# Patient Record
Sex: FEMALE | Race: WHITE | Employment: OTHER | URBAN - METROPOLITAN AREA
[De-identification: names, ages, dates, MRNs, and addresses within clinical notes are randomized per-mention and may not be internally consistent; named-entity substitution may affect disease eponyms.]

---

## 2017-01-01 ENCOUNTER — APPOINTMENT (OUTPATIENT)
Dept: GENERAL RADIOLOGY | Age: 72
End: 2017-01-01
Attending: EMERGENCY MEDICINE
Payer: MEDICARE

## 2017-01-01 ENCOUNTER — HOSPITAL ENCOUNTER (EMERGENCY)
Age: 72
Discharge: HOME | End: 2017-08-02
Attending: EMERGENCY MEDICINE
Payer: MEDICARE

## 2017-01-01 ENCOUNTER — HOSPITAL ENCOUNTER (EMERGENCY)
Age: 72
Discharge: HOME | End: 2017-05-10
Attending: EMERGENCY MEDICINE | Admitting: EMERGENCY MEDICINE
Payer: MEDICARE

## 2017-01-01 VITALS
HEIGHT: 60 IN | WEIGHT: 104.5 LBS | HEART RATE: 82 BPM | TEMPERATURE: 99 F | BODY MASS INDEX: 20.52 KG/M2 | OXYGEN SATURATION: 99 % | SYSTOLIC BLOOD PRESSURE: 158 MMHG | RESPIRATION RATE: 18 BRPM | DIASTOLIC BLOOD PRESSURE: 89 MMHG

## 2017-01-01 VITALS
RESPIRATION RATE: 22 BRPM | HEART RATE: 88 BPM | HEIGHT: 60 IN | TEMPERATURE: 97.7 F | BODY MASS INDEX: 20.62 KG/M2 | SYSTOLIC BLOOD PRESSURE: 143 MMHG | WEIGHT: 105 LBS | OXYGEN SATURATION: 100 % | DIASTOLIC BLOOD PRESSURE: 86 MMHG

## 2017-01-01 DIAGNOSIS — S20.212A RIB CONTUSION, LEFT, INITIAL ENCOUNTER: ICD-10-CM

## 2017-01-01 DIAGNOSIS — R53.1 GENERALIZED WEAKNESS: Primary | ICD-10-CM

## 2017-01-01 DIAGNOSIS — B37.0 THRUSH: ICD-10-CM

## 2017-01-01 DIAGNOSIS — J34.0 NASAL ABSCESS: Primary | ICD-10-CM

## 2017-01-01 LAB
ALBUMIN SERPL BCP-MCNC: 3.5 G/DL (ref 3.5–5)
ALBUMIN/GLOB SERPL: 1 {RATIO} (ref 1.1–2.2)
ALP SERPL-CCNC: 186 U/L (ref 45–117)
ALT SERPL-CCNC: 23 U/L (ref 12–78)
AMPHET UR QL SCN: NEGATIVE
ANION GAP BLD CALC-SCNC: 6 MMOL/L (ref 5–15)
APAP SERPL-MCNC: 3 UG/ML (ref 10–30)
APPEARANCE UR: CLEAR
APPEARANCE UR: CLEAR
AST SERPL W P-5'-P-CCNC: 20 U/L (ref 15–37)
ATRIAL RATE: 87 BPM
BACTERIA URNS QL MICRO: NEGATIVE /HPF
BACTERIA URNS QL MICRO: NEGATIVE /HPF
BARBITURATES UR QL SCN: NEGATIVE
BASOPHILS # BLD AUTO: 0 K/UL (ref 0–0.1)
BASOPHILS # BLD: 1 % (ref 0–1)
BENZODIAZ UR QL: NEGATIVE
BILIRUB SERPL-MCNC: 0.2 MG/DL (ref 0.2–1)
BILIRUB UR QL: NEGATIVE
BILIRUB UR QL: NEGATIVE
BUN SERPL-MCNC: 25 MG/DL (ref 6–20)
BUN/CREAT SERPL: 21 (ref 12–20)
CALCIUM SERPL-MCNC: 8.1 MG/DL (ref 8.5–10.1)
CALCULATED P AXIS, ECG09: 78 DEGREES
CALCULATED R AXIS, ECG10: 16 DEGREES
CALCULATED T AXIS, ECG11: 56 DEGREES
CANNABINOIDS UR QL SCN: NEGATIVE
CHLORIDE SERPL-SCNC: 111 MMOL/L (ref 97–108)
CK MB CFR SERPL CALC: 1.1 % (ref 0–2.5)
CK MB SERPL-MCNC: 1.1 NG/ML (ref 5–25)
CK SERPL-CCNC: 99 U/L (ref 26–192)
CO2 SERPL-SCNC: 23 MMOL/L (ref 21–32)
COCAINE UR QL SCN: NEGATIVE
COLOR UR: ABNORMAL
COLOR UR: NORMAL
CREAT SERPL-MCNC: 1.18 MG/DL (ref 0.55–1.02)
DIAGNOSIS, 93000: NORMAL
DRUG SCRN COMMENT,DRGCM: NORMAL
EOSINOPHIL # BLD: 0.3 K/UL (ref 0–0.4)
EOSINOPHIL NFR BLD: 4 % (ref 0–7)
EPITH CASTS URNS QL MICRO: ABNORMAL /LPF
EPITH CASTS URNS QL MICRO: NORMAL /LPF
ERYTHROCYTE [DISTWIDTH] IN BLOOD BY AUTOMATED COUNT: 16.1 % (ref 11.5–14.5)
ETHANOL SERPL-MCNC: <10 MG/DL
GLOBULIN SER CALC-MCNC: 3.4 G/DL (ref 2–4)
GLUCOSE SERPL-MCNC: 83 MG/DL (ref 65–100)
GLUCOSE UR STRIP.AUTO-MCNC: NEGATIVE MG/DL
GLUCOSE UR STRIP.AUTO-MCNC: NEGATIVE MG/DL
HCT VFR BLD AUTO: 30.2 % (ref 35–47)
HGB BLD-MCNC: 9 G/DL (ref 11.5–16)
HGB UR QL STRIP: ABNORMAL
HGB UR QL STRIP: NEGATIVE
HYALINE CASTS URNS QL MICRO: ABNORMAL /LPF (ref 0–5)
HYALINE CASTS URNS QL MICRO: NORMAL /LPF (ref 0–5)
KETONES UR QL STRIP.AUTO: NEGATIVE MG/DL
KETONES UR QL STRIP.AUTO: NEGATIVE MG/DL
LEUKOCYTE ESTERASE UR QL STRIP.AUTO: NEGATIVE
LEUKOCYTE ESTERASE UR QL STRIP.AUTO: NEGATIVE
LYMPHOCYTES # BLD AUTO: 24 % (ref 12–49)
LYMPHOCYTES # BLD: 2.1 K/UL (ref 0.8–3.5)
MCH RBC QN AUTO: 23.7 PG (ref 26–34)
MCHC RBC AUTO-ENTMCNC: 29.8 G/DL (ref 30–36.5)
MCV RBC AUTO: 79.7 FL (ref 80–99)
METHADONE UR QL: NEGATIVE
MONOCYTES # BLD: 0.4 K/UL (ref 0–1)
MONOCYTES NFR BLD AUTO: 4 % (ref 5–13)
NEUTS SEG # BLD: 5.9 K/UL (ref 1.8–8)
NEUTS SEG NFR BLD AUTO: 67 % (ref 32–75)
NITRITE UR QL STRIP.AUTO: NEGATIVE
NITRITE UR QL STRIP.AUTO: NEGATIVE
OPIATES UR QL: NEGATIVE
P-R INTERVAL, ECG05: 144 MS
PCP UR QL: NEGATIVE
PH UR STRIP: 5.5 [PH] (ref 5–8)
PH UR STRIP: 5.5 [PH] (ref 5–8)
PLATELET # BLD AUTO: 349 K/UL (ref 150–400)
POTASSIUM SERPL-SCNC: 4.2 MMOL/L (ref 3.5–5.1)
PROT SERPL-MCNC: 6.9 G/DL (ref 6.4–8.2)
PROT UR STRIP-MCNC: ABNORMAL MG/DL
PROT UR STRIP-MCNC: NEGATIVE MG/DL
Q-T INTERVAL, ECG07: 400 MS
QRS DURATION, ECG06: 70 MS
QTC CALCULATION (BEZET), ECG08: 481 MS
RBC # BLD AUTO: 3.79 M/UL (ref 3.8–5.2)
RBC #/AREA URNS HPF: ABNORMAL /HPF (ref 0–5)
RBC #/AREA URNS HPF: NORMAL /HPF (ref 0–5)
SALICYLATES SERPL-MCNC: 4.6 MG/DL (ref 2.8–20)
SODIUM SERPL-SCNC: 140 MMOL/L (ref 136–145)
SP GR UR REFRACTOMETRY: 1.01 (ref 1–1.03)
SP GR UR REFRACTOMETRY: 1.01 (ref 1–1.03)
TROPONIN I SERPL-MCNC: <0.04 NG/ML
UA: UC IF INDICATED,UAUC: ABNORMAL
UA: UC IF INDICATED,UAUC: NORMAL
UROBILINOGEN UR QL STRIP.AUTO: 0.2 EU/DL (ref 0.2–1)
UROBILINOGEN UR QL STRIP.AUTO: 0.2 EU/DL (ref 0.2–1)
VENTRICULAR RATE, ECG03: 87 BPM
WBC # BLD AUTO: 8.8 K/UL (ref 3.6–11)
WBC URNS QL MICRO: ABNORMAL /HPF (ref 0–4)
WBC URNS QL MICRO: NORMAL /HPF (ref 0–4)

## 2017-01-01 PROCEDURE — 99285 EMERGENCY DEPT VISIT HI MDM: CPT

## 2017-01-01 PROCEDURE — 74011250637 HC RX REV CODE- 250/637: Performed by: EMERGENCY MEDICINE

## 2017-01-01 PROCEDURE — 80053 COMPREHEN METABOLIC PANEL: CPT | Performed by: EMERGENCY MEDICINE

## 2017-01-01 PROCEDURE — 36415 COLL VENOUS BLD VENIPUNCTURE: CPT | Performed by: EMERGENCY MEDICINE

## 2017-01-01 PROCEDURE — 81001 URINALYSIS AUTO W/SCOPE: CPT | Performed by: EMERGENCY MEDICINE

## 2017-01-01 PROCEDURE — 74011000250 HC RX REV CODE- 250: Performed by: EMERGENCY MEDICINE

## 2017-01-01 PROCEDURE — 80307 DRUG TEST PRSMV CHEM ANLYZR: CPT | Performed by: EMERGENCY MEDICINE

## 2017-01-01 PROCEDURE — 84484 ASSAY OF TROPONIN QUANT: CPT | Performed by: EMERGENCY MEDICINE

## 2017-01-01 PROCEDURE — 99284 EMERGENCY DEPT VISIT MOD MDM: CPT

## 2017-01-01 PROCEDURE — 75810000289 HC I&D ABSCESS SIMP/COMP/MULT

## 2017-01-01 PROCEDURE — 85025 COMPLETE CBC W/AUTO DIFF WBC: CPT | Performed by: EMERGENCY MEDICINE

## 2017-01-01 PROCEDURE — 82550 ASSAY OF CK (CPK): CPT | Performed by: EMERGENCY MEDICINE

## 2017-01-01 PROCEDURE — 71010 XR CHEST PORT: CPT

## 2017-01-01 PROCEDURE — 71020 XR CHEST PA LAT: CPT

## 2017-01-01 PROCEDURE — 93005 ELECTROCARDIOGRAM TRACING: CPT

## 2017-01-01 RX ORDER — CEPHALEXIN 250 MG/1
500 CAPSULE ORAL
Status: COMPLETED | OUTPATIENT
Start: 2017-01-01 | End: 2017-01-01

## 2017-01-01 RX ORDER — MUPIROCIN 20 MG/G
OINTMENT TOPICAL 3 TIMES DAILY
Qty: 22 G | Refills: 0 | Status: SHIPPED | OUTPATIENT
Start: 2017-01-01 | End: 2017-01-01

## 2017-01-01 RX ORDER — CEPHALEXIN 500 MG/1
500 CAPSULE ORAL 3 TIMES DAILY
Qty: 21 CAP | Refills: 0 | Status: SHIPPED | OUTPATIENT
Start: 2017-01-01 | End: 2017-01-01

## 2017-01-01 RX ORDER — HYDROCODONE BITARTRATE AND ACETAMINOPHEN 5; 325 MG/1; MG/1
1 TABLET ORAL
Qty: 20 TAB | Refills: 0 | Status: SHIPPED | OUTPATIENT
Start: 2017-01-01 | End: 2018-01-01

## 2017-01-01 RX ORDER — SULFAMETHOXAZOLE AND TRIMETHOPRIM 800; 160 MG/1; MG/1
1 TABLET ORAL 2 TIMES DAILY
Qty: 20 TAB | Refills: 0 | Status: SHIPPED | OUTPATIENT
Start: 2017-01-01 | End: 2017-01-01

## 2017-01-01 RX ORDER — NYSTATIN 100000 [USP'U]/ML
1 SUSPENSION ORAL 4 TIMES DAILY
Qty: 473 ML | Refills: 0 | Status: SHIPPED | OUTPATIENT
Start: 2017-01-01 | End: 2018-01-01

## 2017-01-01 RX ORDER — LIDOCAINE HYDROCHLORIDE 20 MG/ML
15 SOLUTION OROPHARYNGEAL
Status: COMPLETED | OUTPATIENT
Start: 2017-01-01 | End: 2017-01-01

## 2017-01-01 RX ORDER — SULFAMETHOXAZOLE AND TRIMETHOPRIM 800; 160 MG/1; MG/1
1 TABLET ORAL
Status: COMPLETED | OUTPATIENT
Start: 2017-01-01 | End: 2017-01-01

## 2017-01-01 RX ORDER — HYDROCODONE BITARTRATE AND ACETAMINOPHEN 5; 325 MG/1; MG/1
1 TABLET ORAL
Status: COMPLETED | OUTPATIENT
Start: 2017-01-01 | End: 2017-01-01

## 2017-01-01 RX ADMIN — HYDROCODONE BITARTRATE AND ACETAMINOPHEN 1 TABLET: 5; 325 TABLET ORAL at 16:35

## 2017-01-01 RX ADMIN — SULFAMETHOXAZOLE AND TRIMETHOPRIM 1 TABLET: 800; 160 TABLET ORAL at 15:11

## 2017-01-01 RX ADMIN — CEPHALEXIN 500 MG: 250 CAPSULE ORAL at 15:11

## 2017-01-01 RX ADMIN — LIDOCAINE HYDROCHLORIDE 15 ML: 20 SOLUTION ORAL; TOPICAL at 15:11

## 2017-02-16 NOTE — TELEPHONE ENCOUNTER
Call PCP for refills, not seen in 1.5 years    Received refill request from [address] for NTG which we do show in med list.    Spoke with pharmacist.  Christy Downs to call PCP for all refills as we have not seen in over a year, was due back Jan 2016. Pharmacist verbalized understanding.

## 2017-05-10 NOTE — ED NOTES
The physician has reviewed discharge instructions with the patient. The patient verbalized understanding. Patient ambulated out of the Emergency Department.

## 2017-05-10 NOTE — DISCHARGE INSTRUCTIONS
Candidiasis: Care Instructions  Your Care Instructions  Candidiasis (say \"xlm-sdo-HT-uh-tatyana\") is a yeast infection. Yeast normally lives in your body. But it can cause problems if your body's defenses don't work as they should. Some medicines can increase your chance of getting a yeast infection. These include antibiotics, steroids, and cancer drugs. And some diseases like AIDS and diabetes can make you more likely to get yeast infections. There are different types of yeast infections. [place] is a yeast infection in the mouth. It usually occurs in people with weak immune systems. It causes white patches inside the mouth and throat. Yeast infections of the skin usually occur in skin folds where the skin stays moist. They cause red, oozing patches on your skin. Babies can get these infections under the diaper. People who often wear gloves can get them on their hands. Many women get vaginal yeast infections. They are most common when women take antibiotics. These infections can cause the vagina to itch and burn. They also cause white discharge that looks like cottage cheese. In rare cases, yeast infects the blood. This can cause serious disease. This kind of infection is treated with medicine given through a needle into a vein (IV). After you start treatment, a yeast infection usually goes away quickly. But if your immune system is weak, the infection may come back. Tell your doctor if you get yeast infections often. Follow-up care is a key part of your treatment and safety. Be sure to make and go to all appointments, and call your doctor if you are having problems. It's also a good idea to know your test results and keep a list of the medicines you take. How can you care for yourself at home? · Take your medicines exactly as prescribed. Call your doctor if you think you are having a problem with your medicine. · Use antibiotics only as directed by your doctor. · Eat yogurt with live cultures.  It has bacteria called lactobacillus. It may help prevent some types of yeast infections. · Keep your skin clean and dry. Put powder on moist places. · If you are using a cream or suppository to treat a vaginal yeast infection, don't use condoms or a diaphragm. Use a different type of birth control. · Eat a healthy diet and get regular exercise. This will help keep your immune system strong. When should you call for help? Call your doctor now or seek immediate medical care if:  · You have a fever. · You are pregnant and have signs of a vaginal or urinary tract infection such as:  ¨ Severe itching in your vagina. ¨ Pain during sex or when you urinate. ¨ Unusual discharge from your vagina. ¨ A frequent urge to urinate. ¨ Urine that is cloudy or smells bad. Watch closely for changes in your health, and be sure to contact your doctor if:  · You do not get better as expected. Where can you learn more? Go to http://valerie-gene.info/. Enter B298 in the search box to learn more about \"Candidiasis: Care Instructions. \"  Current as of: October 13, 2016  Content Version: 11.2  © 9524-5214 [employer]. Care instructions adapted under license by [employer] (which disclaims liability or warranty for this information). If you have questions about a medical condition or this instruction, always ask your healthcare professional. [address] any warranty or liability for your use of this information.

## 2017-05-10 NOTE — ED PROVIDER NOTES
HPI Comments: Amrit Meyer is a 66 y.o. female with PMHx significant for COPD, CAD, HTN, Hyperlipemia. Hyperkalemia, DM, and an MI, who presents ambulatory to [facility] ED with cc of lesions in her right upper mouth and a nose abscess x 4 days. Pt states she also has a productive cough with clear sputum, and claims she hasn't been able to sleep for 2 days. She notes that she had pain to \"the left side of her ribs\" but does not experience it now. Pt notes she had taken a fall off of her sofa while trying to put on curtains 8 days ago. She also notes that she had seen an ENT specialist, because she cannot hear out of her right ear. She also notes that she has an albuterol inhalar for her COPD, and took Prednisone which she claims helped with her coughing. She also states to have inserted a needle into her mouth to \"drain her abscess\" but was unsuccessful. Pt denies denies any fever, nausea, or vomiting. Josselyn Peña MD  Cardiologist: Holly Whatley MD    Social Hx: + smoking; - EtOH; - illicit drug use    There are no other complains, changes, or physical findings at this time. The history is provided by the patient. No  was used. Past Medical History:   Diagnosis Date    [facility] [address]     CAD (coronary artery disease)     COPD     Diabetes ([facility] [address]     Emphysema     Essential hypertension     Gastrointestinal disorder     ulcer    Hyperkalemia     Hyperlipemia     Hypertension        Past Surgical History:   Procedure Laterality Date    CORONARY STENT SINGLE W/PTCA      HX CORONARY STENT PLACEMENT      HX HEART CATHETERIZATION           No family history on file. Social History     Social History    Marital status: SINGLE     Spouse name: N/A    Number of children: N/A    Years of education: N/A     Occupational History    Not on file.      Social History Main Topics    Smoking status: Current Every Day Smoker     Packs/day: 0.50     Years: 0.00 Types: Cigarettes    Smokeless tobacco: Never Used    Alcohol use No    Drug use: No    Sexual activity: Not on file     Other Topics Concern    Not on file     Social History Narrative         ALLERGIES: Iodinated contrast media - oral and iv dye; Penicillin g; Ciprofloxacin; and Lyrica [pregabalin]    Review of Systems   Constitutional: Negative for fever. Respiratory: Positive for cough (productive). Gastrointestinal: Negative for nausea and vomiting. Genitourinary: Positive for flank pain (left). Patient Vitals for the past 12 hrs:   Temp Pulse Resp BP SpO2   05/10/17 1639 99 °F (37.2 °C) 82 18 - 99 %   05/10/17 1600 - - - 158/89 -   05/10/17 1544 - - - - 100 %   05/10/17 1513 - - - 149/81 -   05/10/17 1235 98 °F (36.7 °C) (!) 106 16 113/66 100 %       Physical Exam   Constitutional: She is oriented to person, place, and time. She appears well-developed and well-nourished. No distress. HENT:   Head: Normocephalic and atraumatic. Mouth/Throat: Oropharynx is clear and moist. No oropharyngeal exudate. Swelling to the right upper gum line, there appears to be material along tongue and mucosa c/w thrush, there is a lesion in the right nare at opening on medial border with small pustule, no significant erythema extending to the cheek or upper lip, edentulous   Eyes: Conjunctivae and EOM are normal. Pupils are equal, round, and reactive to light. Neck: Normal range of motion. Neck supple. No JVD present. No tracheal deviation present. Cardiovascular: Normal rate, regular rhythm, normal heart sounds and intact distal pulses. No murmur heard. Pulmonary/Chest: Effort normal and breath sounds normal. No stridor. No respiratory distress. She has no wheezes. She has no rales. She exhibits no tenderness. Coarse rhonchi   Abdominal: Soft. She exhibits no distension. There is no tenderness. There is no rebound and no guarding. Musculoskeletal: Normal range of motion.  She exhibits no edema or tenderness. Neurological: She is alert and oriented to person, place, and time. No cranial nerve deficit. No gross motor or sensory deficits    Skin: Skin is warm and dry. She is not diaphoretic. Psychiatric: She has a normal mood and affect. Her behavior is normal.   Nursing note and vitals reviewed. MDM  Number of Diagnoses or Management Options  Diagnosis management comments: DDx: nasal abscess, rib fracture, bronchitis, PNA, aphthous ulcer, thrush       Amount and/or Complexity of Data Reviewed  Tests in the radiology section of CPT®: ordered and reviewed  Review and summarize past medical records: yes    Patient Progress  Patient progress: stable      Procedures  Procedure Note - Incision and Drainage:   4:10 PM  Performed by: Dylon Beckman MD  Skin not prepped with anesthetic. Sterile field established. Abscess to mouth was incised with 18 guage needle, and 4 CCs of pus drainage was expressed. Wound probed and irrigated. Sterile dressing applied. Estimated blood loss: little  The procedure took 1-15 minutes, and pt tolerated well, expressed gums feel better.   Written by Orlin Salinas ED Scribe, as dictated by Dylon Beckman MD.     LABORATORY TESTS:  Recent Results (from the past 12 hour(s))   URINALYSIS W/ REFLEX CULTURE    Collection Time: 05/10/17  1:14 PM   Result Value Ref Range    Color YELLOW/STRAW      Appearance CLEAR CLEAR      Specific gravity 1.010 1.003 - 1.030      pH (UA) 5.5 5.0 - 8.0      Protein TRACE (A) NEG mg/dL    Glucose NEGATIVE  NEG mg/dL    Ketone NEGATIVE  NEG mg/dL    Bilirubin NEGATIVE  NEG      Blood TRACE (A) NEG      Urobilinogen 0.2 0.2 - 1.0 EU/dL    Nitrites NEGATIVE  NEG      Leukocyte Esterase NEGATIVE  NEG      WBC 0-4 0 - 4 /hpf    RBC 0-5 0 - 5 /hpf    Epithelial cells FEW FEW /lpf    Bacteria NEGATIVE  NEG /hpf    UA:UC IF INDICATED CULTURE NOT INDICATED BY UA RESULT CNI      Hyaline cast 0-2 0 - 5 /lpf       IMAGING RESULTS:    CXR Results  (Last 48 hours)               05/10/17 1400  XR CHEST PA LAT Final result    Impression:  IMPRESSION:   1. Emphysema   2. No rib fractures       Narrative:  Exam:  2 view chest       Indication: Fall x2 weeks injury left chest.       COMPARISON: 4/12/2014       PA and lateral views demonstrate normal heart size. Coronary stents are noted. The lungs are hyperinflated and there are findings consistent with emphysema. There is no acute process. Visualized osseous structures reveal no rib   fractures. There is dextroconvex curvature of the lower thoracic and upper   lumbar spine. There is mild wedging of the T12 vertebral body which is unchanged. MEDICATIONS GIVEN:  Medications   lidocaine (XYLOCAINE) 2 % viscous solution 15 mL (15 mL Mouth/Throat Given 5/10/17 1511)   trimethoprim-sulfamethoxazole (BACTRIM DS, SEPTRA DS) 160-800 mg per tablet 1 Tab (1 Tab Oral Given 5/10/17 1511)   cephALEXin (KEFLEX) capsule 500 mg (500 mg Oral Given 5/10/17 1511)   HYDROcodone-acetaminophen (NORCO) 5-325 mg per tablet 1 Tab (1 Tab Oral Given 5/10/17 1635)       IMPRESSION:  1. Nasal abscess    2. Thrush    3. Rib contusion, left, initial encounter        PLAN:  1. Discharge Medication List as of 5/10/2017  4:29 PM      START taking these medications    Details   trimethoprim-sulfamethoxazole (BACTRIM DS) 160-800 mg per tablet Take 1 Tab by mouth two (2) times a day for 10 days. , Normal, Disp-20 Tab, R-0      cephALEXin (KEFLEX) 500 mg capsule Take 1 Cap by mouth three (3) times daily for 7 days. , Normal, Disp-21 Cap, R-0      mupirocin (BACTROBAN) 2 % ointment Apply  to affected area three (3) times daily for 10 days. Apply to affected area, Normal, Disp-22 g, R-0      HYDROcodone-acetaminophen (NORCO) 5-325 mg per tablet Take 1 Tab by mouth every four (4) hours as needed for Pain.  Max Daily Amount: 6 Tabs., Print, Disp-20 Tab, R-0      nystatin (MYCOSTATIN) 100,000 unit/mL suspension Take 5 mL by mouth four (4) times daily. swish and spit  Indications: ORAL CANDIDIASIS, Normal, Disp-473 mL, R-0         CONTINUE these medications which have NOT CHANGED    Details   albuterol (PROVENTIL HFA, VENTOLIN HFA, PROAIR HFA) 90 mcg/actuation inhaler Take  by inhalation. , Historical Med      pravastatin (PRAVACHOL) 80 mg tablet TAKE 1 TABLET BY MOUTH ONCE DAILY, Normal, Disp-90 Tab, R-0      ASPIRIN/ACETAMINOPHEN/CAFFEINE (EXCEDRIN EXTRA STRENGTH PO) Take 1 Tab by mouth as needed., Historical Med      metoprolol succinate (TOPROL-XL) 25 mg XL tablet Take 0.5 Tabs by mouth two (2) times a day., Normal, Disp-60 Tab, R-5      clonazepam (KLONOPIN) 0.5 mg tablet Take 0.5 mg by mouth as needed., Historical Med           2. Follow-up Information     Follow up With Details 42[address] Tim VARGAS MD   [address] [place] [address]  [address] [address] [phone]      [facility] EMERGENCY DEPT  If symptoms worsen [address] [place] [address] Drive  [address] [place] [address]  [phone]        Return to ED if worse     DISCHARGE NOTE  4:29 PM  The patient has been re-evaluated and is ready for discharge. Reviewed available results with patient. Counseled patient on diagnosis and care plan. Patient has expressed understanding, and all questions have been answered. Patient agrees with plan and agrees to follow up as recommended, or return to the ED if their symptoms worsen. Discharge instructions have been provided and explained to the patient, along with reasons to return to the ED. ATTESTATION:  This note is prepared by Suri Carney, acting as Scribe for Deonte Sosa MD.    Deonte Sosa MD: The scribe's documentation has been prepared under my direction and personally reviewed by me in its entirety. I confirm that the note above accurately reflects all work, treatment, procedures, and medical decision making performed by me.

## 2017-08-02 NOTE — DISCHARGE INSTRUCTIONS
Fatigue: Care Instructions  Your Care Instructions  Fatigue is a feeling of tiredness, exhaustion, or lack of energy. You may feel fatigue because of too much or not enough activity. It can also come from stress, lack of sleep, boredom, and poor diet. Many medical problems, such as viral infections, can cause fatigue. Emotional problems, especially depression, are often the cause of fatigue. Fatigue is most often a symptom of another problem. Treatment for fatigue depends on the cause. For example, if you have fatigue because you have a certain health problem, treating this problem also treats your fatigue. If depression or anxiety is the cause, treatment may help. Follow-up care is a key part of your treatment and safety. Be sure to make and go to all appointments, and call your doctor if you are having problems. It's also a good idea to know your test results and keep a list of the medicines you take. How can you care for yourself at home? · Get regular exercise. But don't overdo it. Go back and forth between rest and exercise. · Get plenty of rest.  · Eat a healthy diet. Do not skip meals, especially breakfast.  · Reduce your use of caffeine, tobacco, and alcohol. Caffeine is most often found in coffee, tea, cola drinks, and chocolate. · Limit medicines that can cause fatigue. This includes tranquilizers and cold and allergy medicines. When should you call for help? Watch closely for changes in your health, and be sure to contact your doctor if:  · You have new symptoms such as fever or a rash. · Your fatigue gets worse. · You have been feeling down, depressed, or hopeless. Or you may have lost interest in things that you usually enjoy. · You are not getting better as expected. Where can you learn more? Go to http://valerie-gene.info/. Enter B077 in the search box to learn more about \"Fatigue: Care Instructions. \"  Current as of: March 20, 2017  Content Version: 11.3  © 6446-8413 [employer]. Care instructions adapted under license by [employer] (which disclaims liability or warranty for this information). If you have questions about a medical condition or this instruction, always ask your healthcare professional. [place] [address] any warranty or liability for your use of this information.

## 2017-08-02 NOTE — PROGRESS NOTES
Care management consult requested re: transportation home. The patient is a 66year old female who presented to ED due to [address]. CM met with patient in waiting room after discharge; she is alert and oriented and is ambulatory. She has been unable to reach any friends or family members to help with transportation. Per chart review she does have Medicaid; contacted Rich Pathak, who referred me to [facility] ([phone]). [facility] does not have the patient on file. Provided patient with a cab voucher to [facility]. Care Management Interventions  PCP Verified by CM: Yes  Mode of Transport at Discharge: Other (see comment) (cab voucher)  Transition of Care Consult (CM Consult): Discharge Planning  Current Support Network:  Other  Confirm Follow Up Transport: Self  Plan discussed with Pt/Family/Caregiver: Yes  Discharge Location  Discharge Placement: Home with outpatient services    KAMALJIT Hernandez  [phone]

## 2017-08-02 NOTE — ED PROVIDER NOTES
HPI Comments: Daniele Early, 66 y.o. female, with PMHx of CAD, MI, HTN, HLD, DM, COPD, emphysema presents via EMS to [facility] ED for evaluation of altered mental status. Per nursing staff, patient's roommate called EMS stating that the patient had fallen repeatedly today and was more sleepy and groggy compared to usual. EMS states that the roommate informed them that the patient takes clonazepam, however EMS could not find the pill bottle, but do note that they found an empty bottle of Nyquil. Patient states that she currently feels fatigued, generalized weakness, and vomited twice yesterday. Pt states that the fatigue and weakness have been present for 5 days. Pt is aware that she is at [facility] currently. She is aware of the current year. She denies blood in stool, black stool, CP, SOB, diarrhea, dysuria, numbness, abdominal pain. PCP: Fe Cuba MD    Social history significant for: + Tobacco, - EtOH, - Illicit Drug Use    There are no other complaints, changes, or physical findings at this time. The history is provided by the patient. No  was used. Past Medical History:   Diagnosis Date    Acute MI ([address]     CAD (coronary artery disease)     COPD     Diabetes ([facility]     Emphysema     Essential hypertension     Gastrointestinal disorder     ulcer    Hyperkalemia     Hyperlipemia     Hypertension        Past Surgical History:   Procedure Laterality Date    CORONARY STENT SINGLE W/PTCA      HX CORONARY STENT PLACEMENT      HX HEART CATHETERIZATION           History reviewed. No pertinent family history. Social History     Social History    Marital status: SINGLE     Spouse name: N/A    Number of children: N/A    Years of education: N/A     Occupational History    Not on file.      Social History Main Topics    Smoking status: Current Every Day Smoker     Packs/day: 0.50     Years: 0.00     Types: Cigarettes    Smokeless tobacco: Never Used    Alcohol use No    Drug use: No    Sexual activity: Not on file     Other Topics Concern    Not on file     Social History Narrative         ALLERGIES: Iodinated contrast- oral and iv dye; Penicillin g; Ciprofloxacin; and Lyrica [pregabalin]    Review of Systems   Constitutional: Positive for fatigue. Negative for fever. HENT: Negative. Eyes: Negative. Respiratory: Negative for shortness of breath and wheezing. Cardiovascular: Negative for chest pain and leg swelling. Gastrointestinal: Positive for vomiting. Negative for abdominal pain, blood in stool, constipation, diarrhea and nausea. Endocrine: Negative. Genitourinary: Negative for difficulty urinating and dysuria. Musculoskeletal: Negative. Skin: Negative for rash. Allergic/Immunologic: Negative. Neurological: Positive for weakness. Negative for numbness. Hematological: Negative. Psychiatric/Behavioral: Negative. Patient Vitals for the past 12 hrs:   Temp Pulse Resp BP SpO2   08/02/17 1400 - 87 18 124/70 100 %   08/02/17 1330 - 86 17 123/68 100 %   08/02/17 1318 - 85 18 131/75 100 %   08/02/17 1239 97.7 °F (36.5 °C) 94 16 143/84 100 %   08/02/17 1230 - 90 19 126/69 100 %   08/02/17 1205 - - - 143/84 -     Physical Exam   Constitutional: She is oriented to person, place, and time. She appears well-developed and well-nourished. HENT:   Head: Normocephalic and atraumatic. Mouth/Throat: Mucous membranes are normal.   Eyes: EOM are normal. Pupils are equal, round, and reactive to light. Neck: Normal range of motion. No JVD present. No tracheal deviation present. Cardiovascular: Normal rate, regular rhythm, normal heart sounds and intact distal pulses. Exam reveals no gallop and no friction rub. No murmur heard. Pulmonary/Chest: Effort normal and breath sounds normal. No stridor. She has no wheezes. She has no rales. Abdominal: Soft. Bowel sounds are normal. She exhibits no distension and no mass.  There is no tenderness. There is no guarding. Musculoskeletal: Normal range of motion. She exhibits no edema or tenderness. Neurological: She is alert and oriented to person, place, and time. Follows commands  Answers questions  Moves all extremities spontaneously   Skin: Skin is warm and dry. No rash noted. Several ecchymotic lesions over L forearm, no swelling or deformity. Pt with full ROM of her L wrist and elbow. Psychiatric: She has a normal mood and affect. Her behavior is normal. Judgment and thought content normal.        MDM  Number of Diagnoses or Management Options  Diagnosis management comments: Pt presenting for possible altered mental status and increased lethargy. Pt is alert and oriented on exam, is answering questions appropriately and following commands. Will check labs to eval for infection, dehydration, electrolyte abnormality, pneumonia, uti, drug overdose, acs. Amount and/or Complexity of Data Reviewed  Clinical lab tests: ordered and reviewed  Tests in the radiology section of CPT®: ordered and reviewed  Tests in the medicine section of CPT®: ordered and reviewed  Review and summarize past medical records: yes  Independent visualization of images, tracings, or specimens: yes    Patient Progress  Patient progress: stable    ED Course       Procedures    EKG interpretation: (Preliminary) 1:27 PM  Rhythm: normal sinus rhythm; and regular . Rate (approx.): 87; Axis: normal; RI interval: normal; QRS interval: normal ; ST/T wave: normal.    Progress Note:  3:26 PM  Patient ambulated. She is tolerating PO and denies any complaints.      LABORATORY TESTS:  Recent Results (from the past 12 hour(s))   EKG, 12 LEAD, INITIAL    Collection Time: 08/02/17  1:27 PM   Result Value Ref Range    Ventricular Rate 87 BPM    Atrial Rate 87 BPM    P-R Interval 144 ms    QRS Duration 70 ms    Q-T Interval 400 ms    QTC Calculation (Bezet) 481 ms    Calculated P Axis 78 degrees    Calculated R Axis 16 degrees Calculated T Axis 56 degrees    Diagnosis       Normal sinus rhythm  Low voltage QRS  Septal infarct , age undetermined  Abnormal ECG  When compared with ECG of 17-JUN-2014 12:21,  Septal infarct is now present     METABOLIC PANEL, COMPREHENSIVE    Collection Time: 08/02/17  1:31 PM   Result Value Ref Range    Sodium 140 136 - 145 mmol/L    Potassium 4.2 3.5 - 5.1 mmol/L    Chloride 111 (H) 97 - 108 mmol/L    CO2 23 21 - 32 mmol/L    Anion gap 6 5 - 15 mmol/L    Glucose 83 65 - 100 mg/dL    BUN 25 (H) 6 - 20 MG/DL    Creatinine 1.18 (H) 0.55 - 1.02 MG/DL    BUN/Creatinine ratio 21 (H) 12 - 20      GFR est AA 54 (L) >60 ml/min/1.73m2    GFR est non-AA 45 (L) >60 ml/min/1.73m2    Calcium 8.1 (L) 8.5 - 10.1 MG/DL    Bilirubin, total 0.2 0.2 - 1.0 MG/DL    ALT (SGPT) 23 12 - 78 U/L    AST (SGOT) 20 15 - 37 U/L    Alk. phosphatase 186 (H) 45 - 117 U/L    Protein, total 6.9 6.4 - 8.2 g/dL    Albumin 3.5 3.5 - 5.0 g/dL    Globulin 3.4 2.0 - 4.0 g/dL    A-G Ratio 1.0 (L) 1.1 - 2.2     CK W/ CKMB & INDEX    Collection Time: 08/02/17  1:31 PM   Result Value Ref Range    CK 99 26 - 192 U/L    CK - MB 1.1 <3.6 NG/ML    CK-MB Index 1.1 0 - 2.5     CBC WITH AUTOMATED DIFF    Collection Time: 08/02/17  1:31 PM   Result Value Ref Range    WBC 8.8 3.6 - 11.0 K/uL    RBC 3.79 (L) 3.80 - 5.20 M/uL    HGB 9.0 (L) 11.5 - 16.0 g/dL    HCT 30.2 (L) 35.0 - 47.0 %    MCV 79.7 (L) 80.0 - 99.0 FL    MCH 23.7 (L) 26.0 - 34.0 PG    MCHC 29.8 (L) 30.0 - 36.5 g/dL    RDW 16.1 (H) 11.5 - 14.5 %    PLATELET 587 211 - 041 K/uL    NEUTROPHILS 67 32 - 75 %    LYMPHOCYTES 24 12 - 49 %    MONOCYTES 4 (L) 5 - 13 %    EOSINOPHILS 4 0 - 7 %    BASOPHILS 1 0 - 1 %    ABS. NEUTROPHILS 5.9 1.8 - 8.0 K/UL    ABS. LYMPHOCYTES 2.1 0.8 - 3.5 K/UL    ABS. MONOCYTES 0.4 0.0 - 1.0 K/UL    ABS. EOSINOPHILS 0.3 0.0 - 0.4 K/UL    ABS.  BASOPHILS 0.0 0.0 - 0.1 K/UL   TROPONIN I    Collection Time: 08/02/17  1:31 PM   Result Value Ref Range    Troponin-I, Qt. <0.04 <0.05 ng/mL   DRUG SCREEN, URINE    Collection Time: 08/02/17  1:31 PM   Result Value Ref Range    AMPHETAMINES NEGATIVE  NEG      BARBITURATES NEGATIVE  NEG      BENZODIAZEPINE NEGATIVE  NEG      COCAINE NEGATIVE  NEG      METHADONE NEGATIVE  NEG      OPIATES NEGATIVE  NEG      PCP(PHENCYCLIDINE) NEGATIVE  NEG      THC (TH-CANNABINOL) NEGATIVE  NEG      Drug screen comment (NOTE)    ACETAMINOPHEN    Collection Time: 08/02/17  1:31 PM   Result Value Ref Range    Acetaminophen level 3 (L) 10 - 30 ug/mL   SALICYLATE    Collection Time: 08/02/17  1:31 PM   Result Value Ref Range    SALICYLATE 4.6 2.8 - 29.5 MG/DL   ETHYL ALCOHOL    Collection Time: 08/02/17  1:31 PM   Result Value Ref Range    ALCOHOL(ETHYL),SERUM <10 <10 MG/DL   URINALYSIS W/ REFLEX CULTURE    Collection Time: 08/02/17  1:31 PM   Result Value Ref Range    Color YELLOW/STRAW      Appearance CLEAR CLEAR      Specific gravity 1.010 1.003 - 1.030      pH (UA) 5.5 5.0 - 8.0      Protein NEGATIVE  NEG mg/dL    Glucose NEGATIVE  NEG mg/dL    Ketone NEGATIVE  NEG mg/dL    Bilirubin NEGATIVE  NEG      Blood NEGATIVE  NEG      Urobilinogen 0.2 0.2 - 1.0 EU/dL    Nitrites NEGATIVE  NEG      Leukocyte Esterase NEGATIVE  NEG      WBC 0-4 0 - 4 /hpf    RBC 0-5 0 - 5 /hpf    Epithelial cells FEW FEW /lpf    Bacteria NEGATIVE  NEG /hpf    UA:UC IF INDICATED CULTURE NOT INDICATED BY UA RESULT CNI      Hyaline cast 0-2 0 - 5 /lpf       IMAGING RESULTS:  XR CHEST PORT   Final Result   Clinical history: Fall, altered mental status  INDICATION: Fall, altered mental status     COMPARISON: 5/10/2017     FINDINGS:   AP semiupright view of the chest is obtained . The cardiopericardial silhouette  is stable. There is no pleural effusion, pneumothorax or focal consolidation  present. The patient is rotated. The patient is on a cardiac monitor.     IMPRESSION:     No acute thoracic disease.          IMPRESSION:  1. Generalized weakness        PLAN:  1.  Discharge     Follow-up Information     Follow up With Details Comments Contact Info    Jemma Galvan MD Schedule an appointment as soon as possible for a visit  [address] [place] [address]  [address] [address] [phone]      [facility] EMERGENCY DEPT  As needed, If symptoms worsen [address]  [address] [facility]  [phone]        Return to ED if worse     Discharge Note:  3:33 PM  The pt is ready for discharge. The pt's signs, symptoms, diagnosis, and discharge instructions have been discussed and pt has conveyed their understanding. The pt is to follow up as recommended or return to ER should their symptoms worsen. Plan has been discussed and pt is in agreement. This note is prepared by Daysi Jain, acting as a Scribe for Lino Herrera DO. Lino Herrera DO: The scribe's documentation has been prepared under my direction and personally reviewed by me in its entirety. I confirm that the notes above accurately reflects all work, treatment, procedures, and medical decision making performed by me.

## 2017-08-02 NOTE — ED NOTES
Patient arrived VIA EMS after being called by patients roommate for altered mental status. patients roommate states patients mental status is different today, patient is sleepy, groggy, and has had 2 falls. patient fell twice this morning according to roommate, patient denies any complaints. On assessment of patient, she is sleeping, will wake with verbal stimuli, answer questions appropriately, then fall right back to sleep. Roommate states patient takes clonazepam, and they cannot find the pill bottle, also an empty bottle of Nyquil was found by EMS this morning. Patient states she tripped over her feet and fell, patient states she fell on her left side striking her left arm and head on the hardwood floor. Patient denies LOC, patient has small abrasions and bruising to the lower left arm .

## 2018-01-01 ENCOUNTER — APPOINTMENT (OUTPATIENT)
Dept: GENERAL RADIOLOGY | Age: 73
DRG: 190 | End: 2018-01-01
Attending: NURSE PRACTITIONER
Payer: MEDICARE

## 2018-01-01 ENCOUNTER — HOME CARE VISIT (OUTPATIENT)
Dept: HOSPICE | Facility: HOSPICE | Age: 73
End: 2018-01-01
Payer: MEDICARE

## 2018-01-01 ENCOUNTER — APPOINTMENT (OUTPATIENT)
Dept: NUCLEAR MEDICINE | Age: 73
DRG: 190 | End: 2018-01-01
Attending: INTERNAL MEDICINE
Payer: MEDICARE

## 2018-01-01 ENCOUNTER — APPOINTMENT (OUTPATIENT)
Dept: GENERAL RADIOLOGY | Age: 73
DRG: 190 | End: 2018-01-01
Attending: INTERNAL MEDICINE
Payer: MEDICARE

## 2018-01-01 ENCOUNTER — HOSPITAL ENCOUNTER (INPATIENT)
Age: 73
LOS: 14 days | Discharge: HOSPICE | DRG: 190 | End: 2018-03-01
Attending: EMERGENCY MEDICINE | Admitting: INTERNAL MEDICINE
Payer: MEDICARE

## 2018-01-01 ENCOUNTER — APPOINTMENT (OUTPATIENT)
Dept: GENERAL RADIOLOGY | Age: 73
DRG: 190 | End: 2018-01-01
Attending: EMERGENCY MEDICINE
Payer: MEDICARE

## 2018-01-01 ENCOUNTER — APPOINTMENT (OUTPATIENT)
Dept: CT IMAGING | Age: 73
DRG: 190 | End: 2018-01-01
Attending: NURSE PRACTITIONER
Payer: MEDICARE

## 2018-01-01 ENCOUNTER — HOSPICE ADMISSION (OUTPATIENT)
Dept: HOSPICE | Facility: HOSPICE | Age: 73
End: 2018-01-01
Payer: MEDICARE

## 2018-01-01 ENCOUNTER — HOSPITAL ENCOUNTER (INPATIENT)
Age: 73
LOS: 1 days | DRG: 951 | End: 2018-03-02
Attending: INTERNAL MEDICINE | Admitting: INTERNAL MEDICINE
Payer: OTHER MISCELLANEOUS

## 2018-01-01 ENCOUNTER — APPOINTMENT (OUTPATIENT)
Dept: CT IMAGING | Age: 73
DRG: 190 | End: 2018-01-01
Attending: INTERNAL MEDICINE
Payer: MEDICARE

## 2018-01-01 VITALS
SYSTOLIC BLOOD PRESSURE: 110 MMHG | OXYGEN SATURATION: 88 % | HEART RATE: 98 BPM | WEIGHT: 97.8 LBS | BODY MASS INDEX: 18 KG/M2 | RESPIRATION RATE: 18 BRPM | DIASTOLIC BLOOD PRESSURE: 62 MMHG | HEIGHT: 62 IN | TEMPERATURE: 98.2 F

## 2018-01-01 VITALS
RESPIRATION RATE: 14 BRPM | OXYGEN SATURATION: 76 % | DIASTOLIC BLOOD PRESSURE: 40 MMHG | SYSTOLIC BLOOD PRESSURE: 68 MMHG | TEMPERATURE: 98.1 F | HEART RATE: 102 BPM

## 2018-01-01 DIAGNOSIS — G93.40 ENCEPHALOPATHY: ICD-10-CM

## 2018-01-01 DIAGNOSIS — R41.0 INTERMITTENT CONFUSION: ICD-10-CM

## 2018-01-01 DIAGNOSIS — F13.10 BENZODIAZEPINE ABUSE (HCC): ICD-10-CM

## 2018-01-01 DIAGNOSIS — J44.1 COPD EXACERBATION (HCC): Primary | ICD-10-CM

## 2018-01-01 DIAGNOSIS — F32.A ANXIETY AND DEPRESSION: ICD-10-CM

## 2018-01-01 DIAGNOSIS — Z71.89 GOALS OF CARE, COUNSELING/DISCUSSION: ICD-10-CM

## 2018-01-01 DIAGNOSIS — J43.9 PULMONARY EMPHYSEMA, UNSPECIFIED EMPHYSEMA TYPE (HCC): ICD-10-CM

## 2018-01-01 DIAGNOSIS — R06.02 SOB (SHORTNESS OF BREATH): ICD-10-CM

## 2018-01-01 DIAGNOSIS — F41.9 ANXIETY AND DEPRESSION: ICD-10-CM

## 2018-01-01 DIAGNOSIS — J44.1 ACUTE EXACERBATION OF CHRONIC OBSTRUCTIVE PULMONARY DISEASE (COPD) (HCC): ICD-10-CM

## 2018-01-01 DIAGNOSIS — R53.81 DEBILITY: ICD-10-CM

## 2018-01-01 DIAGNOSIS — G93.40 ENCEPHALOPATHY ACUTE: ICD-10-CM

## 2018-01-01 LAB
ABO + RH BLD: NORMAL
ALBUMIN SERPL-MCNC: 1.2 G/DL (ref 3.5–5)
ALBUMIN SERPL-MCNC: 1.5 G/DL (ref 3.5–5)
ALBUMIN SERPL-MCNC: 2 G/DL (ref 3.5–5)
ALBUMIN SERPL-MCNC: 2.1 G/DL (ref 3.5–5)
ALBUMIN SERPL-MCNC: 2.5 G/DL (ref 3.5–5)
ALBUMIN SERPL-MCNC: 3.3 G/DL (ref 3.5–5)
ALBUMIN/GLOB SERPL: 0.3 {RATIO} (ref 1.1–2.2)
ALBUMIN/GLOB SERPL: 0.3 {RATIO} (ref 1.1–2.2)
ALBUMIN/GLOB SERPL: 0.4 {RATIO} (ref 1.1–2.2)
ALBUMIN/GLOB SERPL: 0.5 {RATIO} (ref 1.1–2.2)
ALBUMIN/GLOB SERPL: 0.5 {RATIO} (ref 1.1–2.2)
ALBUMIN/GLOB SERPL: 0.7 {RATIO} (ref 1.1–2.2)
ALP SERPL-CCNC: 126 U/L (ref 45–117)
ALP SERPL-CCNC: 135 U/L (ref 45–117)
ALP SERPL-CCNC: 135 U/L (ref 45–117)
ALP SERPL-CCNC: 151 U/L (ref 45–117)
ALP SERPL-CCNC: 196 U/L (ref 45–117)
ALP SERPL-CCNC: 91 U/L (ref 45–117)
ALT SERPL-CCNC: 12 U/L (ref 12–78)
ALT SERPL-CCNC: 17 U/L (ref 12–78)
ALT SERPL-CCNC: 23 U/L (ref 12–78)
ALT SERPL-CCNC: 26 U/L (ref 12–78)
ALT SERPL-CCNC: 31 U/L (ref 12–78)
ALT SERPL-CCNC: 49 U/L (ref 12–78)
AMMONIA PLAS-SCNC: <10 UMOL/L
ANION GAP SERPL CALC-SCNC: 10 MMOL/L (ref 5–15)
ANION GAP SERPL CALC-SCNC: 11 MMOL/L (ref 5–15)
ANION GAP SERPL CALC-SCNC: 12 MMOL/L (ref 5–15)
ANION GAP SERPL CALC-SCNC: 12 MMOL/L (ref 5–15)
ANION GAP SERPL CALC-SCNC: 15 MMOL/L (ref 5–15)
ANION GAP SERPL CALC-SCNC: 6 MMOL/L (ref 5–15)
ANION GAP SERPL CALC-SCNC: 7 MMOL/L (ref 5–15)
ANION GAP SERPL CALC-SCNC: 8 MMOL/L (ref 5–15)
ANION GAP SERPL CALC-SCNC: 8 MMOL/L (ref 5–15)
APPEARANCE FLD: ABNORMAL
APPEARANCE UR: CLEAR
APTT PPP: 41 SEC (ref 22.1–32)
ARTERIAL PATENCY WRIST A: ABNORMAL
AST SERPL-CCNC: 20 U/L (ref 15–37)
AST SERPL-CCNC: 21 U/L (ref 15–37)
AST SERPL-CCNC: 22 U/L (ref 15–37)
AST SERPL-CCNC: 48 U/L (ref 15–37)
ATRIAL RATE: 100 BPM
ATRIAL RATE: 104 BPM
ATRIAL RATE: 114 BPM
ATRIAL RATE: 125 BPM
ATRIAL RATE: 182 BPM
ATTENDING PHYSICIAN, CST07: NORMAL
BACTERIA SPEC CULT: ABNORMAL
BACTERIA SPEC CULT: NORMAL
BACTERIA URNS QL MICRO: NEGATIVE /HPF
BASE DEFICIT BLDA-SCNC: 6.2 MMOL/L
BASOPHILS # BLD: 0 K/UL (ref 0–0.1)
BASOPHILS NFR BLD: 0 % (ref 0–1)
BDY SITE: ABNORMAL
BILIRUB SERPL-MCNC: 0.3 MG/DL (ref 0.2–1)
BILIRUB SERPL-MCNC: 0.4 MG/DL (ref 0.2–1)
BILIRUB SERPL-MCNC: 0.4 MG/DL (ref 0.2–1)
BILIRUB SERPL-MCNC: 0.5 MG/DL (ref 0.2–1)
BILIRUB UR QL: NEGATIVE
BLD PROD TYP BPU: NORMAL
BLD PROD TYP BPU: NORMAL
BLOOD GROUP ANTIBODIES SERPL: NORMAL
BNP SERPL-MCNC: 3659 PG/ML (ref 0–125)
BPU ID: NORMAL
BPU ID: NORMAL
BUN SERPL-MCNC: 20 MG/DL (ref 6–20)
BUN SERPL-MCNC: 21 MG/DL (ref 6–20)
BUN SERPL-MCNC: 24 MG/DL (ref 6–20)
BUN SERPL-MCNC: 25 MG/DL (ref 6–20)
BUN SERPL-MCNC: 27 MG/DL (ref 6–20)
BUN SERPL-MCNC: 29 MG/DL (ref 6–20)
BUN SERPL-MCNC: 34 MG/DL (ref 6–20)
BUN SERPL-MCNC: 36 MG/DL (ref 6–20)
BUN SERPL-MCNC: 38 MG/DL (ref 6–20)
BUN SERPL-MCNC: 41 MG/DL (ref 6–20)
BUN SERPL-MCNC: 50 MG/DL (ref 6–20)
BUN/CREAT SERPL: 22 (ref 12–20)
BUN/CREAT SERPL: 25 (ref 12–20)
BUN/CREAT SERPL: 26 (ref 12–20)
BUN/CREAT SERPL: 27 (ref 12–20)
BUN/CREAT SERPL: 29 (ref 12–20)
BUN/CREAT SERPL: 29 (ref 12–20)
BUN/CREAT SERPL: 31 (ref 12–20)
BUN/CREAT SERPL: 35 (ref 12–20)
BUN/CREAT SERPL: 35 (ref 12–20)
BUN/CREAT SERPL: 42 (ref 12–20)
BUN/CREAT SERPL: 44 (ref 12–20)
C JEJUNI+C COLI AG STL QL: NEGATIVE
CALCIUM SERPL-MCNC: 7.3 MG/DL (ref 8.5–10.1)
CALCIUM SERPL-MCNC: 7.4 MG/DL (ref 8.5–10.1)
CALCIUM SERPL-MCNC: 7.8 MG/DL (ref 8.5–10.1)
CALCIUM SERPL-MCNC: 8 MG/DL (ref 8.5–10.1)
CALCIUM SERPL-MCNC: 8.2 MG/DL (ref 8.5–10.1)
CALCIUM SERPL-MCNC: 8.2 MG/DL (ref 8.5–10.1)
CALCIUM SERPL-MCNC: 8.3 MG/DL (ref 8.5–10.1)
CALCIUM SERPL-MCNC: 8.6 MG/DL (ref 8.5–10.1)
CALCIUM SERPL-MCNC: 8.7 MG/DL (ref 8.5–10.1)
CALCIUM SERPL-MCNC: 9 MG/DL (ref 8.5–10.1)
CALCIUM SERPL-MCNC: 9.1 MG/DL (ref 8.5–10.1)
CALCULATED P AXIS, ECG09: 52 DEGREES
CALCULATED P AXIS, ECG09: 94 DEGREES
CALCULATED P AXIS, ECG09: 98 DEGREES
CALCULATED R AXIS, ECG10: -27 DEGREES
CALCULATED R AXIS, ECG10: -4 DEGREES
CALCULATED R AXIS, ECG10: -56 DEGREES
CALCULATED R AXIS, ECG10: -57 DEGREES
CALCULATED R AXIS, ECG10: -61 DEGREES
CALCULATED T AXIS, ECG11: -105 DEGREES
CALCULATED T AXIS, ECG11: 49 DEGREES
CALCULATED T AXIS, ECG11: 67 DEGREES
CALCULATED T AXIS, ECG11: 77 DEGREES
CALCULATED T AXIS, ECG11: 85 DEGREES
CHLORIDE SERPL-SCNC: 103 MMOL/L (ref 97–108)
CHLORIDE SERPL-SCNC: 105 MMOL/L (ref 97–108)
CHLORIDE SERPL-SCNC: 106 MMOL/L (ref 97–108)
CHLORIDE SERPL-SCNC: 107 MMOL/L (ref 97–108)
CHLORIDE SERPL-SCNC: 109 MMOL/L (ref 97–108)
CHLORIDE SERPL-SCNC: 110 MMOL/L (ref 97–108)
CHLORIDE SERPL-SCNC: 110 MMOL/L (ref 97–108)
CHLORIDE SERPL-SCNC: 112 MMOL/L (ref 97–108)
CHLORIDE SERPL-SCNC: 114 MMOL/L (ref 97–108)
CHLORIDE SERPL-SCNC: 117 MMOL/L (ref 97–108)
CHLORIDE SERPL-SCNC: 119 MMOL/L (ref 97–108)
CK MB CFR SERPL CALC: 4.5 % (ref 0–2.5)
CK MB CFR SERPL CALC: 4.6 % (ref 0–2.5)
CK MB CFR SERPL CALC: 5.4 % (ref 0–2.5)
CK MB SERPL-MCNC: 19.2 NG/ML (ref 5–25)
CK MB SERPL-MCNC: 2.3 NG/ML (ref 5–25)
CK MB SERPL-MCNC: 2.9 NG/ML (ref 5–25)
CK SERPL-CCNC: 356 U/L (ref 26–192)
CK SERPL-CCNC: 36 U/L (ref 26–192)
CK SERPL-CCNC: 51 U/L (ref 26–192)
CK SERPL-CCNC: 63 U/L (ref 26–192)
CO2 SERPL-SCNC: 10 MMOL/L (ref 21–32)
CO2 SERPL-SCNC: 16 MMOL/L (ref 21–32)
CO2 SERPL-SCNC: 16 MMOL/L (ref 21–32)
CO2 SERPL-SCNC: 18 MMOL/L (ref 21–32)
CO2 SERPL-SCNC: 21 MMOL/L (ref 21–32)
CO2 SERPL-SCNC: 21 MMOL/L (ref 21–32)
CO2 SERPL-SCNC: 23 MMOL/L (ref 21–32)
CO2 SERPL-SCNC: 23 MMOL/L (ref 21–32)
CO2 SERPL-SCNC: 24 MMOL/L (ref 21–32)
CO2 SERPL-SCNC: 24 MMOL/L (ref 21–32)
CO2 SERPL-SCNC: 25 MMOL/L (ref 21–32)
COLOR FLD: ABNORMAL
COLOR UR: ABNORMAL
CREAT SERPL-MCNC: 0.78 MG/DL (ref 0.55–1.02)
CREAT SERPL-MCNC: 0.81 MG/DL (ref 0.55–1.02)
CREAT SERPL-MCNC: 0.83 MG/DL (ref 0.55–1.02)
CREAT SERPL-MCNC: 0.85 MG/DL (ref 0.55–1.02)
CREAT SERPL-MCNC: 0.89 MG/DL (ref 0.55–1.02)
CREAT SERPL-MCNC: 0.92 MG/DL (ref 0.55–1.02)
CREAT SERPL-MCNC: 0.97 MG/DL (ref 0.55–1.02)
CREAT SERPL-MCNC: 1.07 MG/DL (ref 0.55–1.02)
CREAT SERPL-MCNC: 1.1 MG/DL (ref 0.55–1.02)
CREAT SERPL-MCNC: 1.17 MG/DL (ref 0.55–1.02)
CREAT SERPL-MCNC: 1.71 MG/DL (ref 0.55–1.02)
CROSSMATCH RESULT,%XM: NORMAL
CROSSMATCH RESULT,%XM: NORMAL
DATE LAST DOSE: ABNORMAL
DIAGNOSIS, 93000: NORMAL
DIFFERENTIAL METHOD BLD: ABNORMAL
DUKE TM SCORE RESULT, CST14: NORMAL
DUKE TREADMILL SCORE, CST13: 5456
E COLI SXT1+2 STL IA: NO GROWTH
ECG INTERP BEFORE EX, CST11: NORMAL
ECG INTERP DURING EX, CST12: NORMAL
EOSINOPHIL # BLD: 0 K/UL (ref 0–0.4)
EOSINOPHIL NFR BLD: 0 % (ref 0–7)
EPITH CASTS URNS QL MICRO: ABNORMAL /LPF
ERYTHROCYTE [DISTWIDTH] IN BLOOD BY AUTOMATED COUNT: 19.6 % (ref 11.5–14.5)
ERYTHROCYTE [DISTWIDTH] IN BLOOD BY AUTOMATED COUNT: 19.6 % (ref 11.5–14.5)
ERYTHROCYTE [DISTWIDTH] IN BLOOD BY AUTOMATED COUNT: 19.8 % (ref 11.5–14.5)
ERYTHROCYTE [DISTWIDTH] IN BLOOD BY AUTOMATED COUNT: 20 % (ref 11.5–14.5)
ERYTHROCYTE [DISTWIDTH] IN BLOOD BY AUTOMATED COUNT: 20.1 % (ref 11.5–14.5)
ERYTHROCYTE [DISTWIDTH] IN BLOOD BY AUTOMATED COUNT: 20.1 % (ref 11.5–14.5)
ERYTHROCYTE [DISTWIDTH] IN BLOOD BY AUTOMATED COUNT: 20.6 % (ref 11.5–14.5)
ERYTHROCYTE [DISTWIDTH] IN BLOOD BY AUTOMATED COUNT: 21 % (ref 11.5–14.5)
ERYTHROCYTE [DISTWIDTH] IN BLOOD BY AUTOMATED COUNT: 21 % (ref 11.5–14.5)
FOLATE SERPL-MCNC: 10 NG/ML (ref 5–21)
FUNCTIONAL CAPACITY, CST17: NORMAL
GAS FLOW.O2 O2 DELIVERY SYS: 3 L/MIN
GLOBULIN SER CALC-MCNC: 3.8 G/DL (ref 2–4)
GLOBULIN SER CALC-MCNC: 4.4 G/DL (ref 2–4)
GLOBULIN SER CALC-MCNC: 4.6 G/DL (ref 2–4)
GLOBULIN SER CALC-MCNC: 4.7 G/DL (ref 2–4)
GLOBULIN SER CALC-MCNC: 4.9 G/DL (ref 2–4)
GLOBULIN SER CALC-MCNC: 5 G/DL (ref 2–4)
GLUCOSE BLD STRIP.AUTO-MCNC: 107 MG/DL (ref 65–100)
GLUCOSE BLD STRIP.AUTO-MCNC: 118 MG/DL (ref 65–100)
GLUCOSE BLD STRIP.AUTO-MCNC: 144 MG/DL (ref 65–100)
GLUCOSE BLD STRIP.AUTO-MCNC: 157 MG/DL (ref 65–100)
GLUCOSE SERPL-MCNC: 117 MG/DL (ref 65–100)
GLUCOSE SERPL-MCNC: 118 MG/DL (ref 65–100)
GLUCOSE SERPL-MCNC: 124 MG/DL (ref 65–100)
GLUCOSE SERPL-MCNC: 125 MG/DL (ref 65–100)
GLUCOSE SERPL-MCNC: 128 MG/DL (ref 65–100)
GLUCOSE SERPL-MCNC: 135 MG/DL (ref 65–100)
GLUCOSE SERPL-MCNC: 137 MG/DL (ref 65–100)
GLUCOSE SERPL-MCNC: 148 MG/DL (ref 65–100)
GLUCOSE SERPL-MCNC: 168 MG/DL (ref 65–100)
GLUCOSE SERPL-MCNC: 86 MG/DL (ref 65–100)
GLUCOSE SERPL-MCNC: 89 MG/DL (ref 65–100)
GLUCOSE UR STRIP.AUTO-MCNC: NEGATIVE MG/DL
GRAM STN SPEC: ABNORMAL
HCO3 BLDA-SCNC: 14 MMOL/L (ref 22–26)
HCT VFR BLD AUTO: 20.5 % (ref 35–47)
HCT VFR BLD AUTO: 21.7 % (ref 35–47)
HCT VFR BLD AUTO: 25.7 % (ref 35–47)
HCT VFR BLD AUTO: 26.7 % (ref 35–47)
HCT VFR BLD AUTO: 27.2 % (ref 35–47)
HCT VFR BLD AUTO: 28.8 % (ref 35–47)
HCT VFR BLD AUTO: 29.7 % (ref 35–47)
HCT VFR BLD AUTO: 30.5 % (ref 35–47)
HCT VFR BLD AUTO: 30.6 % (ref 35–47)
HCT VFR BLD AUTO: 30.6 % (ref 35–47)
HCT VFR BLD AUTO: 30.7 % (ref 35–47)
HCT VFR BLD AUTO: 32.1 % (ref 35–47)
HCT VFR BLD AUTO: 33.1 % (ref 35–47)
HCT VFR BLD AUTO: 33.2 % (ref 35–47)
HCT VFR BLD AUTO: 33.6 % (ref 35–47)
HEMOCCULT STL QL: POSITIVE
HGB BLD-MCNC: 10 G/DL (ref 11.5–16)
HGB BLD-MCNC: 10.1 G/DL (ref 11.5–16)
HGB BLD-MCNC: 10.1 G/DL (ref 11.5–16)
HGB BLD-MCNC: 10.2 G/DL (ref 11.5–16)
HGB BLD-MCNC: 10.4 G/DL (ref 11.5–16)
HGB BLD-MCNC: 6.8 G/DL (ref 11.5–16)
HGB BLD-MCNC: 6.8 G/DL (ref 11.5–16)
HGB BLD-MCNC: 8.3 G/DL (ref 11.5–16)
HGB BLD-MCNC: 8.3 G/DL (ref 11.5–16)
HGB BLD-MCNC: 8.9 G/DL (ref 11.5–16)
HGB BLD-MCNC: 9 G/DL (ref 11.5–16)
HGB BLD-MCNC: 9.2 G/DL (ref 11.5–16)
HGB BLD-MCNC: 9.6 G/DL (ref 11.5–16)
HGB BLD-MCNC: 9.8 G/DL (ref 11.5–16)
HGB BLD-MCNC: 9.9 G/DL (ref 11.5–16)
HGB UR QL STRIP: ABNORMAL
IMM GRANULOCYTES # BLD: 0 K/UL
IMM GRANULOCYTES # BLD: 0 K/UL (ref 0–0.04)
IMM GRANULOCYTES # BLD: 0.1 K/UL (ref 0–0.04)
IMM GRANULOCYTES # BLD: 0.1 K/UL (ref 0–0.04)
IMM GRANULOCYTES NFR BLD AUTO: 0 %
IMM GRANULOCYTES NFR BLD AUTO: 0 % (ref 0–0.5)
IMM GRANULOCYTES NFR BLD AUTO: 1 % (ref 0–0.5)
IMM GRANULOCYTES NFR BLD AUTO: 1 % (ref 0–0.5)
INR PPP: 1.8 (ref 0.9–1.1)
KETONES UR QL STRIP.AUTO: NEGATIVE MG/DL
KNOWN CARDIAC CONDITION, CST08: NORMAL
LACTATE SERPL-SCNC: 1.4 MMOL/L (ref 0.4–2)
LACTATE SERPL-SCNC: 1.6 MMOL/L (ref 0.4–2)
LACTATE SERPL-SCNC: 1.7 MMOL/L (ref 0.4–2)
LACTATE SERPL-SCNC: 2.9 MMOL/L (ref 0.4–2)
LACTATE SERPL-SCNC: 3 MMOL/L (ref 0.4–2)
LEUKOCYTE ESTERASE UR QL STRIP.AUTO: NEGATIVE
LIPASE SERPL-CCNC: 265 U/L (ref 73–393)
LYMPHOCYTES # BLD: 0.3 K/UL (ref 0.8–3.5)
LYMPHOCYTES # BLD: 0.4 K/UL (ref 0.8–3.5)
LYMPHOCYTES # BLD: 0.4 K/UL (ref 0.8–3.5)
LYMPHOCYTES # BLD: 0.5 K/UL (ref 0.8–3.5)
LYMPHOCYTES # BLD: 0.7 K/UL (ref 0.8–3.5)
LYMPHOCYTES # BLD: 0.7 K/UL (ref 0.8–3.5)
LYMPHOCYTES # BLD: 0.8 K/UL (ref 0.8–3.5)
LYMPHOCYTES # BLD: 1.1 K/UL (ref 0.8–3.5)
LYMPHOCYTES NFR BLD: 1 % (ref 12–49)
LYMPHOCYTES NFR BLD: 2 % (ref 12–49)
LYMPHOCYTES NFR BLD: 2 % (ref 12–49)
LYMPHOCYTES NFR BLD: 3 % (ref 12–49)
LYMPHOCYTES NFR BLD: 6 % (ref 12–49)
LYMPHOCYTES NFR BLD: 8 % (ref 12–49)
MAGNESIUM SERPL-MCNC: 1.9 MG/DL (ref 1.6–2.4)
MAGNESIUM SERPL-MCNC: 2 MG/DL (ref 1.6–2.4)
MAGNESIUM SERPL-MCNC: 2.1 MG/DL (ref 1.6–2.4)
MAGNESIUM SERPL-MCNC: 2.1 MG/DL (ref 1.6–2.4)
MAX. DIASTOLIC BP, CST04: 75 MMHG
MAX. HEART RATE, CST05: 117 BPM
MAX. SYSTOLIC BP, CST03: 165 MMHG
MCH RBC QN AUTO: 22.9 PG (ref 26–34)
MCH RBC QN AUTO: 23.1 PG (ref 26–34)
MCH RBC QN AUTO: 23.1 PG (ref 26–34)
MCH RBC QN AUTO: 23.3 PG (ref 26–34)
MCH RBC QN AUTO: 23.4 PG (ref 26–34)
MCH RBC QN AUTO: 23.4 PG (ref 26–34)
MCH RBC QN AUTO: 23.5 PG (ref 26–34)
MCH RBC QN AUTO: 25.7 PG (ref 26–34)
MCH RBC QN AUTO: 26.3 PG (ref 26–34)
MCHC RBC AUTO-ENTMCNC: 30.4 G/DL (ref 30–36.5)
MCHC RBC AUTO-ENTMCNC: 30.5 G/DL (ref 30–36.5)
MCHC RBC AUTO-ENTMCNC: 31 G/DL (ref 30–36.5)
MCHC RBC AUTO-ENTMCNC: 31.2 G/DL (ref 30–36.5)
MCHC RBC AUTO-ENTMCNC: 31.3 G/DL (ref 30–36.5)
MCHC RBC AUTO-ENTMCNC: 31.3 G/DL (ref 30–36.5)
MCHC RBC AUTO-ENTMCNC: 31.4 G/DL (ref 30–36.5)
MCHC RBC AUTO-ENTMCNC: 31.9 G/DL (ref 30–36.5)
MCHC RBC AUTO-ENTMCNC: 32.3 G/DL (ref 30–36.5)
MCV RBC AUTO: 73.8 FL (ref 80–99)
MCV RBC AUTO: 74.3 FL (ref 80–99)
MCV RBC AUTO: 74.6 FL (ref 80–99)
MCV RBC AUTO: 75.4 FL (ref 80–99)
MCV RBC AUTO: 75.5 FL (ref 80–99)
MCV RBC AUTO: 75.5 FL (ref 80–99)
MCV RBC AUTO: 75.7 FL (ref 80–99)
MCV RBC AUTO: 80.4 FL (ref 80–99)
MCV RBC AUTO: 81.6 FL (ref 80–99)
MONOCYTES # BLD: 0.3 K/UL (ref 0–1)
MONOCYTES # BLD: 0.4 K/UL (ref 0–1)
MONOCYTES # BLD: 0.7 K/UL (ref 0–1)
MONOCYTES # BLD: 0.8 K/UL (ref 0–1)
MONOCYTES # BLD: 1.3 K/UL (ref 0–1)
MONOCYTES # BLD: 1.3 K/UL (ref 0–1)
MONOCYTES # BLD: 1.4 K/UL (ref 0–1)
MONOCYTES # BLD: 1.8 K/UL (ref 0–1)
MONOCYTES NFR BLD: 1 % (ref 5–13)
MONOCYTES NFR BLD: 2 % (ref 5–13)
MONOCYTES NFR BLD: 3 % (ref 5–13)
MONOCYTES NFR BLD: 4 % (ref 5–13)
MONOCYTES NFR BLD: 8 % (ref 5–13)
MONOS+MACROS NFR FLD: 4 %
NEUTROPHILS NFR FLD: 96 %
NEUTS BAND NFR BLD MANUAL: 1 %
NEUTS BAND NFR BLD MANUAL: 1 %
NEUTS SEG # BLD: 31.2 K/UL (ref 1.8–8)
NEUTS SEG # BLD: 32.5 K/UL (ref 1.8–8)
NEUTS SEG # BLD: 32.8 K/UL (ref 1.8–8)
NEUTS SEG # BLD: 35.8 K/UL (ref 1.8–8)
NEUTS SEG # BLD: 41.2 K/UL (ref 1.8–8)
NEUTS SEG # BLD: 42.2 K/UL (ref 1.8–8)
NEUTS SEG # BLD: 7.3 K/UL (ref 1.8–8)
NEUTS SEG # BLD: 8.7 K/UL (ref 1.8–8)
NEUTS SEG NFR BLD: 84 % (ref 32–75)
NEUTS SEG NFR BLD: 89 % (ref 32–75)
NEUTS SEG NFR BLD: 92 % (ref 32–75)
NEUTS SEG NFR BLD: 94 % (ref 32–75)
NEUTS SEG NFR BLD: 94 % (ref 32–75)
NEUTS SEG NFR BLD: 96 % (ref 32–75)
NEUTS SEG NFR BLD: 97 % (ref 32–75)
NEUTS SEG NFR BLD: 97 % (ref 32–75)
NITRITE UR QL STRIP.AUTO: NEGATIVE
NRBC # BLD: 0 K/UL (ref 0–0.01)
NRBC # BLD: 0.02 K/UL (ref 0–0.01)
NRBC # BLD: 0.02 K/UL (ref 0–0.01)
NRBC # BLD: 0.03 K/UL (ref 0–0.01)
NRBC # BLD: 0.03 K/UL (ref 0–0.01)
NRBC BLD-RTO: 0 PER 100 WBC
NRBC BLD-RTO: 0.1 PER 100 WBC
NRBC BLD-RTO: 0.1 PER 100 WBC
NRBC BLD-RTO: 0.2 PER 100 WBC
NRBC BLD-RTO: 0.4 PER 100 WBC
NUC CELL # FLD: 1190 /CU MM
O+P SPEC MICRO: NORMAL
O+P STL CONC: NORMAL
OVERALL BP RESPONSE TO EXERCISE, CST16: NORMAL
OVERALL HR RESPONSE TO EXERCISE, CST15: NORMAL
P-R INTERVAL, ECG05: 118 MS
P-R INTERVAL, ECG05: 130 MS
P-R INTERVAL, ECG05: 88 MS
PATH REV BLD -IMP: ABNORMAL
PATH REV BLD -IMP: NORMAL
PCO2 BLDA: 19 MMHG (ref 35–45)
PEAK EX METS, CST10: 1 METS
PH BLDA: 7.49 [PH] (ref 7.35–7.45)
PH UR STRIP: 5.5 [PH] (ref 5–8)
PLATELET # BLD AUTO: 205 K/UL (ref 150–400)
PLATELET # BLD AUTO: 286 K/UL (ref 150–400)
PLATELET # BLD AUTO: 306 K/UL (ref 150–400)
PLATELET # BLD AUTO: 339 K/UL (ref 150–400)
PLATELET # BLD AUTO: 340 K/UL (ref 150–400)
PLATELET # BLD AUTO: 425 K/UL (ref 150–400)
PLATELET # BLD AUTO: 426 K/UL (ref 150–400)
PLATELET # BLD AUTO: 509 K/UL (ref 150–400)
PLATELET # BLD AUTO: 527 K/UL (ref 150–400)
PMV BLD AUTO: 10 FL (ref 8.9–12.9)
PMV BLD AUTO: 10.2 FL (ref 8.9–12.9)
PMV BLD AUTO: 10.3 FL (ref 8.9–12.9)
PMV BLD AUTO: 10.5 FL (ref 8.9–12.9)
PMV BLD AUTO: 10.6 FL (ref 8.9–12.9)
PMV BLD AUTO: 10.6 FL (ref 8.9–12.9)
PMV BLD AUTO: 11.2 FL (ref 8.9–12.9)
PMV BLD AUTO: 11.3 FL (ref 8.9–12.9)
PMV BLD AUTO: 9.8 FL (ref 8.9–12.9)
PO2 BLDA: 61 MMHG (ref 80–100)
POTASSIUM SERPL-SCNC: 3.1 MMOL/L (ref 3.5–5.1)
POTASSIUM SERPL-SCNC: 3.2 MMOL/L (ref 3.5–5.1)
POTASSIUM SERPL-SCNC: 3.3 MMOL/L (ref 3.5–5.1)
POTASSIUM SERPL-SCNC: 3.5 MMOL/L (ref 3.5–5.1)
POTASSIUM SERPL-SCNC: 3.6 MMOL/L (ref 3.5–5.1)
POTASSIUM SERPL-SCNC: 3.7 MMOL/L (ref 3.5–5.1)
POTASSIUM SERPL-SCNC: 3.8 MMOL/L (ref 3.5–5.1)
POTASSIUM SERPL-SCNC: 3.9 MMOL/L (ref 3.5–5.1)
POTASSIUM SERPL-SCNC: 4.1 MMOL/L (ref 3.5–5.1)
PROT SERPL-MCNC: 5 G/DL (ref 6.4–8.2)
PROT SERPL-MCNC: 6.2 G/DL (ref 6.4–8.2)
PROT SERPL-MCNC: 6.5 G/DL (ref 6.4–8.2)
PROT SERPL-MCNC: 6.6 G/DL (ref 6.4–8.2)
PROT SERPL-MCNC: 7.5 G/DL (ref 6.4–8.2)
PROT SERPL-MCNC: 8.2 G/DL (ref 6.4–8.2)
PROT UR STRIP-MCNC: 300 MG/DL
PROTHROMBIN TIME: 18.6 SEC (ref 9–11.1)
PROTOCOL NAME, CST01: NORMAL
Q-T INTERVAL, ECG07: 270 MS
Q-T INTERVAL, ECG07: 278 MS
Q-T INTERVAL, ECG07: 328 MS
Q-T INTERVAL, ECG07: 352 MS
Q-T INTERVAL, ECG07: 360 MS
QRS DURATION, ECG06: 56 MS
QRS DURATION, ECG06: 66 MS
QRS DURATION, ECG06: 66 MS
QRS DURATION, ECG06: 68 MS
QRS DURATION, ECG06: 72 MS
QTC CALCULATION (BEZET), ECG08: 452 MS
QTC CALCULATION (BEZET), ECG08: 454 MS
QTC CALCULATION (BEZET), ECG08: 462 MS
QTC CALCULATION (BEZET), ECG08: 464 MS
QTC CALCULATION (BEZET), ECG08: 474 MS
RBC # BLD AUTO: 3.15 M/UL (ref 3.8–5.2)
RBC # BLD AUTO: 3.82 M/UL (ref 3.8–5.2)
RBC # BLD AUTO: 3.9 M/UL (ref 3.8–5.2)
RBC # BLD AUTO: 3.94 M/UL (ref 3.8–5.2)
RBC # BLD AUTO: 4.1 M/UL (ref 3.8–5.2)
RBC # BLD AUTO: 4.25 M/UL (ref 3.8–5.2)
RBC # BLD AUTO: 4.37 M/UL (ref 3.8–5.2)
RBC # BLD AUTO: 4.45 M/UL (ref 3.8–5.2)
RBC # BLD AUTO: 4.47 M/UL (ref 3.8–5.2)
RBC # FLD: >100 /CU MM
RBC #/AREA URNS HPF: ABNORMAL /HPF (ref 0–5)
RBC MORPH BLD: ABNORMAL
REPORTED DOSE,DOSE: ABNORMAL UNITS
REPORTED DOSE/TIME,TMG: ABNORMAL
SAO2 % BLD: 94 % (ref 92–97)
SAO2% DEVICE SAO2% SENSOR NAME: ABNORMAL
SERVICE CMNT-IMP: ABNORMAL
SERVICE CMNT-IMP: NORMAL
SODIUM SERPL-SCNC: 136 MMOL/L (ref 136–145)
SODIUM SERPL-SCNC: 137 MMOL/L (ref 136–145)
SODIUM SERPL-SCNC: 137 MMOL/L (ref 136–145)
SODIUM SERPL-SCNC: 138 MMOL/L (ref 136–145)
SODIUM SERPL-SCNC: 140 MMOL/L (ref 136–145)
SODIUM SERPL-SCNC: 141 MMOL/L (ref 136–145)
SODIUM SERPL-SCNC: 142 MMOL/L (ref 136–145)
SODIUM SERPL-SCNC: 142 MMOL/L (ref 136–145)
SODIUM SERPL-SCNC: 147 MMOL/L (ref 136–145)
SP GR UR REFRACTOMETRY: 1.02 (ref 1–1.03)
SPECIMEN EXP DATE BLD: NORMAL
SPECIMEN SITE: ABNORMAL
SPECIMEN SOURCE FLD: ABNORMAL
SPECIMEN SOURCE: NORMAL
STATUS OF UNIT,%ST: NORMAL
STATUS OF UNIT,%ST: NORMAL
TEST INDICATION, CST09: NORMAL
THERAPEUTIC RANGE,PTTT: ABNORMAL SECS (ref 58–77)
TROPONIN I SERPL-MCNC: 0.04 NG/ML
TROPONIN I SERPL-MCNC: 0.06 NG/ML
TROPONIN I SERPL-MCNC: 0.06 NG/ML
TROPONIN I SERPL-MCNC: <0.04 NG/ML
TSH SERPL DL<=0.05 MIU/L-ACNC: 0.74 UIU/ML (ref 0.36–3.74)
UA: UC IF INDICATED,UAUC: ABNORMAL
UNIT DIVISION, %UDIV: 0
UNIT DIVISION, %UDIV: 0
UROBILINOGEN UR QL STRIP.AUTO: 0.2 EU/DL (ref 0.2–1)
VANCOMYCIN TROUGH SERPL-MCNC: 19.1 UG/ML (ref 5–10)
VENTRICULAR RATE, ECG03: 100 BPM
VENTRICULAR RATE, ECG03: 104 BPM
VENTRICULAR RATE, ECG03: 114 BPM
VENTRICULAR RATE, ECG03: 170 BPM
VENTRICULAR RATE, ECG03: 175 BPM
VIT B12 SERPL-MCNC: 743 PG/ML (ref 211–911)
WBC # BLD AUTO: 10.3 K/UL (ref 3.6–11)
WBC # BLD AUTO: 33.2 K/UL (ref 3.6–11)
WBC # BLD AUTO: 33.5 K/UL (ref 3.6–11)
WBC # BLD AUTO: 35.3 K/UL (ref 3.6–11)
WBC # BLD AUTO: 36.9 K/UL (ref 3.6–11)
WBC # BLD AUTO: 42.9 K/UL (ref 3.6–11)
WBC # BLD AUTO: 44.4 K/UL (ref 3.6–11)
WBC # BLD AUTO: 46.7 K/UL (ref 3.6–11)
WBC # BLD AUTO: 8.2 K/UL (ref 3.6–11)
WBC #/AREA STL HPF: NORMAL /HPF (ref 0–4)
WBC MORPH BLD: ABNORMAL
WBC URNS QL MICRO: ABNORMAL /HPF (ref 0–4)

## 2018-01-01 PROCEDURE — 74011250636 HC RX REV CODE- 250/636: Performed by: INTERNAL MEDICINE

## 2018-01-01 PROCEDURE — 74176 CT ABD & PELVIS W/O CONTRAST: CPT

## 2018-01-01 PROCEDURE — 74011000250 HC RX REV CODE- 250: Performed by: INTERNAL MEDICINE

## 2018-01-01 PROCEDURE — 36415 COLL VENOUS BLD VENIPUNCTURE: CPT | Performed by: INTERNAL MEDICINE

## 2018-01-01 PROCEDURE — C9113 INJ PANTOPRAZOLE SODIUM, VIA: HCPCS | Performed by: INTERNAL MEDICINE

## 2018-01-01 PROCEDURE — 74011250637 HC RX REV CODE- 250/637: Performed by: INTERNAL MEDICINE

## 2018-01-01 PROCEDURE — 74011000258 HC RX REV CODE- 258: Performed by: INTERNAL MEDICINE

## 2018-01-01 PROCEDURE — 74018 RADEX ABDOMEN 1 VIEW: CPT

## 2018-01-01 PROCEDURE — 93005 ELECTROCARDIOGRAM TRACING: CPT

## 2018-01-01 PROCEDURE — 0656 HSPC GENERAL INPATIENT

## 2018-01-01 PROCEDURE — 74011000250 HC RX REV CODE- 250

## 2018-01-01 PROCEDURE — 74011000258 HC RX REV CODE- 258: Performed by: EMERGENCY MEDICINE

## 2018-01-01 PROCEDURE — 86900 BLOOD TYPING SEROLOGIC ABO: CPT | Performed by: NURSE PRACTITIONER

## 2018-01-01 PROCEDURE — 82550 ASSAY OF CK (CPK): CPT | Performed by: EMERGENCY MEDICINE

## 2018-01-01 PROCEDURE — 93306 TTE W/DOPPLER COMPLETE: CPT

## 2018-01-01 PROCEDURE — 92526 ORAL FUNCTION THERAPY: CPT

## 2018-01-01 PROCEDURE — 82550 ASSAY OF CK (CPK): CPT | Performed by: NURSE PRACTITIONER

## 2018-01-01 PROCEDURE — 85025 COMPLETE CBC W/AUTO DIFF WBC: CPT | Performed by: INTERNAL MEDICINE

## 2018-01-01 PROCEDURE — 77030018836 HC SOL IRR NACL ICUM -A

## 2018-01-01 PROCEDURE — 87070 CULTURE OTHR SPECIMN AEROBIC: CPT | Performed by: INTERNAL MEDICINE

## 2018-01-01 PROCEDURE — 87206 SMEAR FLUORESCENT/ACID STAI: CPT | Performed by: INTERNAL MEDICINE

## 2018-01-01 PROCEDURE — 82962 GLUCOSE BLOOD TEST: CPT

## 2018-01-01 PROCEDURE — 84484 ASSAY OF TROPONIN QUANT: CPT | Performed by: EMERGENCY MEDICINE

## 2018-01-01 PROCEDURE — 36415 COLL VENOUS BLD VENIPUNCTURE: CPT | Performed by: EMERGENCY MEDICINE

## 2018-01-01 PROCEDURE — 74011250636 HC RX REV CODE- 250/636: Performed by: NURSE PRACTITIONER

## 2018-01-01 PROCEDURE — 84484 ASSAY OF TROPONIN QUANT: CPT | Performed by: INTERNAL MEDICINE

## 2018-01-01 PROCEDURE — 81001 URINALYSIS AUTO W/SCOPE: CPT | Performed by: EMERGENCY MEDICINE

## 2018-01-01 PROCEDURE — 74011250637 HC RX REV CODE- 250/637: Performed by: NURSE PRACTITIONER

## 2018-01-01 PROCEDURE — 94640 AIRWAY INHALATION TREATMENT: CPT

## 2018-01-01 PROCEDURE — 82803 BLOOD GASES ANY COMBINATION: CPT | Performed by: INTERNAL MEDICINE

## 2018-01-01 PROCEDURE — 83735 ASSAY OF MAGNESIUM: CPT | Performed by: NURSE PRACTITIONER

## 2018-01-01 PROCEDURE — 80053 COMPREHEN METABOLIC PANEL: CPT | Performed by: INTERNAL MEDICINE

## 2018-01-01 PROCEDURE — 74011636637 HC RX REV CODE- 636/637: Performed by: INTERNAL MEDICINE

## 2018-01-01 PROCEDURE — 77010033678 HC OXYGEN DAILY

## 2018-01-01 PROCEDURE — 85025 COMPLETE CBC W/AUTO DIFF WBC: CPT | Performed by: NURSE PRACTITIONER

## 2018-01-01 PROCEDURE — 71045 X-RAY EXAM CHEST 1 VIEW: CPT

## 2018-01-01 PROCEDURE — 82746 ASSAY OF FOLIC ACID SERUM: CPT | Performed by: NURSE PRACTITIONER

## 2018-01-01 PROCEDURE — 74011000250 HC RX REV CODE- 250: Performed by: EMERGENCY MEDICINE

## 2018-01-01 PROCEDURE — G0299 HHS/HOSPICE OF RN EA 15 MIN: HCPCS

## 2018-01-01 PROCEDURE — 83735 ASSAY OF MAGNESIUM: CPT | Performed by: INTERNAL MEDICINE

## 2018-01-01 PROCEDURE — 80053 COMPREHEN METABOLIC PANEL: CPT | Performed by: EMERGENCY MEDICINE

## 2018-01-01 PROCEDURE — 36600 WITHDRAWAL OF ARTERIAL BLOOD: CPT | Performed by: INTERNAL MEDICINE

## 2018-01-01 PROCEDURE — 96375 TX/PRO/DX INJ NEW DRUG ADDON: CPT

## 2018-01-01 PROCEDURE — 82607 VITAMIN B-12: CPT | Performed by: NURSE PRACTITIONER

## 2018-01-01 PROCEDURE — 65660000000 HC RM CCU STEPDOWN

## 2018-01-01 PROCEDURE — 80048 BASIC METABOLIC PNL TOTAL CA: CPT | Performed by: INTERNAL MEDICINE

## 2018-01-01 PROCEDURE — 99152 MOD SED SAME PHYS/QHP 5/>YRS: CPT

## 2018-01-01 PROCEDURE — 87177 OVA AND PARASITES SMEARS: CPT | Performed by: INTERNAL MEDICINE

## 2018-01-01 PROCEDURE — 97530 THERAPEUTIC ACTIVITIES: CPT | Performed by: PHYSICAL THERAPIST

## 2018-01-01 PROCEDURE — 94761 N-INVAS EAR/PLS OXIMETRY MLT: CPT

## 2018-01-01 PROCEDURE — 85018 HEMOGLOBIN: CPT | Performed by: NURSE PRACTITIONER

## 2018-01-01 PROCEDURE — G8978 MOBILITY CURRENT STATUS: HCPCS

## 2018-01-01 PROCEDURE — 74011250636 HC RX REV CODE- 250/636

## 2018-01-01 PROCEDURE — 65270000015 HC RM PRIVATE ONCOLOGY

## 2018-01-01 PROCEDURE — 83605 ASSAY OF LACTIC ACID: CPT | Performed by: INTERNAL MEDICINE

## 2018-01-01 PROCEDURE — 70450 CT HEAD/BRAIN W/O DYE: CPT

## 2018-01-01 PROCEDURE — 87102 FUNGUS ISOLATION CULTURE: CPT | Performed by: INTERNAL MEDICINE

## 2018-01-01 PROCEDURE — 65610000006 HC RM INTENSIVE CARE

## 2018-01-01 PROCEDURE — P9016 RBC LEUKOCYTES REDUCED: HCPCS | Performed by: NURSE PRACTITIONER

## 2018-01-01 PROCEDURE — 77030012699 HC VLV SUC CNTRL OCOA -A

## 2018-01-01 PROCEDURE — 99285 EMERGENCY DEPT VISIT HI MDM: CPT

## 2018-01-01 PROCEDURE — 36415 COLL VENOUS BLD VENIPUNCTURE: CPT | Performed by: NURSE PRACTITIONER

## 2018-01-01 PROCEDURE — 92610 EVALUATE SWALLOWING FUNCTION: CPT

## 2018-01-01 PROCEDURE — 80053 COMPREHEN METABOLIC PANEL: CPT | Performed by: NURSE PRACTITIONER

## 2018-01-01 PROCEDURE — 82140 ASSAY OF AMMONIA: CPT | Performed by: INTERNAL MEDICINE

## 2018-01-01 PROCEDURE — 85018 HEMOGLOBIN: CPT | Performed by: INTERNAL MEDICINE

## 2018-01-01 PROCEDURE — 71046 X-RAY EXAM CHEST 2 VIEWS: CPT

## 2018-01-01 PROCEDURE — 93017 CV STRESS TEST TRACING ONLY: CPT

## 2018-01-01 PROCEDURE — 84443 ASSAY THYROID STIM HORMONE: CPT | Performed by: NURSE PRACTITIONER

## 2018-01-01 PROCEDURE — 82272 OCCULT BLD FECES 1-3 TESTS: CPT | Performed by: NURSE PRACTITIONER

## 2018-01-01 PROCEDURE — 31625 BRONCHOSCOPY W/BIOPSY(S): CPT

## 2018-01-01 PROCEDURE — 83690 ASSAY OF LIPASE: CPT | Performed by: NURSE PRACTITIONER

## 2018-01-01 PROCEDURE — 87040 BLOOD CULTURE FOR BACTERIA: CPT | Performed by: INTERNAL MEDICINE

## 2018-01-01 PROCEDURE — 77030029684 HC NEB SM VOL KT MONA -A

## 2018-01-01 PROCEDURE — 95816 EEG AWAKE AND DROWSY: CPT | Performed by: PSYCHIATRY & NEUROLOGY

## 2018-01-01 PROCEDURE — 88112 CYTOPATH CELL ENHANCE TECH: CPT | Performed by: INTERNAL MEDICINE

## 2018-01-01 PROCEDURE — 83605 ASSAY OF LACTIC ACID: CPT | Performed by: NURSE PRACTITIONER

## 2018-01-01 PROCEDURE — 77030012879 HC MSK CPAP FLL FAC PHIL -B

## 2018-01-01 PROCEDURE — 74011250636 HC RX REV CODE- 250/636: Performed by: EMERGENCY MEDICINE

## 2018-01-01 PROCEDURE — 80048 BASIC METABOLIC PNL TOTAL CA: CPT | Performed by: NURSE PRACTITIONER

## 2018-01-01 PROCEDURE — 99222 1ST HOSP IP/OBS MODERATE 55: CPT | Performed by: INTERNAL MEDICINE

## 2018-01-01 PROCEDURE — 87040 BLOOD CULTURE FOR BACTERIA: CPT | Performed by: EMERGENCY MEDICINE

## 2018-01-01 PROCEDURE — 85027 COMPLETE CBC AUTOMATED: CPT | Performed by: INTERNAL MEDICINE

## 2018-01-01 PROCEDURE — A9500 TC99M SESTAMIBI: HCPCS

## 2018-01-01 PROCEDURE — 89055 LEUKOCYTE ASSESSMENT FECAL: CPT | Performed by: INTERNAL MEDICINE

## 2018-01-01 PROCEDURE — 96365 THER/PROPH/DIAG IV INF INIT: CPT

## 2018-01-01 PROCEDURE — 85730 THROMBOPLASTIN TIME PARTIAL: CPT | Performed by: INTERNAL MEDICINE

## 2018-01-01 PROCEDURE — 74011250637 HC RX REV CODE- 250/637

## 2018-01-01 PROCEDURE — 3336500001 HSPC ELECTION

## 2018-01-01 PROCEDURE — 77030013140 HC MSK NEB VYRM -A

## 2018-01-01 PROCEDURE — 89050 BODY FLUID CELL COUNT: CPT | Performed by: INTERNAL MEDICINE

## 2018-01-01 PROCEDURE — 77030009834 HC MSK O2 TRACH VYRM -A

## 2018-01-01 PROCEDURE — 36430 TRANSFUSION BLD/BLD COMPNT: CPT

## 2018-01-01 PROCEDURE — 97116 GAIT TRAINING THERAPY: CPT

## 2018-01-01 PROCEDURE — 97530 THERAPEUTIC ACTIVITIES: CPT

## 2018-01-01 PROCEDURE — 83605 ASSAY OF LACTIC ACID: CPT | Performed by: EMERGENCY MEDICINE

## 2018-01-01 PROCEDURE — 85610 PROTHROMBIN TIME: CPT | Performed by: INTERNAL MEDICINE

## 2018-01-01 PROCEDURE — 83880 ASSAY OF NATRIURETIC PEPTIDE: CPT | Performed by: EMERGENCY MEDICINE

## 2018-01-01 PROCEDURE — 97161 PT EVAL LOW COMPLEX 20 MIN: CPT

## 2018-01-01 PROCEDURE — 87045 FECES CULTURE AEROBIC BACT: CPT | Performed by: INTERNAL MEDICINE

## 2018-01-01 PROCEDURE — 0B968ZZ DRAINAGE OF RIGHT LOWER LOBE BRONCHUS, VIA NATURAL OR ARTIFICIAL OPENING ENDOSCOPIC: ICD-10-PCS | Performed by: INTERNAL MEDICINE

## 2018-01-01 PROCEDURE — G8979 MOBILITY GOAL STATUS: HCPCS

## 2018-01-01 PROCEDURE — 85025 COMPLETE CBC W/AUTO DIFF WBC: CPT | Performed by: EMERGENCY MEDICINE

## 2018-01-01 PROCEDURE — 80202 ASSAY OF VANCOMYCIN: CPT | Performed by: INTERNAL MEDICINE

## 2018-01-01 PROCEDURE — 74011250636 HC RX REV CODE- 250/636: Performed by: HOSPITALIST

## 2018-01-01 PROCEDURE — 97116 GAIT TRAINING THERAPY: CPT | Performed by: PHYSICAL THERAPIST

## 2018-01-01 PROCEDURE — 94660 CPAP INITIATION&MGMT: CPT

## 2018-01-01 PROCEDURE — 86923 COMPATIBILITY TEST ELECTRIC: CPT | Performed by: NURSE PRACTITIONER

## 2018-01-01 PROCEDURE — 84484 ASSAY OF TROPONIN QUANT: CPT | Performed by: NURSE PRACTITIONER

## 2018-01-01 PROCEDURE — 30233N1 TRANSFUSION OF NONAUTOLOGOUS RED BLOOD CELLS INTO PERIPHERAL VEIN, PERCUTANEOUS APPROACH: ICD-10-PCS | Performed by: INTERNAL MEDICINE

## 2018-01-01 PROCEDURE — 74011250637 HC RX REV CODE- 250/637: Performed by: EMERGENCY MEDICINE

## 2018-01-01 PROCEDURE — 76450000000

## 2018-01-01 PROCEDURE — 71250 CT THORAX DX C-: CPT

## 2018-01-01 PROCEDURE — 96366 THER/PROPH/DIAG IV INF ADDON: CPT

## 2018-01-01 RX ORDER — MICONAZOLE NITRATE 20 MG/G
CREAM TOPICAL 2 TIMES DAILY
Status: DISCONTINUED | OUTPATIENT
Start: 2018-01-01 | End: 2018-01-01

## 2018-01-01 RX ORDER — HALOPERIDOL 5 MG/ML
2 INJECTION INTRAMUSCULAR
Status: DISCONTINUED | OUTPATIENT
Start: 2018-01-01 | End: 2018-01-01

## 2018-01-01 RX ORDER — VANCOMYCIN/0.9 % SOD CHLORIDE 1 G/100 ML
1000 PLASTIC BAG, INJECTION (ML) INTRAVENOUS ONCE
Status: COMPLETED | OUTPATIENT
Start: 2018-01-01 | End: 2018-01-01

## 2018-01-01 RX ORDER — METOPROLOL SUCCINATE 25 MG/1
12.5 TABLET, EXTENDED RELEASE ORAL 2 TIMES DAILY
Status: DISCONTINUED | OUTPATIENT
Start: 2018-01-01 | End: 2018-01-01

## 2018-01-01 RX ORDER — PREDNISONE 20 MG/1
40 TABLET ORAL
Status: DISCONTINUED | OUTPATIENT
Start: 2018-01-01 | End: 2018-01-01

## 2018-01-01 RX ORDER — CLONAZEPAM 0.5 MG/1
0.5 TABLET ORAL 4 TIMES DAILY
Status: DISCONTINUED | OUTPATIENT
Start: 2018-01-01 | End: 2018-01-01

## 2018-01-01 RX ORDER — ONDANSETRON 2 MG/ML
4 INJECTION INTRAMUSCULAR; INTRAVENOUS
Status: DISCONTINUED | OUTPATIENT
Start: 2018-01-01 | End: 2018-01-01

## 2018-01-01 RX ORDER — DILTIAZEM HYDROCHLORIDE 5 MG/ML
15 INJECTION INTRAVENOUS ONCE
Status: COMPLETED | OUTPATIENT
Start: 2018-01-01 | End: 2018-01-01

## 2018-01-01 RX ORDER — ACETAMINOPHEN 325 MG/1
650 TABLET ORAL
Status: DISCONTINUED | OUTPATIENT
Start: 2018-01-01 | End: 2018-01-01

## 2018-01-01 RX ORDER — IPRATROPIUM BROMIDE 0.5 MG/2.5ML
0.5 SOLUTION RESPIRATORY (INHALATION)
Status: DISCONTINUED | OUTPATIENT
Start: 2018-01-01 | End: 2018-01-01

## 2018-01-01 RX ORDER — METRONIDAZOLE 500 MG/100ML
500 INJECTION, SOLUTION INTRAVENOUS EVERY 12 HOURS
Status: DISCONTINUED | OUTPATIENT
Start: 2018-01-01 | End: 2018-01-01

## 2018-01-01 RX ORDER — GUAIFENESIN 600 MG/1
600 TABLET, EXTENDED RELEASE ORAL EVERY 12 HOURS
Status: DISCONTINUED | OUTPATIENT
Start: 2018-01-01 | End: 2018-01-01

## 2018-01-01 RX ORDER — MAGNESIUM SULFATE HEPTAHYDRATE 40 MG/ML
2 INJECTION, SOLUTION INTRAVENOUS ONCE
Status: COMPLETED | OUTPATIENT
Start: 2018-01-01 | End: 2018-01-01

## 2018-01-01 RX ORDER — POTASSIUM CHLORIDE 14.9 MG/ML
10 INJECTION INTRAVENOUS
Status: COMPLETED | OUTPATIENT
Start: 2018-01-01 | End: 2018-01-01

## 2018-01-01 RX ORDER — HEPARIN SODIUM 5000 [USP'U]/ML
5000 INJECTION, SOLUTION INTRAVENOUS; SUBCUTANEOUS EVERY 8 HOURS
Status: DISCONTINUED | OUTPATIENT
Start: 2018-01-01 | End: 2018-01-01

## 2018-01-01 RX ORDER — CHLORHEXIDINE GLUCONATE 1.2 MG/ML
15 RINSE ORAL EVERY 12 HOURS
Status: DISCONTINUED | OUTPATIENT
Start: 2018-01-01 | End: 2018-01-01

## 2018-01-01 RX ORDER — LIDOCAINE HYDROCHLORIDE 20 MG/ML
5 SOLUTION OROPHARYNGEAL ONCE
Status: ACTIVE | OUTPATIENT
Start: 2018-01-01 | End: 2018-01-01

## 2018-01-01 RX ORDER — ACETAMINOPHEN 650 MG/1
650 SUPPOSITORY RECTAL
Status: DISCONTINUED | OUTPATIENT
Start: 2018-01-01 | End: 2018-03-03 | Stop reason: HOSPADM

## 2018-01-01 RX ORDER — MORPHINE SULFATE 4 MG/ML
2 INJECTION INTRAVENOUS
Status: DISCONTINUED | OUTPATIENT
Start: 2018-01-01 | End: 2018-03-03 | Stop reason: HOSPADM

## 2018-01-01 RX ORDER — PRAVASTATIN SODIUM 40 MG/1
80 TABLET ORAL DAILY
Status: DISCONTINUED | OUTPATIENT
Start: 2018-01-01 | End: 2018-01-01

## 2018-01-01 RX ORDER — PREDNISONE 20 MG/1
20 TABLET ORAL
Status: DISCONTINUED | OUTPATIENT
Start: 2018-01-01 | End: 2018-01-01

## 2018-01-01 RX ORDER — ONDANSETRON 2 MG/ML
4 INJECTION INTRAMUSCULAR; INTRAVENOUS
Status: DISCONTINUED | OUTPATIENT
Start: 2018-01-01 | End: 2018-01-01 | Stop reason: HOSPADM

## 2018-01-01 RX ORDER — DILTIAZEM HYDROCHLORIDE 5 MG/ML
10 INJECTION INTRAVENOUS ONCE
Status: COMPLETED | OUTPATIENT
Start: 2018-01-01 | End: 2018-01-01

## 2018-01-01 RX ORDER — DILTIAZEM HYDROCHLORIDE 5 MG/ML
20 INJECTION INTRAVENOUS ONCE
Status: COMPLETED | OUTPATIENT
Start: 2018-01-01 | End: 2018-01-01

## 2018-01-01 RX ORDER — ASPIRIN 325 MG
1 TABLET, DELAYED RELEASE (ENTERIC COATED) ORAL
Status: DISCONTINUED | OUTPATIENT
Start: 2018-01-01 | End: 2018-01-01

## 2018-01-01 RX ORDER — MIDAZOLAM HYDROCHLORIDE 1 MG/ML
.25-5 INJECTION, SOLUTION INTRAMUSCULAR; INTRAVENOUS
Status: DISCONTINUED | OUTPATIENT
Start: 2018-01-01 | End: 2018-01-01

## 2018-01-01 RX ORDER — PREDNISONE 10 MG/1
TABLET ORAL
Qty: 30 TAB | Refills: 0 | Status: SHIPPED | OUTPATIENT
Start: 2018-01-01

## 2018-01-01 RX ORDER — LIDOCAINE HYDROCHLORIDE 20 MG/ML
JELLY TOPICAL ONCE
Status: DISPENSED | OUTPATIENT
Start: 2018-01-01 | End: 2018-01-01

## 2018-01-01 RX ORDER — METOPROLOL SUCCINATE 25 MG/1
25 TABLET, EXTENDED RELEASE ORAL 2 TIMES DAILY
Status: DISCONTINUED | OUTPATIENT
Start: 2018-01-01 | End: 2018-01-01

## 2018-01-01 RX ORDER — SODIUM CHLORIDE 9 MG/ML
100 INJECTION, SOLUTION INTRAVENOUS CONTINUOUS
Status: DISCONTINUED | OUTPATIENT
Start: 2018-01-01 | End: 2018-01-01

## 2018-01-01 RX ORDER — SODIUM CHLORIDE 0.9 % (FLUSH) 0.9 %
5-10 SYRINGE (ML) INJECTION EVERY 8 HOURS
Status: DISCONTINUED | OUTPATIENT
Start: 2018-01-01 | End: 2018-01-01 | Stop reason: HOSPADM

## 2018-01-01 RX ORDER — IPRATROPIUM BROMIDE AND ALBUTEROL SULFATE 2.5; .5 MG/3ML; MG/3ML
3 SOLUTION RESPIRATORY (INHALATION)
Status: DISCONTINUED | OUTPATIENT
Start: 2018-01-01 | End: 2018-01-01 | Stop reason: HOSPADM

## 2018-01-01 RX ORDER — SCOLOPAMINE TRANSDERMAL SYSTEM 1 MG/1
1 PATCH, EXTENDED RELEASE TRANSDERMAL
Status: DISCONTINUED | OUTPATIENT
Start: 2018-01-01 | End: 2018-03-03 | Stop reason: HOSPADM

## 2018-01-01 RX ORDER — IPRATROPIUM BROMIDE AND ALBUTEROL SULFATE 2.5; .5 MG/3ML; MG/3ML
3 SOLUTION RESPIRATORY (INHALATION)
Status: DISCONTINUED | OUTPATIENT
Start: 2018-01-01 | End: 2018-01-01

## 2018-01-01 RX ORDER — METOPROLOL TARTRATE 5 MG/5ML
INJECTION INTRAVENOUS
Status: DISPENSED
Start: 2018-01-01 | End: 2018-01-01

## 2018-01-01 RX ORDER — FUROSEMIDE 10 MG/ML
20 INJECTION INTRAMUSCULAR; INTRAVENOUS ONCE
Status: COMPLETED | OUTPATIENT
Start: 2018-01-01 | End: 2018-01-01

## 2018-01-01 RX ORDER — SODIUM CHLORIDE 0.9 % (FLUSH) 0.9 %
5-10 SYRINGE (ML) INJECTION AS NEEDED
Status: DISCONTINUED | OUTPATIENT
Start: 2018-01-01 | End: 2018-01-01

## 2018-01-01 RX ORDER — METOPROLOL TARTRATE 25 MG/1
12.5 TABLET, FILM COATED ORAL 2 TIMES DAILY
Status: ON HOLD | COMMUNITY
End: 2018-01-01

## 2018-01-01 RX ORDER — MORPHINE SULFATE 10 MG/ML
2 INJECTION, SOLUTION INTRAMUSCULAR; INTRAVENOUS
Status: DISCONTINUED | OUTPATIENT
Start: 2018-01-01 | End: 2018-01-01 | Stop reason: HOSPADM

## 2018-01-01 RX ORDER — FACIAL-BODY WIPES
10 EACH TOPICAL DAILY PRN
Status: DISCONTINUED | OUTPATIENT
Start: 2018-01-01 | End: 2018-03-03 | Stop reason: HOSPADM

## 2018-01-01 RX ORDER — NITROGLYCERIN 0.4 MG/1
0.4 TABLET SUBLINGUAL AS NEEDED
Status: DISCONTINUED | OUTPATIENT
Start: 2018-01-01 | End: 2018-01-01

## 2018-01-01 RX ORDER — ALBUTEROL SULFATE 90 UG/1
2 AEROSOL, METERED RESPIRATORY (INHALATION)
Qty: 1 INHALER | Refills: 0 | Status: SHIPPED | OUTPATIENT
Start: 2018-01-01

## 2018-01-01 RX ORDER — ROPINIROLE 0.25 MG/1
0.25 TABLET, FILM COATED ORAL
Status: DISCONTINUED | OUTPATIENT
Start: 2018-01-01 | End: 2018-01-01

## 2018-01-01 RX ORDER — LIDOCAINE HYDROCHLORIDE 20 MG/ML
1-20 INJECTION, SOLUTION INFILTRATION; PERINEURAL ONCE
Status: ACTIVE | OUTPATIENT
Start: 2018-01-01 | End: 2018-01-01

## 2018-01-01 RX ORDER — POTASSIUM CHLORIDE 1.5 G/1.77G
20 POWDER, FOR SOLUTION ORAL 2 TIMES DAILY WITH MEALS
Status: DISPENSED | OUTPATIENT
Start: 2018-01-01 | End: 2018-01-01

## 2018-01-01 RX ORDER — OXYCODONE HYDROCHLORIDE 10 MG/1
10 TABLET ORAL
COMMUNITY
End: 2018-01-01

## 2018-01-01 RX ORDER — SODIUM CHLORIDE 9 MG/ML
250 INJECTION, SOLUTION INTRAVENOUS AS NEEDED
Status: DISCONTINUED | OUTPATIENT
Start: 2018-01-01 | End: 2018-01-01

## 2018-01-01 RX ORDER — SODIUM BICARBONATE 1 MEQ/ML
100 SYRINGE (ML) INTRAVENOUS ONCE
Status: COMPLETED | OUTPATIENT
Start: 2018-01-01 | End: 2018-01-01

## 2018-01-01 RX ORDER — LEVALBUTEROL INHALATION SOLUTION 1.25 MG/3ML
1.25 SOLUTION RESPIRATORY (INHALATION)
Status: DISCONTINUED | OUTPATIENT
Start: 2018-01-01 | End: 2018-01-01

## 2018-01-01 RX ORDER — SODIUM BICARBONATE 1 MEQ/ML
SYRINGE (ML) INTRAVENOUS
Status: DISCONTINUED
Start: 2018-01-01 | End: 2018-01-01

## 2018-01-01 RX ORDER — GUAIFENESIN 100 MG/5ML
81 LIQUID (ML) ORAL DAILY
Status: DISCONTINUED | OUTPATIENT
Start: 2018-01-01 | End: 2018-01-01

## 2018-01-01 RX ORDER — GUAIFENESIN 100 MG/5ML
100 SOLUTION ORAL
Status: DISCONTINUED | OUTPATIENT
Start: 2018-01-01 | End: 2018-01-01

## 2018-01-01 RX ORDER — AZITHROMYCIN 250 MG/1
250 TABLET, FILM COATED ORAL DAILY
Qty: 3 TAB | Refills: 0 | Status: SHIPPED | OUTPATIENT
Start: 2018-01-01 | End: 2018-01-01

## 2018-01-01 RX ORDER — POTASSIUM CHLORIDE 1.5 G/1.77G
20 POWDER, FOR SOLUTION ORAL 2 TIMES DAILY WITH MEALS
Status: COMPLETED | OUTPATIENT
Start: 2018-01-01 | End: 2018-01-01

## 2018-01-01 RX ORDER — METRONIDAZOLE 500 MG/100ML
500 INJECTION, SOLUTION INTRAVENOUS EVERY 8 HOURS
Status: DISCONTINUED | OUTPATIENT
Start: 2018-01-01 | End: 2018-01-01

## 2018-01-01 RX ORDER — METOPROLOL SUCCINATE 50 MG/1
50 TABLET, EXTENDED RELEASE ORAL DAILY
Status: DISCONTINUED | OUTPATIENT
Start: 2018-01-01 | End: 2018-01-01

## 2018-01-01 RX ORDER — NALOXONE HYDROCHLORIDE 0.4 MG/ML
0.4 INJECTION, SOLUTION INTRAMUSCULAR; INTRAVENOUS; SUBCUTANEOUS
Status: DISCONTINUED | OUTPATIENT
Start: 2018-01-01 | End: 2018-01-01 | Stop reason: ALTCHOICE

## 2018-01-01 RX ORDER — MORPHINE SULFATE 2 MG/ML
2 INJECTION, SOLUTION INTRAMUSCULAR; INTRAVENOUS
Status: DISCONTINUED | OUTPATIENT
Start: 2018-01-01 | End: 2018-01-01

## 2018-01-01 RX ORDER — LORAZEPAM 2 MG/ML
1 INJECTION INTRAMUSCULAR EVERY 4 HOURS
Status: DISCONTINUED | OUTPATIENT
Start: 2018-01-01 | End: 2018-03-03 | Stop reason: HOSPADM

## 2018-01-01 RX ORDER — FENTANYL CITRATE 50 UG/ML
50 INJECTION, SOLUTION INTRAMUSCULAR; INTRAVENOUS
Status: DISPENSED | OUTPATIENT
Start: 2018-01-01 | End: 2018-01-01

## 2018-01-01 RX ORDER — FLUTICASONE FUROATE AND VILANTEROL 100; 25 UG/1; UG/1
1 POWDER RESPIRATORY (INHALATION) DAILY
Status: DISCONTINUED | OUTPATIENT
Start: 2018-01-01 | End: 2018-01-01

## 2018-01-01 RX ORDER — MORPHINE SULFATE 4 MG/ML
2 INJECTION INTRAVENOUS EVERY 4 HOURS
Status: DISCONTINUED | OUTPATIENT
Start: 2018-01-01 | End: 2018-03-03 | Stop reason: HOSPADM

## 2018-01-01 RX ORDER — SODIUM CHLORIDE 0.9 % (FLUSH) 0.9 %
SYRINGE (ML) INJECTION
Status: COMPLETED
Start: 2018-01-01 | End: 2018-01-01

## 2018-01-01 RX ORDER — HYDROCODONE BITARTRATE AND ACETAMINOPHEN 5; 325 MG/1; MG/1
1 TABLET ORAL
Status: DISCONTINUED | OUTPATIENT
Start: 2018-01-01 | End: 2018-01-01

## 2018-01-01 RX ORDER — PREDNISONE 10 MG/1
10 TABLET ORAL
Status: DISCONTINUED | OUTPATIENT
Start: 2018-01-01 | End: 2018-01-01

## 2018-01-01 RX ORDER — GLYCOPYRROLATE 0.2 MG/ML
0.2 INJECTION INTRAMUSCULAR; INTRAVENOUS EVERY 4 HOURS
Status: DISCONTINUED | OUTPATIENT
Start: 2018-01-01 | End: 2018-03-03 | Stop reason: HOSPADM

## 2018-01-01 RX ORDER — METOPROLOL TARTRATE 5 MG/5ML
2.5 INJECTION INTRAVENOUS ONCE
Status: COMPLETED | OUTPATIENT
Start: 2018-01-01 | End: 2018-01-01

## 2018-01-01 RX ORDER — SODIUM CHLORIDE 450 MG/100ML
50 INJECTION, SOLUTION INTRAVENOUS CONTINUOUS
Status: DISCONTINUED | OUTPATIENT
Start: 2018-01-01 | End: 2018-01-01

## 2018-01-01 RX ORDER — LORAZEPAM 2 MG/ML
1 INJECTION INTRAMUSCULAR
Status: DISCONTINUED | OUTPATIENT
Start: 2018-01-01 | End: 2018-03-03 | Stop reason: HOSPADM

## 2018-01-01 RX ORDER — GLYCOPYRROLATE 0.2 MG/ML
0.2 INJECTION INTRAMUSCULAR; INTRAVENOUS
Status: DISCONTINUED | OUTPATIENT
Start: 2018-01-01 | End: 2018-01-01 | Stop reason: HOSPADM

## 2018-01-01 RX ORDER — MORPHINE SULFATE 10 MG/ML
1 INJECTION, SOLUTION INTRAMUSCULAR; INTRAVENOUS ONCE
Status: COMPLETED | OUTPATIENT
Start: 2018-01-01 | End: 2018-01-01

## 2018-01-01 RX ORDER — FLUMAZENIL 0.1 MG/ML
0.2 INJECTION INTRAVENOUS
Status: DISCONTINUED | OUTPATIENT
Start: 2018-01-01 | End: 2018-01-01 | Stop reason: ALTCHOICE

## 2018-01-01 RX ORDER — METOPROLOL SUCCINATE 50 MG/1
100 TABLET, EXTENDED RELEASE ORAL DAILY
Status: DISCONTINUED | OUTPATIENT
Start: 2018-01-01 | End: 2018-01-01

## 2018-01-01 RX ORDER — FUROSEMIDE 10 MG/ML
20 INJECTION INTRAMUSCULAR; INTRAVENOUS
Status: COMPLETED | OUTPATIENT
Start: 2018-01-01 | End: 2018-01-01

## 2018-01-01 RX ORDER — DILTIAZEM HYDROCHLORIDE 5 MG/ML
INJECTION INTRAVENOUS
Status: COMPLETED
Start: 2018-01-01 | End: 2018-01-01

## 2018-01-01 RX ORDER — CLONAZEPAM 0.5 MG/1
0.5 TABLET ORAL
Status: DISCONTINUED | OUTPATIENT
Start: 2018-01-01 | End: 2018-01-01

## 2018-01-01 RX ORDER — SODIUM CHLORIDE 0.9 % (FLUSH) 0.9 %
5 SYRINGE (ML) INJECTION AS NEEDED
Status: DISCONTINUED | OUTPATIENT
Start: 2018-01-01 | End: 2018-03-03 | Stop reason: HOSPADM

## 2018-01-01 RX ORDER — HALOPERIDOL 2 MG/ML
2 SOLUTION ORAL
Status: DISCONTINUED | OUTPATIENT
Start: 2018-01-01 | End: 2018-01-01 | Stop reason: HOSPADM

## 2018-01-01 RX ORDER — DILTIAZEM HYDROCHLORIDE 5 MG/ML
INJECTION INTRAVENOUS
Status: DISPENSED
Start: 2018-01-01 | End: 2018-01-01

## 2018-01-01 RX ORDER — MUPIROCIN 20 MG/G
OINTMENT TOPICAL 2 TIMES DAILY
Status: DISCONTINUED | OUTPATIENT
Start: 2018-01-01 | End: 2018-01-01

## 2018-01-01 RX ORDER — IPRATROPIUM BROMIDE AND ALBUTEROL SULFATE 2.5; .5 MG/3ML; MG/3ML
3 SOLUTION RESPIRATORY (INHALATION)
Status: COMPLETED | OUTPATIENT
Start: 2018-01-01 | End: 2018-01-01

## 2018-01-01 RX ORDER — SCOLOPAMINE TRANSDERMAL SYSTEM 1 MG/1
1 PATCH, EXTENDED RELEASE TRANSDERMAL
Status: DISCONTINUED | OUTPATIENT
Start: 2018-01-01 | End: 2018-01-01 | Stop reason: HOSPADM

## 2018-01-01 RX ORDER — MORPHINE SULFATE 4 MG/ML
INJECTION INTRAVENOUS
Status: DISPENSED
Start: 2018-01-01 | End: 2018-01-01

## 2018-01-01 RX ORDER — DOCUSATE SODIUM 100 MG/1
100 CAPSULE, LIQUID FILLED ORAL
Status: DISCONTINUED | OUTPATIENT
Start: 2018-01-01 | End: 2018-01-01

## 2018-01-01 RX ORDER — LORAZEPAM 2 MG/ML
1 INJECTION INTRAMUSCULAR
Status: DISCONTINUED | OUTPATIENT
Start: 2018-01-01 | End: 2018-01-01 | Stop reason: HOSPADM

## 2018-01-01 RX ORDER — GUAIFENESIN 100 MG/5ML
81 LIQUID (ML) ORAL DAILY
Qty: 30 TAB | Refills: 0 | Status: SHIPPED | OUTPATIENT
Start: 2018-01-01

## 2018-01-01 RX ORDER — CLONAZEPAM 0.5 MG/1
0.5 TABLET ORAL AS NEEDED
Status: DISCONTINUED | OUTPATIENT
Start: 2018-01-01 | End: 2018-01-01

## 2018-01-01 RX ORDER — METOPROLOL TARTRATE 25 MG/1
25 TABLET, FILM COATED ORAL 2 TIMES DAILY
Qty: 60 TAB | Refills: 0 | Status: SHIPPED | OUTPATIENT
Start: 2018-01-01

## 2018-01-01 RX ORDER — IBUPROFEN 200 MG
1 TABLET ORAL DAILY
Status: DISCONTINUED | OUTPATIENT
Start: 2018-01-01 | End: 2018-01-01

## 2018-01-01 RX ORDER — CLONAZEPAM 0.5 MG/1
0.5 TABLET ORAL 2 TIMES DAILY
Status: DISCONTINUED | OUTPATIENT
Start: 2018-01-01 | End: 2018-01-01

## 2018-01-01 RX ORDER — NITROGLYCERIN 0.4 MG/1
TABLET SUBLINGUAL
Status: COMPLETED
Start: 2018-01-01 | End: 2018-01-01

## 2018-01-01 RX ORDER — LORAZEPAM 2 MG/ML
1 INJECTION INTRAMUSCULAR
Status: DISCONTINUED | OUTPATIENT
Start: 2018-01-01 | End: 2018-01-01

## 2018-01-01 RX ORDER — HALOPERIDOL 5 MG/ML
2 INJECTION INTRAMUSCULAR
Status: DISCONTINUED | OUTPATIENT
Start: 2018-01-01 | End: 2018-01-01 | Stop reason: HOSPADM

## 2018-01-01 RX ORDER — AZITHROMYCIN 250 MG/1
500 TABLET, FILM COATED ORAL DAILY
Status: DISCONTINUED | OUTPATIENT
Start: 2018-01-01 | End: 2018-01-01

## 2018-01-01 RX ORDER — MORPHINE SULFATE 4 MG/ML
2 INJECTION, SOLUTION INTRAMUSCULAR; INTRAVENOUS
Status: DISCONTINUED | OUTPATIENT
Start: 2018-01-01 | End: 2018-01-01 | Stop reason: SDUPTHER

## 2018-01-01 RX ADMIN — Medication 10 ML: at 22:40

## 2018-01-01 RX ADMIN — AZITHROMYCIN 500 MG: 250 TABLET, FILM COATED ORAL at 11:01

## 2018-01-01 RX ADMIN — Medication 10 ML: at 14:00

## 2018-01-01 RX ADMIN — ONDANSETRON HYDROCHLORIDE 4 MG: 2 INJECTION, SOLUTION INTRAMUSCULAR; INTRAVENOUS at 02:49

## 2018-01-01 RX ADMIN — ASPIRIN 81 MG 81 MG: 81 TABLET ORAL at 09:41

## 2018-01-01 RX ADMIN — METOPROLOL SUCCINATE 50 MG: 50 TABLET, EXTENDED RELEASE ORAL at 09:41

## 2018-01-01 RX ADMIN — PRAVASTATIN SODIUM 80 MG: 40 TABLET ORAL at 12:35

## 2018-01-01 RX ADMIN — LORAZEPAM 1 MG: 2 INJECTION INTRAMUSCULAR; INTRAVENOUS at 14:06

## 2018-01-01 RX ADMIN — HEPARIN SODIUM 5000 UNITS: 5000 INJECTION, SOLUTION INTRAVENOUS; SUBCUTANEOUS at 03:14

## 2018-01-01 RX ADMIN — METHYLPREDNISOLONE SODIUM SUCCINATE 40 MG: 40 INJECTION, POWDER, FOR SOLUTION INTRAMUSCULAR; INTRAVENOUS at 21:05

## 2018-01-01 RX ADMIN — METRONIDAZOLE 500 MG: 500 INJECTION, SOLUTION INTRAVENOUS at 17:45

## 2018-01-01 RX ADMIN — LORAZEPAM 1 MG: 2 INJECTION INTRAMUSCULAR; INTRAVENOUS at 02:07

## 2018-01-01 RX ADMIN — HEPARIN SODIUM 5000 UNITS: 5000 INJECTION, SOLUTION INTRAVENOUS; SUBCUTANEOUS at 17:48

## 2018-01-01 RX ADMIN — GLYCOPYRROLATE 0.2 MG: 0.2 INJECTION, SOLUTION INTRAMUSCULAR; INTRAVENOUS at 17:32

## 2018-01-01 RX ADMIN — GUAIFENESIN 600 MG: 600 TABLET, EXTENDED RELEASE ORAL at 21:52

## 2018-01-01 RX ADMIN — Medication 1 CAPSULE: at 09:42

## 2018-01-01 RX ADMIN — IPRATROPIUM BROMIDE 0.5 MG: 0.5 SOLUTION RESPIRATORY (INHALATION) at 20:38

## 2018-01-01 RX ADMIN — Medication 10 ML: at 17:43

## 2018-01-01 RX ADMIN — POTASSIUM CHLORIDE 20 MEQ: 1.5 POWDER, FOR SOLUTION ORAL at 09:42

## 2018-01-01 RX ADMIN — LORAZEPAM 1 MG: 2 INJECTION INTRAMUSCULAR; INTRAVENOUS at 07:15

## 2018-01-01 RX ADMIN — METHYLPREDNISOLONE SODIUM SUCCINATE 40 MG: 40 INJECTION, POWDER, FOR SOLUTION INTRAMUSCULAR; INTRAVENOUS at 14:32

## 2018-01-01 RX ADMIN — PRAVASTATIN SODIUM 80 MG: 40 TABLET ORAL at 09:42

## 2018-01-01 RX ADMIN — GUAIFENESIN 600 MG: 600 TABLET, EXTENDED RELEASE ORAL at 09:39

## 2018-01-01 RX ADMIN — PRAVASTATIN SODIUM 80 MG: 40 TABLET ORAL at 09:39

## 2018-01-01 RX ADMIN — MAGNESIUM SULFATE HEPTAHYDRATE 2 G: 40 INJECTION, SOLUTION INTRAVENOUS at 11:58

## 2018-01-01 RX ADMIN — LEVALBUTEROL HYDROCHLORIDE 1.25 MG: 1.25 SOLUTION RESPIRATORY (INHALATION) at 15:08

## 2018-01-01 RX ADMIN — METRONIDAZOLE 500 MG: 500 INJECTION, SOLUTION INTRAVENOUS at 05:19

## 2018-01-01 RX ADMIN — Medication 10 ML: at 21:23

## 2018-01-01 RX ADMIN — GLYCOPYRROLATE 0.2 MG: 0.2 INJECTION, SOLUTION INTRAMUSCULAR; INTRAVENOUS at 14:14

## 2018-01-01 RX ADMIN — HEPARIN SODIUM 5000 UNITS: 5000 INJECTION, SOLUTION INTRAVENOUS; SUBCUTANEOUS at 00:33

## 2018-01-01 RX ADMIN — ONDANSETRON 4 MG: 2 INJECTION INTRAMUSCULAR; INTRAVENOUS at 09:05

## 2018-01-01 RX ADMIN — Medication 10 ML: at 06:03

## 2018-01-01 RX ADMIN — CEFEPIME 2 G: 2 INJECTION, POWDER, FOR SOLUTION INTRAMUSCULAR; INTRAVENOUS at 18:42

## 2018-01-01 RX ADMIN — IPRATROPIUM BROMIDE 0.5 MG: 0.5 SOLUTION RESPIRATORY (INHALATION) at 08:28

## 2018-01-01 RX ADMIN — IPRATROPIUM BROMIDE 0.5 MG: 0.5 SOLUTION RESPIRATORY (INHALATION) at 20:06

## 2018-01-01 RX ADMIN — LORAZEPAM 1 MG: 2 INJECTION INTRAMUSCULAR; INTRAVENOUS at 06:02

## 2018-01-01 RX ADMIN — METHYLPREDNISOLONE SODIUM SUCCINATE 40 MG: 40 INJECTION, POWDER, FOR SOLUTION INTRAMUSCULAR; INTRAVENOUS at 08:45

## 2018-01-01 RX ADMIN — IPRATROPIUM BROMIDE AND ALBUTEROL SULFATE 3 ML: .5; 3 SOLUTION RESPIRATORY (INHALATION) at 07:35

## 2018-01-01 RX ADMIN — LEVALBUTEROL HYDROCHLORIDE 1.25 MG: 1.25 SOLUTION RESPIRATORY (INHALATION) at 03:55

## 2018-01-01 RX ADMIN — METHYLPREDNISOLONE SODIUM SUCCINATE 40 MG: 40 INJECTION, POWDER, FOR SOLUTION INTRAMUSCULAR; INTRAVENOUS at 21:06

## 2018-01-01 RX ADMIN — MORPHINE SULFATE 2 MG: 4 INJECTION INTRAVENOUS at 14:06

## 2018-01-01 RX ADMIN — METHYLPREDNISOLONE SODIUM SUCCINATE 40 MG: 40 INJECTION, POWDER, FOR SOLUTION INTRAMUSCULAR; INTRAVENOUS at 05:03

## 2018-01-01 RX ADMIN — METHYLPREDNISOLONE SODIUM SUCCINATE 40 MG: 40 INJECTION, POWDER, FOR SOLUTION INTRAMUSCULAR; INTRAVENOUS at 21:22

## 2018-01-01 RX ADMIN — PRAVASTATIN SODIUM 80 MG: 40 TABLET ORAL at 08:14

## 2018-01-01 RX ADMIN — CEFEPIME 2 G: 2 INJECTION, POWDER, FOR SOLUTION INTRAMUSCULAR; INTRAVENOUS at 06:21

## 2018-01-01 RX ADMIN — LORAZEPAM 1 MG: 2 INJECTION INTRAMUSCULAR; INTRAVENOUS at 17:31

## 2018-01-01 RX ADMIN — IPRATROPIUM BROMIDE AND ALBUTEROL SULFATE 3 ML: .5; 3 SOLUTION RESPIRATORY (INHALATION) at 13:46

## 2018-01-01 RX ADMIN — ROPINIROLE HYDROCHLORIDE 0.25 MG: 0.25 TABLET, FILM COATED ORAL at 19:54

## 2018-01-01 RX ADMIN — Medication 10 ML: at 21:11

## 2018-01-01 RX ADMIN — Medication 10 ML: at 04:21

## 2018-01-01 RX ADMIN — PRAVASTATIN SODIUM 80 MG: 40 TABLET ORAL at 10:01

## 2018-01-01 RX ADMIN — METHYLPREDNISOLONE SODIUM SUCCINATE 40 MG: 40 INJECTION, POWDER, FOR SOLUTION INTRAMUSCULAR; INTRAVENOUS at 17:01

## 2018-01-01 RX ADMIN — GUAIFENESIN 600 MG: 600 TABLET, EXTENDED RELEASE ORAL at 21:05

## 2018-01-01 RX ADMIN — HEPARIN SODIUM 5000 UNITS: 5000 INJECTION, SOLUTION INTRAVENOUS; SUBCUTANEOUS at 00:38

## 2018-01-01 RX ADMIN — METRONIDAZOLE 500 MG: 500 INJECTION, SOLUTION INTRAVENOUS at 03:59

## 2018-01-01 RX ADMIN — Medication 10 ML: at 17:46

## 2018-01-01 RX ADMIN — GLYCOPYRROLATE 0.2 MG: 0.2 INJECTION, SOLUTION INTRAMUSCULAR; INTRAVENOUS at 10:16

## 2018-01-01 RX ADMIN — CEFEPIME 2 G: 2 INJECTION, POWDER, FOR SOLUTION INTRAMUSCULAR; INTRAVENOUS at 17:01

## 2018-01-01 RX ADMIN — GUAIFENESIN 600 MG: 600 TABLET, EXTENDED RELEASE ORAL at 09:41

## 2018-01-01 RX ADMIN — LEVALBUTEROL HYDROCHLORIDE 1.25 MG: 1.25 SOLUTION RESPIRATORY (INHALATION) at 01:40

## 2018-01-01 RX ADMIN — LEVALBUTEROL HYDROCHLORIDE 1.25 MG: 1.25 SOLUTION RESPIRATORY (INHALATION) at 01:36

## 2018-01-01 RX ADMIN — Medication 10 ML: at 21:05

## 2018-01-01 RX ADMIN — SODIUM CHLORIDE 1000 ML: 900 INJECTION, SOLUTION INTRAVENOUS at 07:07

## 2018-01-01 RX ADMIN — ACETAMINOPHEN 650 MG: 325 TABLET ORAL at 04:16

## 2018-01-01 RX ADMIN — LEVALBUTEROL HYDROCHLORIDE 1.25 MG: 1.25 SOLUTION RESPIRATORY (INHALATION) at 08:39

## 2018-01-01 RX ADMIN — POTASSIUM CHLORIDE 10 MEQ: 200 INJECTION, SOLUTION INTRAVENOUS at 11:27

## 2018-01-01 RX ADMIN — LEVALBUTEROL HYDROCHLORIDE 1.25 MG: 1.25 SOLUTION RESPIRATORY (INHALATION) at 06:32

## 2018-01-01 RX ADMIN — METHYLPREDNISOLONE SODIUM SUCCINATE 40 MG: 40 INJECTION, POWDER, FOR SOLUTION INTRAMUSCULAR; INTRAVENOUS at 22:12

## 2018-01-01 RX ADMIN — AZITHROMYCIN 500 MG: 500 INJECTION, POWDER, LYOPHILIZED, FOR SOLUTION INTRAVENOUS at 17:47

## 2018-01-01 RX ADMIN — LEVALBUTEROL HYDROCHLORIDE 1.25 MG: 1.25 SOLUTION RESPIRATORY (INHALATION) at 07:46

## 2018-01-01 RX ADMIN — IPRATROPIUM BROMIDE AND ALBUTEROL SULFATE 3 ML: .5; 3 SOLUTION RESPIRATORY (INHALATION) at 11:52

## 2018-01-01 RX ADMIN — MORPHINE SULFATE 2 MG: 4 INJECTION INTRAVENOUS at 10:16

## 2018-01-01 RX ADMIN — HEPARIN SODIUM 5000 UNITS: 5000 INJECTION, SOLUTION INTRAVENOUS; SUBCUTANEOUS at 18:26

## 2018-01-01 RX ADMIN — METOPROLOL SUCCINATE 12.5 MG: 25 TABLET, EXTENDED RELEASE ORAL at 17:47

## 2018-01-01 RX ADMIN — CLOTRIMAZOLE 1 APPLICATOR: 1 CREAM VAGINAL at 13:35

## 2018-01-01 RX ADMIN — HALOPERIDOL LACTATE 2 MG: 5 INJECTION, SOLUTION INTRAMUSCULAR at 01:50

## 2018-01-01 RX ADMIN — IPRATROPIUM BROMIDE 0.5 MG: 0.5 SOLUTION RESPIRATORY (INHALATION) at 02:01

## 2018-01-01 RX ADMIN — Medication 10 ML: at 06:00

## 2018-01-01 RX ADMIN — IPRATROPIUM BROMIDE 0.5 MG: 0.5 SOLUTION RESPIRATORY (INHALATION) at 19:51

## 2018-01-01 RX ADMIN — HEPARIN SODIUM 5000 UNITS: 5000 INJECTION, SOLUTION INTRAVENOUS; SUBCUTANEOUS at 01:06

## 2018-01-01 RX ADMIN — ACETAMINOPHEN 650 MG: 325 TABLET ORAL at 18:25

## 2018-01-01 RX ADMIN — METRONIDAZOLE 500 MG: 500 INJECTION, SOLUTION INTRAVENOUS at 05:07

## 2018-01-01 RX ADMIN — MIDAZOLAM HYDROCHLORIDE 2 MG: 1 INJECTION INTRAMUSCULAR; INTRAVENOUS at 18:13

## 2018-01-01 RX ADMIN — Medication 10 ML: at 22:11

## 2018-01-01 RX ADMIN — CHLORHEXIDINE GLUCONATE 15 ML: 1.2 RINSE ORAL at 09:43

## 2018-01-01 RX ADMIN — NITROGLYCERIN 0.4 MG: 0.4 TABLET SUBLINGUAL at 12:35

## 2018-01-01 RX ADMIN — SODIUM CHLORIDE 50 ML/HR: 450 INJECTION, SOLUTION INTRAVENOUS at 09:19

## 2018-01-01 RX ADMIN — CHLORHEXIDINE GLUCONATE 15 ML: 1.2 RINSE ORAL at 20:00

## 2018-01-01 RX ADMIN — LORAZEPAM 1 MG: 2 INJECTION INTRAMUSCULAR; INTRAVENOUS at 23:06

## 2018-01-01 RX ADMIN — FUROSEMIDE 20 MG: 10 INJECTION, SOLUTION INTRAMUSCULAR; INTRAVENOUS at 08:52

## 2018-01-01 RX ADMIN — ONDANSETRON HYDROCHLORIDE 4 MG: 2 INJECTION, SOLUTION INTRAMUSCULAR; INTRAVENOUS at 07:51

## 2018-01-01 RX ADMIN — HYDROCODONE BITARTRATE AND ACETAMINOPHEN 1 TABLET: 5; 325 TABLET ORAL at 09:39

## 2018-01-01 RX ADMIN — CEFEPIME 2 G: 2 INJECTION, POWDER, FOR SOLUTION INTRAMUSCULAR; INTRAVENOUS at 05:16

## 2018-01-01 RX ADMIN — METHYLPREDNISOLONE SODIUM SUCCINATE 40 MG: 40 INJECTION, POWDER, FOR SOLUTION INTRAMUSCULAR; INTRAVENOUS at 13:43

## 2018-01-01 RX ADMIN — HEPARIN SODIUM 5000 UNITS: 5000 INJECTION, SOLUTION INTRAVENOUS; SUBCUTANEOUS at 08:50

## 2018-01-01 RX ADMIN — IPRATROPIUM BROMIDE 0.5 MG: 0.5 SOLUTION RESPIRATORY (INHALATION) at 02:17

## 2018-01-01 RX ADMIN — METRONIDAZOLE 500 MG: 500 INJECTION, SOLUTION INTRAVENOUS at 05:17

## 2018-01-01 RX ADMIN — CEFEPIME 2 G: 2 INJECTION, POWDER, FOR SOLUTION INTRAMUSCULAR; INTRAVENOUS at 18:01

## 2018-01-01 RX ADMIN — METOPROLOL SUCCINATE 50 MG: 50 TABLET, EXTENDED RELEASE ORAL at 09:39

## 2018-01-01 RX ADMIN — GUAIFENESIN 600 MG: 600 TABLET, EXTENDED RELEASE ORAL at 21:21

## 2018-01-01 RX ADMIN — IPRATROPIUM BROMIDE 0.5 MG: 0.5 SOLUTION RESPIRATORY (INHALATION) at 14:42

## 2018-01-01 RX ADMIN — REGADENOSON 0.4 MG: 0.08 INJECTION, SOLUTION INTRAVENOUS at 11:33

## 2018-01-01 RX ADMIN — CEFEPIME 2 G: 2 INJECTION, POWDER, FOR SOLUTION INTRAMUSCULAR; INTRAVENOUS at 05:26

## 2018-01-01 RX ADMIN — ACETAMINOPHEN 650 MG: 325 TABLET ORAL at 17:17

## 2018-01-01 RX ADMIN — Medication 10 ML: at 21:06

## 2018-01-01 RX ADMIN — Medication 5 ML: at 18:27

## 2018-01-01 RX ADMIN — LEVALBUTEROL HYDROCHLORIDE 1.25 MG: 1.25 SOLUTION RESPIRATORY (INHALATION) at 15:11

## 2018-01-01 RX ADMIN — FENTANYL CITRATE 50 MCG: 50 INJECTION, SOLUTION INTRAMUSCULAR; INTRAVENOUS at 18:12

## 2018-01-01 RX ADMIN — CLONAZEPAM 0.5 MG: 0.5 TABLET ORAL at 18:26

## 2018-01-01 RX ADMIN — IPRATROPIUM BROMIDE AND ALBUTEROL SULFATE 3 ML: .5; 3 SOLUTION RESPIRATORY (INHALATION) at 19:18

## 2018-01-01 RX ADMIN — IPRATROPIUM BROMIDE 0.5 MG: 0.5 SOLUTION RESPIRATORY (INHALATION) at 06:32

## 2018-01-01 RX ADMIN — IPRATROPIUM BROMIDE 0.5 MG: 0.5 SOLUTION RESPIRATORY (INHALATION) at 20:33

## 2018-01-01 RX ADMIN — HALOPERIDOL LACTATE 2 MG: 5 INJECTION, SOLUTION INTRAMUSCULAR at 20:01

## 2018-01-01 RX ADMIN — HEPARIN SODIUM 5000 UNITS: 5000 INJECTION, SOLUTION INTRAVENOUS; SUBCUTANEOUS at 09:25

## 2018-01-01 RX ADMIN — GUAIFENESIN 600 MG: 600 TABLET, EXTENDED RELEASE ORAL at 22:40

## 2018-01-01 RX ADMIN — IPRATROPIUM BROMIDE AND ALBUTEROL SULFATE 3 ML: .5; 3 SOLUTION RESPIRATORY (INHALATION) at 15:02

## 2018-01-01 RX ADMIN — SODIUM CHLORIDE 500 ML: 900 INJECTION, SOLUTION INTRAVENOUS at 18:30

## 2018-01-01 RX ADMIN — CLONAZEPAM 0.5 MG: 0.5 TABLET ORAL at 21:22

## 2018-01-01 RX ADMIN — FLUTICASONE FUROATE AND VILANTEROL TRIFENATATE 1 PUFF: 100; 25 POWDER RESPIRATORY (INHALATION) at 12:23

## 2018-01-01 RX ADMIN — ONDANSETRON HYDROCHLORIDE 4 MG: 2 INJECTION, SOLUTION INTRAMUSCULAR; INTRAVENOUS at 01:48

## 2018-01-01 RX ADMIN — SODIUM CHLORIDE 40 MG: 9 INJECTION, SOLUTION INTRAMUSCULAR; INTRAVENOUS; SUBCUTANEOUS at 20:15

## 2018-01-01 RX ADMIN — METRONIDAZOLE 500 MG: 500 INJECTION, SOLUTION INTRAVENOUS at 21:05

## 2018-01-01 RX ADMIN — GUAIFENESIN 600 MG: 600 TABLET, EXTENDED RELEASE ORAL at 21:15

## 2018-01-01 RX ADMIN — HEPARIN SODIUM 5000 UNITS: 5000 INJECTION, SOLUTION INTRAVENOUS; SUBCUTANEOUS at 00:52

## 2018-01-01 RX ADMIN — MORPHINE SULFATE 2 MG: 4 INJECTION, SOLUTION INTRAMUSCULAR; INTRAVENOUS at 09:05

## 2018-01-01 RX ADMIN — IPRATROPIUM BROMIDE AND ALBUTEROL SULFATE 3 ML: .5; 3 SOLUTION RESPIRATORY (INHALATION) at 20:26

## 2018-01-01 RX ADMIN — ONDANSETRON HYDROCHLORIDE 4 MG: 2 INJECTION, SOLUTION INTRAMUSCULAR; INTRAVENOUS at 17:43

## 2018-01-01 RX ADMIN — METOPROLOL SUCCINATE 100 MG: 50 TABLET, EXTENDED RELEASE ORAL at 10:33

## 2018-01-01 RX ADMIN — LEVALBUTEROL HYDROCHLORIDE 1.25 MG: 1.25 SOLUTION RESPIRATORY (INHALATION) at 14:58

## 2018-01-01 RX ADMIN — HEPARIN SODIUM 5000 UNITS: 5000 INJECTION, SOLUTION INTRAVENOUS; SUBCUTANEOUS at 02:15

## 2018-01-01 RX ADMIN — DILTIAZEM HYDROCHLORIDE 15 MG: 5 INJECTION INTRAVENOUS at 23:57

## 2018-01-01 RX ADMIN — LEVALBUTEROL HYDROCHLORIDE 1.25 MG: 1.25 SOLUTION RESPIRATORY (INHALATION) at 15:50

## 2018-01-01 RX ADMIN — ASPIRIN 81 MG 81 MG: 81 TABLET ORAL at 10:01

## 2018-01-01 RX ADMIN — METHYLPREDNISOLONE SODIUM SUCCINATE 40 MG: 40 INJECTION, POWDER, FOR SOLUTION INTRAMUSCULAR; INTRAVENOUS at 11:02

## 2018-01-01 RX ADMIN — MORPHINE SULFATE 2 MG: 10 INJECTION INTRAVENOUS at 06:02

## 2018-01-01 RX ADMIN — LEVALBUTEROL HYDROCHLORIDE 1.25 MG: 1.25 SOLUTION RESPIRATORY (INHALATION) at 02:17

## 2018-01-01 RX ADMIN — LEVALBUTEROL HYDROCHLORIDE 1.25 MG: 1.25 SOLUTION RESPIRATORY (INHALATION) at 02:01

## 2018-01-01 RX ADMIN — LEVALBUTEROL HYDROCHLORIDE 1.25 MG: 1.25 SOLUTION RESPIRATORY (INHALATION) at 20:33

## 2018-01-01 RX ADMIN — IPRATROPIUM BROMIDE AND ALBUTEROL SULFATE 3 ML: .5; 3 SOLUTION RESPIRATORY (INHALATION) at 13:26

## 2018-01-01 RX ADMIN — MORPHINE SULFATE 2 MG: 10 INJECTION INTRAVENOUS at 02:04

## 2018-01-01 RX ADMIN — METHYLPREDNISOLONE SODIUM SUCCINATE 125 MG: 125 INJECTION, POWDER, FOR SOLUTION INTRAMUSCULAR; INTRAVENOUS at 11:26

## 2018-01-01 RX ADMIN — IPRATROPIUM BROMIDE AND ALBUTEROL SULFATE 3 ML: .5; 3 SOLUTION RESPIRATORY (INHALATION) at 15:13

## 2018-01-01 RX ADMIN — GLYCOPYRROLATE 0.2 MG: 0.2 INJECTION, SOLUTION INTRAMUSCULAR; INTRAVENOUS at 18:43

## 2018-01-01 RX ADMIN — POTASSIUM CHLORIDE 20 MEQ: 1.5 POWDER, FOR SOLUTION ORAL at 10:01

## 2018-01-01 RX ADMIN — SODIUM CHLORIDE 40 MG: 9 INJECTION, SOLUTION INTRAMUSCULAR; INTRAVENOUS; SUBCUTANEOUS at 19:58

## 2018-01-01 RX ADMIN — NITROGLYCERIN: 0.4 TABLET SUBLINGUAL at 12:30

## 2018-01-01 RX ADMIN — GUAIFENESIN 600 MG: 600 TABLET, EXTENDED RELEASE ORAL at 20:47

## 2018-01-01 RX ADMIN — METRONIDAZOLE 500 MG: 500 INJECTION, SOLUTION INTRAVENOUS at 12:27

## 2018-01-01 RX ADMIN — Medication 1 CAPSULE: at 08:14

## 2018-01-01 RX ADMIN — HEPARIN SODIUM 5000 UNITS: 5000 INJECTION, SOLUTION INTRAVENOUS; SUBCUTANEOUS at 10:33

## 2018-01-01 RX ADMIN — METRONIDAZOLE 500 MG: 500 INJECTION, SOLUTION INTRAVENOUS at 20:14

## 2018-01-01 RX ADMIN — CHLORHEXIDINE GLUCONATE 15 ML: 1.2 RINSE ORAL at 17:45

## 2018-01-01 RX ADMIN — IPRATROPIUM BROMIDE 0.5 MG: 0.5 SOLUTION RESPIRATORY (INHALATION) at 02:04

## 2018-01-01 RX ADMIN — METRONIDAZOLE 500 MG: 500 INJECTION, SOLUTION INTRAVENOUS at 21:48

## 2018-01-01 RX ADMIN — Medication 10 ML: at 05:27

## 2018-01-01 RX ADMIN — SODIUM CHLORIDE 1000 ML: 900 INJECTION, SOLUTION INTRAVENOUS at 06:06

## 2018-01-01 RX ADMIN — POTASSIUM CHLORIDE 20 MEQ: 1.5 POWDER, FOR SOLUTION ORAL at 17:46

## 2018-01-01 RX ADMIN — LEVALBUTEROL HYDROCHLORIDE 1.25 MG: 1.25 SOLUTION RESPIRATORY (INHALATION) at 02:04

## 2018-01-01 RX ADMIN — PRAVASTATIN SODIUM 80 MG: 40 TABLET ORAL at 10:59

## 2018-01-01 RX ADMIN — HALOPERIDOL LACTATE 2 MG: 5 INJECTION, SOLUTION INTRAMUSCULAR at 15:00

## 2018-01-01 RX ADMIN — GLYCOPYRROLATE 0.2 MG: 0.2 INJECTION, SOLUTION INTRAMUSCULAR; INTRAVENOUS at 06:00

## 2018-01-01 RX ADMIN — GUAIFENESIN 600 MG: 600 TABLET, EXTENDED RELEASE ORAL at 08:50

## 2018-01-01 RX ADMIN — SODIUM CHLORIDE 100 ML/HR: 900 INJECTION, SOLUTION INTRAVENOUS at 09:40

## 2018-01-01 RX ADMIN — ONDANSETRON HYDROCHLORIDE 4 MG: 2 INJECTION, SOLUTION INTRAMUSCULAR; INTRAVENOUS at 17:13

## 2018-01-01 RX ADMIN — CLONAZEPAM 0.5 MG: 0.5 TABLET ORAL at 10:26

## 2018-01-01 RX ADMIN — CEFEPIME 2 G: 2 INJECTION, POWDER, FOR SOLUTION INTRAMUSCULAR; INTRAVENOUS at 17:09

## 2018-01-01 RX ADMIN — ASPIRIN 81 MG 81 MG: 81 TABLET ORAL at 08:50

## 2018-01-01 RX ADMIN — NITROGLYCERIN 0.4 MG: 0.4 TABLET SUBLINGUAL at 12:40

## 2018-01-01 RX ADMIN — SODIUM BICARBONATE 100 MEQ: 84 INJECTION, SOLUTION INTRAVENOUS at 06:05

## 2018-01-01 RX ADMIN — CLONAZEPAM 0.5 MG: 0.5 TABLET ORAL at 08:50

## 2018-01-01 RX ADMIN — SODIUM CHLORIDE 0.2 MCG/KG/HR: 900 INJECTION, SOLUTION INTRAVENOUS at 13:38

## 2018-01-01 RX ADMIN — Medication 10 ML: at 11:31

## 2018-01-01 RX ADMIN — IPRATROPIUM BROMIDE AND ALBUTEROL SULFATE 3 ML: .5; 3 SOLUTION RESPIRATORY (INHALATION) at 11:42

## 2018-01-01 RX ADMIN — ACETAMINOPHEN 650 MG: 325 TABLET ORAL at 10:32

## 2018-01-01 RX ADMIN — CEFEPIME 2 G: 2 INJECTION, POWDER, FOR SOLUTION INTRAMUSCULAR; INTRAVENOUS at 18:04

## 2018-01-01 RX ADMIN — SODIUM CHLORIDE 500 ML: 900 INJECTION, SOLUTION INTRAVENOUS at 11:58

## 2018-01-01 RX ADMIN — CEFEPIME 2 G: 2 INJECTION, POWDER, FOR SOLUTION INTRAMUSCULAR; INTRAVENOUS at 17:42

## 2018-01-01 RX ADMIN — MORPHINE SULFATE 1 MG: 10 INJECTION INTRAMUSCULAR; INTRAVENOUS; SUBCUTANEOUS at 13:01

## 2018-01-01 RX ADMIN — Medication 10 ML: at 05:06

## 2018-01-01 RX ADMIN — LEVALBUTEROL HYDROCHLORIDE 1.25 MG: 1.25 SOLUTION RESPIRATORY (INHALATION) at 07:25

## 2018-01-01 RX ADMIN — MORPHINE SULFATE 2 MG: 4 INJECTION INTRAVENOUS at 14:14

## 2018-01-01 RX ADMIN — Medication 10 ML: at 05:07

## 2018-01-01 RX ADMIN — LORAZEPAM 1 MG: 2 INJECTION INTRAMUSCULAR; INTRAVENOUS at 18:43

## 2018-01-01 RX ADMIN — Medication 10 ML: at 22:12

## 2018-01-01 RX ADMIN — DILTIAZEM HYDROCHLORIDE 5 MG/HR: 5 INJECTION, SOLUTION INTRAVENOUS at 00:54

## 2018-01-01 RX ADMIN — CEFEPIME 2 G: 2 INJECTION, POWDER, FOR SOLUTION INTRAMUSCULAR; INTRAVENOUS at 07:46

## 2018-01-01 RX ADMIN — Medication 10 ML: at 17:02

## 2018-01-01 RX ADMIN — HYDROCODONE BITARTRATE AND ACETAMINOPHEN 1 TABLET: 5; 325 TABLET ORAL at 18:47

## 2018-01-01 RX ADMIN — METRONIDAZOLE 500 MG: 500 INJECTION, SOLUTION INTRAVENOUS at 05:03

## 2018-01-01 RX ADMIN — METOPROLOL SUCCINATE 25 MG: 25 TABLET, EXTENDED RELEASE ORAL at 17:43

## 2018-01-01 RX ADMIN — HEPARIN SODIUM 5000 UNITS: 5000 INJECTION, SOLUTION INTRAVENOUS; SUBCUTANEOUS at 08:17

## 2018-01-01 RX ADMIN — SODIUM CHLORIDE 100 ML/HR: 900 INJECTION, SOLUTION INTRAVENOUS at 19:02

## 2018-01-01 RX ADMIN — HEPARIN SODIUM 5000 UNITS: 5000 INJECTION, SOLUTION INTRAVENOUS; SUBCUTANEOUS at 10:02

## 2018-01-01 RX ADMIN — Medication 10 ML: at 21:48

## 2018-01-01 RX ADMIN — Medication 10 ML: at 13:35

## 2018-01-01 RX ADMIN — IPRATROPIUM BROMIDE AND ALBUTEROL SULFATE 3 ML: .5; 3 SOLUTION RESPIRATORY (INHALATION) at 00:20

## 2018-01-01 RX ADMIN — PRAVASTATIN SODIUM 80 MG: 40 TABLET ORAL at 08:50

## 2018-01-01 RX ADMIN — GUAIFENESIN 600 MG: 600 TABLET, EXTENDED RELEASE ORAL at 12:35

## 2018-01-01 RX ADMIN — HEPARIN SODIUM 5000 UNITS: 5000 INJECTION, SOLUTION INTRAVENOUS; SUBCUTANEOUS at 17:46

## 2018-01-01 RX ADMIN — GLYCOPYRROLATE 0.2 MG: 0.2 INJECTION, SOLUTION INTRAMUSCULAR; INTRAVENOUS at 22:00

## 2018-01-01 RX ADMIN — HALOPERIDOL LACTATE 2 MG: 5 INJECTION, SOLUTION INTRAMUSCULAR at 12:26

## 2018-01-01 RX ADMIN — Medication 10 ML: at 13:43

## 2018-01-01 RX ADMIN — GLYCOPYRROLATE 0.2 MG: 0.2 INJECTION, SOLUTION INTRAMUSCULAR; INTRAVENOUS at 06:02

## 2018-01-01 RX ADMIN — MUPIROCIN: 20 OINTMENT TOPICAL at 17:59

## 2018-01-01 RX ADMIN — CEFEPIME 2 G: 2 INJECTION, POWDER, FOR SOLUTION INTRAMUSCULAR; INTRAVENOUS at 17:13

## 2018-01-01 RX ADMIN — METRONIDAZOLE 500 MG: 500 INJECTION, SOLUTION INTRAVENOUS at 21:06

## 2018-01-01 RX ADMIN — IPRATROPIUM BROMIDE AND ALBUTEROL SULFATE 3 ML: .5; 3 SOLUTION RESPIRATORY (INHALATION) at 01:00

## 2018-01-01 RX ADMIN — LEVALBUTEROL HYDROCHLORIDE 1.25 MG: 1.25 SOLUTION RESPIRATORY (INHALATION) at 14:42

## 2018-01-01 RX ADMIN — IPRATROPIUM BROMIDE AND ALBUTEROL SULFATE 3 ML: .5; 3 SOLUTION RESPIRATORY (INHALATION) at 04:04

## 2018-01-01 RX ADMIN — HALOPERIDOL LACTATE 2 MG: 5 INJECTION, SOLUTION INTRAMUSCULAR at 09:50

## 2018-01-01 RX ADMIN — HEPARIN SODIUM 5000 UNITS: 5000 INJECTION, SOLUTION INTRAVENOUS; SUBCUTANEOUS at 17:23

## 2018-01-01 RX ADMIN — IPRATROPIUM BROMIDE AND ALBUTEROL SULFATE 3 ML: .5; 3 SOLUTION RESPIRATORY (INHALATION) at 17:47

## 2018-01-01 RX ADMIN — CHLORHEXIDINE GLUCONATE 15 ML: 1.2 RINSE ORAL at 21:00

## 2018-01-01 RX ADMIN — CEFEPIME 2 G: 2 INJECTION, POWDER, FOR SOLUTION INTRAMUSCULAR; INTRAVENOUS at 17:58

## 2018-01-01 RX ADMIN — CHLORHEXIDINE GLUCONATE 15 ML: 1.2 RINSE ORAL at 21:52

## 2018-01-01 RX ADMIN — METHYLPREDNISOLONE SODIUM SUCCINATE 40 MG: 40 INJECTION, POWDER, FOR SOLUTION INTRAMUSCULAR; INTRAVENOUS at 06:10

## 2018-01-01 RX ADMIN — HALOPERIDOL LACTATE 2 MG: 5 INJECTION, SOLUTION INTRAMUSCULAR at 09:58

## 2018-01-01 RX ADMIN — Medication 10 ML: at 23:05

## 2018-01-01 RX ADMIN — GUAIFENESIN 600 MG: 600 TABLET, EXTENDED RELEASE ORAL at 08:14

## 2018-01-01 RX ADMIN — IPRATROPIUM BROMIDE 0.5 MG: 0.5 SOLUTION RESPIRATORY (INHALATION) at 07:46

## 2018-01-01 RX ADMIN — MICONAZOLE NITRATE: 20 CREAM TOPICAL at 18:11

## 2018-01-01 RX ADMIN — PRAVASTATIN SODIUM 80 MG: 40 TABLET ORAL at 10:33

## 2018-01-01 RX ADMIN — LEVALBUTEROL HYDROCHLORIDE 1.25 MG: 1.25 SOLUTION RESPIRATORY (INHALATION) at 20:38

## 2018-01-01 RX ADMIN — MUPIROCIN: 20 OINTMENT TOPICAL at 17:10

## 2018-01-01 RX ADMIN — METRONIDAZOLE 500 MG: 500 INJECTION, SOLUTION INTRAVENOUS at 13:58

## 2018-01-01 RX ADMIN — ACETAMINOPHEN 650 MG: 325 TABLET ORAL at 03:03

## 2018-01-01 RX ADMIN — METHYLPREDNISOLONE SODIUM SUCCINATE 40 MG: 40 INJECTION, POWDER, FOR SOLUTION INTRAMUSCULAR; INTRAVENOUS at 05:20

## 2018-01-01 RX ADMIN — DILTIAZEM HYDROCHLORIDE 5 MG/HR: 5 INJECTION, SOLUTION INTRAVENOUS at 00:19

## 2018-01-01 RX ADMIN — METOPROLOL SUCCINATE 100 MG: 50 TABLET, EXTENDED RELEASE ORAL at 10:01

## 2018-01-01 RX ADMIN — LORAZEPAM 1 MG: 2 INJECTION INTRAMUSCULAR; INTRAVENOUS at 10:17

## 2018-01-01 RX ADMIN — SODIUM CHLORIDE 0.4 MCG/KG/HR: 900 INJECTION, SOLUTION INTRAVENOUS at 13:39

## 2018-01-01 RX ADMIN — MUPIROCIN: 20 OINTMENT TOPICAL at 08:46

## 2018-01-01 RX ADMIN — Medication 10 ML: at 20:06

## 2018-01-01 RX ADMIN — METHYLPREDNISOLONE SODIUM SUCCINATE 40 MG: 40 INJECTION, POWDER, FOR SOLUTION INTRAMUSCULAR; INTRAVENOUS at 12:18

## 2018-01-01 RX ADMIN — GLYCOPYRROLATE 0.2 MG: 0.2 INJECTION, SOLUTION INTRAMUSCULAR; INTRAVENOUS at 09:14

## 2018-01-01 RX ADMIN — GLYCOPYRROLATE 0.2 MG: 0.2 INJECTION, SOLUTION INTRAMUSCULAR; INTRAVENOUS at 14:06

## 2018-01-01 RX ADMIN — GLYCOPYRROLATE 0.2 MG: 0.2 INJECTION, SOLUTION INTRAMUSCULAR; INTRAVENOUS at 02:46

## 2018-01-01 RX ADMIN — VANCOMYCIN HYDROCHLORIDE 750 MG: 10 INJECTION, POWDER, LYOPHILIZED, FOR SOLUTION INTRAVENOUS at 03:15

## 2018-01-01 RX ADMIN — LEVALBUTEROL HYDROCHLORIDE 1.25 MG: 1.25 SOLUTION RESPIRATORY (INHALATION) at 19:51

## 2018-01-01 RX ADMIN — HALOPERIDOL LACTATE 2 MG: 5 INJECTION, SOLUTION INTRAMUSCULAR at 14:32

## 2018-01-01 RX ADMIN — IPRATROPIUM BROMIDE AND ALBUTEROL SULFATE 3 ML: .5; 3 SOLUTION RESPIRATORY (INHALATION) at 12:54

## 2018-01-01 RX ADMIN — Medication 10 ML: at 05:03

## 2018-01-01 RX ADMIN — FLUTICASONE FUROATE AND VILANTEROL TRIFENATATE 1 PUFF: 100; 25 POWDER RESPIRATORY (INHALATION) at 09:47

## 2018-01-01 RX ADMIN — METHYLPREDNISOLONE SODIUM SUCCINATE 40 MG: 40 INJECTION, POWDER, FOR SOLUTION INTRAMUSCULAR; INTRAVENOUS at 21:16

## 2018-01-01 RX ADMIN — CHLORHEXIDINE GLUCONATE 15 ML: 1.2 RINSE ORAL at 21:04

## 2018-01-01 RX ADMIN — METOPROLOL SUCCINATE 12.5 MG: 25 TABLET, EXTENDED RELEASE ORAL at 09:33

## 2018-01-01 RX ADMIN — ONDANSETRON HYDROCHLORIDE 4 MG: 2 INJECTION, SOLUTION INTRAMUSCULAR; INTRAVENOUS at 18:08

## 2018-01-01 RX ADMIN — IPRATROPIUM BROMIDE AND ALBUTEROL SULFATE 3 ML: .5; 3 SOLUTION RESPIRATORY (INHALATION) at 08:06

## 2018-01-01 RX ADMIN — MORPHINE SULFATE 2 MG: 10 INJECTION INTRAVENOUS at 20:01

## 2018-01-01 RX ADMIN — AZITHROMYCIN 500 MG: 500 INJECTION, POWDER, LYOPHILIZED, FOR SOLUTION INTRAVENOUS at 12:36

## 2018-01-01 RX ADMIN — HYDROCODONE BITARTRATE AND ACETAMINOPHEN 1 TABLET: 5; 325 TABLET ORAL at 15:57

## 2018-01-01 RX ADMIN — METOPROLOL SUCCINATE 25 MG: 25 TABLET, EXTENDED RELEASE ORAL at 18:27

## 2018-01-01 RX ADMIN — IPRATROPIUM BROMIDE AND ALBUTEROL SULFATE 3 ML: .5; 3 SOLUTION RESPIRATORY (INHALATION) at 08:17

## 2018-01-01 RX ADMIN — GUAIFENESIN 600 MG: 600 TABLET, EXTENDED RELEASE ORAL at 21:11

## 2018-01-01 RX ADMIN — CEFEPIME 2 G: 2 INJECTION, POWDER, FOR SOLUTION INTRAMUSCULAR; INTRAVENOUS at 04:27

## 2018-01-01 RX ADMIN — HEPARIN SODIUM 5000 UNITS: 5000 INJECTION, SOLUTION INTRAVENOUS; SUBCUTANEOUS at 16:38

## 2018-01-01 RX ADMIN — METRONIDAZOLE 500 MG: 500 INJECTION, SOLUTION INTRAVENOUS at 17:21

## 2018-01-01 RX ADMIN — GLYCOPYRROLATE 0.2 MG: 0.2 INJECTION, SOLUTION INTRAMUSCULAR; INTRAVENOUS at 11:43

## 2018-01-01 RX ADMIN — HEPARIN SODIUM 5000 UNITS: 5000 INJECTION, SOLUTION INTRAVENOUS; SUBCUTANEOUS at 18:32

## 2018-01-01 RX ADMIN — Medication 10 ML: at 17:23

## 2018-01-01 RX ADMIN — MORPHINE SULFATE 2 MG: 10 INJECTION INTRAVENOUS at 11:43

## 2018-01-01 RX ADMIN — LEVALBUTEROL HYDROCHLORIDE 1.25 MG: 1.25 SOLUTION RESPIRATORY (INHALATION) at 20:06

## 2018-01-01 RX ADMIN — Medication 10 ML: at 22:42

## 2018-01-01 RX ADMIN — HEPARIN SODIUM 5000 UNITS: 5000 INJECTION, SOLUTION INTRAVENOUS; SUBCUTANEOUS at 09:39

## 2018-01-01 RX ADMIN — FUROSEMIDE 20 MG: 10 INJECTION, SOLUTION INTRAMUSCULAR; INTRAVENOUS at 02:49

## 2018-01-01 RX ADMIN — METHYLPREDNISOLONE SODIUM SUCCINATE 40 MG: 40 INJECTION, POWDER, FOR SOLUTION INTRAMUSCULAR; INTRAVENOUS at 08:50

## 2018-01-01 RX ADMIN — CEFEPIME 2 G: 2 INJECTION, POWDER, FOR SOLUTION INTRAMUSCULAR; INTRAVENOUS at 06:07

## 2018-01-01 RX ADMIN — SODIUM CHLORIDE 0.8 MCG/KG/HR: 900 INJECTION, SOLUTION INTRAVENOUS at 03:42

## 2018-01-01 RX ADMIN — AZITHROMYCIN 500 MG: 250 TABLET, FILM COATED ORAL at 08:49

## 2018-01-01 RX ADMIN — IPRATROPIUM BROMIDE 0.5 MG: 0.5 SOLUTION RESPIRATORY (INHALATION) at 03:55

## 2018-01-01 RX ADMIN — METRONIDAZOLE 500 MG: 500 INJECTION, SOLUTION INTRAVENOUS at 13:43

## 2018-01-01 RX ADMIN — VANCOMYCIN HYDROCHLORIDE 750 MG: 10 INJECTION, POWDER, LYOPHILIZED, FOR SOLUTION INTRAVENOUS at 10:41

## 2018-01-01 RX ADMIN — CEFEPIME 2 G: 2 INJECTION, POWDER, FOR SOLUTION INTRAMUSCULAR; INTRAVENOUS at 04:21

## 2018-01-01 RX ADMIN — CEFEPIME 2 G: 2 INJECTION, POWDER, FOR SOLUTION INTRAMUSCULAR; INTRAVENOUS at 05:03

## 2018-01-01 RX ADMIN — Medication 100 MG: at 18:47

## 2018-01-01 RX ADMIN — DILTIAZEM HYDROCHLORIDE 10 MG: 5 INJECTION INTRAVENOUS at 09:19

## 2018-01-01 RX ADMIN — METHYLPREDNISOLONE SODIUM SUCCINATE 40 MG: 40 INJECTION, POWDER, FOR SOLUTION INTRAMUSCULAR; INTRAVENOUS at 21:11

## 2018-01-01 RX ADMIN — DILTIAZEM HYDROCHLORIDE 10 MG/HR: 5 INJECTION, SOLUTION INTRAVENOUS at 09:56

## 2018-01-01 RX ADMIN — Medication 100 MG: at 13:38

## 2018-01-01 RX ADMIN — LORAZEPAM 1 MG: 2 INJECTION INTRAMUSCULAR; INTRAVENOUS at 12:06

## 2018-01-01 RX ADMIN — METHYLPREDNISOLONE SODIUM SUCCINATE 40 MG: 40 INJECTION, POWDER, FOR SOLUTION INTRAMUSCULAR; INTRAVENOUS at 05:26

## 2018-01-01 RX ADMIN — Medication 5 ML: at 22:00

## 2018-01-01 RX ADMIN — MORPHINE SULFATE 2 MG: 4 INJECTION INTRAVENOUS at 07:14

## 2018-01-01 RX ADMIN — METOPROLOL SUCCINATE 50 MG: 50 TABLET, EXTENDED RELEASE ORAL at 12:35

## 2018-01-01 RX ADMIN — LEVALBUTEROL HYDROCHLORIDE 1.25 MG: 1.25 SOLUTION RESPIRATORY (INHALATION) at 02:21

## 2018-01-01 RX ADMIN — IPRATROPIUM BROMIDE AND ALBUTEROL SULFATE 3 ML: .5; 3 SOLUTION RESPIRATORY (INHALATION) at 08:18

## 2018-01-01 RX ADMIN — CEFEPIME 2 G: 2 INJECTION, POWDER, FOR SOLUTION INTRAMUSCULAR; INTRAVENOUS at 17:23

## 2018-01-01 RX ADMIN — IPRATROPIUM BROMIDE AND ALBUTEROL SULFATE 3 ML: .5; 3 SOLUTION RESPIRATORY (INHALATION) at 20:47

## 2018-01-01 RX ADMIN — HEPARIN SODIUM 5000 UNITS: 5000 INJECTION, SOLUTION INTRAVENOUS; SUBCUTANEOUS at 17:17

## 2018-01-01 RX ADMIN — LEVALBUTEROL HYDROCHLORIDE 1.25 MG: 1.25 SOLUTION RESPIRATORY (INHALATION) at 12:15

## 2018-01-01 RX ADMIN — GUAIFENESIN 600 MG: 600 TABLET, EXTENDED RELEASE ORAL at 22:12

## 2018-01-01 RX ADMIN — IPRATROPIUM BROMIDE 0.5 MG: 0.5 SOLUTION RESPIRATORY (INHALATION) at 21:40

## 2018-01-01 RX ADMIN — LORAZEPAM 1 MG: 2 INJECTION INTRAMUSCULAR; INTRAVENOUS at 22:00

## 2018-01-01 RX ADMIN — IPRATROPIUM BROMIDE 0.5 MG: 0.5 SOLUTION RESPIRATORY (INHALATION) at 02:21

## 2018-01-01 RX ADMIN — METHYLPREDNISOLONE SODIUM SUCCINATE 40 MG: 40 INJECTION, POWDER, FOR SOLUTION INTRAMUSCULAR; INTRAVENOUS at 23:58

## 2018-01-01 RX ADMIN — LORAZEPAM 1 MG: 2 INJECTION INTRAMUSCULAR; INTRAVENOUS at 14:14

## 2018-01-01 RX ADMIN — ASPIRIN 81 MG 81 MG: 81 TABLET ORAL at 10:33

## 2018-01-01 RX ADMIN — PREDNISONE 10 MG: 10 TABLET ORAL at 10:01

## 2018-01-01 RX ADMIN — GUAIFENESIN 600 MG: 600 TABLET, EXTENDED RELEASE ORAL at 08:17

## 2018-01-01 RX ADMIN — ACETAMINOPHEN 650 MG: 325 TABLET ORAL at 11:54

## 2018-01-01 RX ADMIN — Medication 10 ML: at 14:54

## 2018-01-01 RX ADMIN — LEVALBUTEROL HYDROCHLORIDE 1.25 MG: 1.25 SOLUTION RESPIRATORY (INHALATION) at 08:28

## 2018-01-01 RX ADMIN — HEPARIN SODIUM 5000 UNITS: 5000 INJECTION, SOLUTION INTRAVENOUS; SUBCUTANEOUS at 11:01

## 2018-01-01 RX ADMIN — HEPARIN SODIUM 5000 UNITS: 5000 INJECTION, SOLUTION INTRAVENOUS; SUBCUTANEOUS at 17:01

## 2018-01-01 RX ADMIN — HEPARIN SODIUM 5000 UNITS: 5000 INJECTION, SOLUTION INTRAVENOUS; SUBCUTANEOUS at 01:49

## 2018-01-01 RX ADMIN — IPRATROPIUM BROMIDE AND ALBUTEROL SULFATE 3 ML: .5; 3 SOLUTION RESPIRATORY (INHALATION) at 11:31

## 2018-01-01 RX ADMIN — METOPROLOL TARTRATE 2.5 MG: 5 INJECTION, SOLUTION INTRAVENOUS at 22:36

## 2018-01-01 RX ADMIN — METOPROLOL SUCCINATE 25 MG: 25 TABLET, EXTENDED RELEASE ORAL at 08:50

## 2018-01-01 RX ADMIN — HEPARIN SODIUM 5000 UNITS: 5000 INJECTION, SOLUTION INTRAVENOUS; SUBCUTANEOUS at 23:58

## 2018-01-01 RX ADMIN — IPRATROPIUM BROMIDE 0.5 MG: 0.5 SOLUTION RESPIRATORY (INHALATION) at 14:58

## 2018-01-01 RX ADMIN — PRAVASTATIN SODIUM 80 MG: 40 TABLET ORAL at 08:17

## 2018-01-01 RX ADMIN — SODIUM CHLORIDE 40 MG: 9 INJECTION, SOLUTION INTRAMUSCULAR; INTRAVENOUS; SUBCUTANEOUS at 09:14

## 2018-01-01 RX ADMIN — IPRATROPIUM BROMIDE 0.5 MG: 0.5 SOLUTION RESPIRATORY (INHALATION) at 15:08

## 2018-01-01 RX ADMIN — HYDROCODONE BITARTRATE AND ACETAMINOPHEN 1 TABLET: 5; 325 TABLET ORAL at 21:22

## 2018-01-01 RX ADMIN — METHYLPREDNISOLONE SODIUM SUCCINATE 60 MG: 125 INJECTION, POWDER, FOR SOLUTION INTRAMUSCULAR; INTRAVENOUS at 14:10

## 2018-01-01 RX ADMIN — ASPIRIN 81 MG 81 MG: 81 TABLET ORAL at 12:35

## 2018-01-01 RX ADMIN — GUAIFENESIN 600 MG: 600 TABLET, EXTENDED RELEASE ORAL at 09:42

## 2018-01-01 RX ADMIN — LEVALBUTEROL HYDROCHLORIDE 1.25 MG: 1.25 SOLUTION RESPIRATORY (INHALATION) at 23:52

## 2018-01-01 RX ADMIN — ASPIRIN 81 MG 81 MG: 81 TABLET ORAL at 09:42

## 2018-01-01 RX ADMIN — MORPHINE SULFATE 2 MG: 4 INJECTION INTRAVENOUS at 17:31

## 2018-01-01 RX ADMIN — GUAIFENESIN 600 MG: 600 TABLET, EXTENDED RELEASE ORAL at 20:06

## 2018-01-01 RX ADMIN — IPRATROPIUM BROMIDE AND ALBUTEROL SULFATE 3 ML: .5; 3 SOLUTION RESPIRATORY (INHALATION) at 01:31

## 2018-01-01 RX ADMIN — METHYLPREDNISOLONE SODIUM SUCCINATE 40 MG: 40 INJECTION, POWDER, FOR SOLUTION INTRAMUSCULAR; INTRAVENOUS at 06:45

## 2018-01-01 RX ADMIN — Medication 10 ML: at 06:04

## 2018-01-01 RX ADMIN — METRONIDAZOLE 500 MG: 500 INJECTION, SOLUTION INTRAVENOUS at 13:35

## 2018-01-01 RX ADMIN — METRONIDAZOLE 500 MG: 500 INJECTION, SOLUTION INTRAVENOUS at 21:23

## 2018-01-01 RX ADMIN — HEPARIN SODIUM 5000 UNITS: 5000 INJECTION, SOLUTION INTRAVENOUS; SUBCUTANEOUS at 18:08

## 2018-01-01 RX ADMIN — HYDROCODONE BITARTRATE AND ACETAMINOPHEN 1 TABLET: 5; 325 TABLET ORAL at 19:54

## 2018-01-01 RX ADMIN — VANCOMYCIN HYDROCHLORIDE 1000 MG: 10 INJECTION, POWDER, LYOPHILIZED, FOR SOLUTION INTRAVENOUS at 14:39

## 2018-01-01 RX ADMIN — ASPIRIN 81 MG 81 MG: 81 TABLET ORAL at 09:39

## 2018-01-01 RX ADMIN — GUAIFENESIN 600 MG: 600 TABLET, EXTENDED RELEASE ORAL at 10:26

## 2018-01-01 RX ADMIN — IPRATROPIUM BROMIDE 0.5 MG: 0.5 SOLUTION RESPIRATORY (INHALATION) at 23:52

## 2018-01-01 RX ADMIN — HALOPERIDOL LACTATE 2 MG: 5 INJECTION, SOLUTION INTRAMUSCULAR at 05:06

## 2018-01-01 RX ADMIN — METOPROLOL SUCCINATE 25 MG: 25 TABLET, EXTENDED RELEASE ORAL at 10:59

## 2018-01-01 RX ADMIN — Medication 10 ML: at 21:16

## 2018-01-01 RX ADMIN — METRONIDAZOLE 500 MG: 500 INJECTION, SOLUTION INTRAVENOUS at 12:33

## 2018-01-01 RX ADMIN — MORPHINE SULFATE 2 MG: 4 INJECTION INTRAVENOUS at 22:00

## 2018-01-01 RX ADMIN — LEVALBUTEROL HYDROCHLORIDE 1.25 MG: 1.25 SOLUTION RESPIRATORY (INHALATION) at 06:14

## 2018-01-01 RX ADMIN — IPRATROPIUM BROMIDE 0.5 MG: 0.5 SOLUTION RESPIRATORY (INHALATION) at 07:24

## 2018-01-01 RX ADMIN — METHYLPREDNISOLONE SODIUM SUCCINATE 40 MG: 40 INJECTION, POWDER, FOR SOLUTION INTRAMUSCULAR; INTRAVENOUS at 05:11

## 2018-01-01 RX ADMIN — LEVALBUTEROL HYDROCHLORIDE 1.25 MG: 1.25 SOLUTION RESPIRATORY (INHALATION) at 20:16

## 2018-01-01 RX ADMIN — VANCOMYCIN HYDROCHLORIDE 750 MG: 10 INJECTION, POWDER, LYOPHILIZED, FOR SOLUTION INTRAVENOUS at 23:01

## 2018-01-01 RX ADMIN — IPRATROPIUM BROMIDE AND ALBUTEROL SULFATE 3 ML: .5; 3 SOLUTION RESPIRATORY (INHALATION) at 11:26

## 2018-01-01 RX ADMIN — LORAZEPAM 1 MG: 2 INJECTION INTRAMUSCULAR; INTRAVENOUS at 11:43

## 2018-01-01 RX ADMIN — SODIUM CHLORIDE 40 MG: 9 INJECTION, SOLUTION INTRAMUSCULAR; INTRAVENOUS; SUBCUTANEOUS at 21:06

## 2018-01-01 RX ADMIN — LEVALBUTEROL HYDROCHLORIDE 1.25 MG: 1.25 SOLUTION RESPIRATORY (INHALATION) at 14:35

## 2018-01-01 RX ADMIN — PREDNISONE 20 MG: 20 TABLET ORAL at 09:42

## 2018-01-01 RX ADMIN — POTASSIUM CHLORIDE 10 MEQ: 200 INJECTION, SOLUTION INTRAVENOUS at 02:57

## 2018-01-01 RX ADMIN — GUAIFENESIN 600 MG: 600 TABLET, EXTENDED RELEASE ORAL at 22:08

## 2018-01-01 RX ADMIN — METHYLPREDNISOLONE SODIUM SUCCINATE 40 MG: 40 INJECTION, POWDER, FOR SOLUTION INTRAMUSCULAR; INTRAVENOUS at 17:47

## 2018-01-01 RX ADMIN — GLYCOPYRROLATE 0.2 MG: 0.2 INJECTION, SOLUTION INTRAMUSCULAR; INTRAVENOUS at 23:06

## 2018-01-01 RX ADMIN — Medication 5 ML: at 07:15

## 2018-01-01 RX ADMIN — HALOPERIDOL LACTATE 2 MG: 5 INJECTION, SOLUTION INTRAMUSCULAR at 22:12

## 2018-01-01 RX ADMIN — HEPARIN SODIUM 5000 UNITS: 5000 INJECTION, SOLUTION INTRAVENOUS; SUBCUTANEOUS at 23:42

## 2018-01-01 RX ADMIN — Medication 10 ML: at 22:08

## 2018-01-01 RX ADMIN — AZITHROMYCIN 500 MG: 500 INJECTION, POWDER, LYOPHILIZED, FOR SOLUTION INTRAVENOUS at 11:33

## 2018-01-01 RX ADMIN — ACETAMINOPHEN 650 MG: 325 TABLET ORAL at 01:06

## 2018-01-01 RX ADMIN — IPRATROPIUM BROMIDE AND ALBUTEROL SULFATE 3 ML: .5; 3 SOLUTION RESPIRATORY (INHALATION) at 07:52

## 2018-01-01 RX ADMIN — ASPIRIN 81 MG 81 MG: 81 TABLET ORAL at 11:00

## 2018-01-01 RX ADMIN — HEPARIN SODIUM 5000 UNITS: 5000 INJECTION, SOLUTION INTRAVENOUS; SUBCUTANEOUS at 22:12

## 2018-01-01 RX ADMIN — SODIUM CHLORIDE 50 ML/HR: 450 INJECTION, SOLUTION INTRAVENOUS at 08:00

## 2018-01-01 RX ADMIN — LEVALBUTEROL HYDROCHLORIDE 1.25 MG: 1.25 SOLUTION RESPIRATORY (INHALATION) at 20:17

## 2018-01-01 RX ADMIN — CHLORHEXIDINE GLUCONATE 15 ML: 1.2 RINSE ORAL at 10:34

## 2018-01-01 RX ADMIN — AMIODARONE HYDROCHLORIDE 1 MG/MIN: 50 INJECTION, SOLUTION INTRAVENOUS at 03:43

## 2018-01-01 RX ADMIN — ONDANSETRON HYDROCHLORIDE 4 MG: 2 INJECTION, SOLUTION INTRAMUSCULAR; INTRAVENOUS at 07:17

## 2018-01-01 RX ADMIN — AZITHROMYCIN 500 MG: 500 INJECTION, POWDER, LYOPHILIZED, FOR SOLUTION INTRAVENOUS at 14:11

## 2018-01-01 RX ADMIN — LEVALBUTEROL HYDROCHLORIDE 1.25 MG: 1.25 SOLUTION RESPIRATORY (INHALATION) at 13:08

## 2018-01-01 RX ADMIN — Medication 10 ML: at 05:13

## 2018-01-01 RX ADMIN — CLONAZEPAM 0.5 MG: 0.5 TABLET ORAL at 13:38

## 2018-01-01 RX ADMIN — CEFEPIME 2 G: 2 INJECTION, POWDER, FOR SOLUTION INTRAMUSCULAR; INTRAVENOUS at 05:01

## 2018-01-01 RX ADMIN — Medication 10 ML: at 21:18

## 2018-01-01 RX ADMIN — HEPARIN SODIUM 5000 UNITS: 5000 INJECTION, SOLUTION INTRAVENOUS; SUBCUTANEOUS at 09:41

## 2018-01-01 RX ADMIN — HYDROCODONE BITARTRATE AND ACETAMINOPHEN 1 TABLET: 5; 325 TABLET ORAL at 12:26

## 2018-01-01 RX ADMIN — GUAIFENESIN 600 MG: 600 TABLET, EXTENDED RELEASE ORAL at 10:01

## 2018-01-01 RX ADMIN — SODIUM CHLORIDE 40 MG: 9 INJECTION, SOLUTION INTRAMUSCULAR; INTRAVENOUS; SUBCUTANEOUS at 08:01

## 2018-01-01 RX ADMIN — CLONAZEPAM 0.5 MG: 0.5 TABLET ORAL at 00:49

## 2018-01-01 RX ADMIN — POTASSIUM CHLORIDE 10 MEQ: 200 INJECTION, SOLUTION INTRAVENOUS at 08:09

## 2018-01-01 RX ADMIN — CEFEPIME 2 G: 2 INJECTION, POWDER, FOR SOLUTION INTRAMUSCULAR; INTRAVENOUS at 05:06

## 2018-01-01 RX ADMIN — GUAIFENESIN 600 MG: 600 TABLET, EXTENDED RELEASE ORAL at 10:33

## 2018-01-01 RX ADMIN — CEFEPIME 2 G: 2 INJECTION, POWDER, FOR SOLUTION INTRAMUSCULAR; INTRAVENOUS at 17:30

## 2018-01-01 RX ADMIN — METRONIDAZOLE 500 MG: 500 INJECTION, SOLUTION INTRAVENOUS at 04:54

## 2018-01-01 RX ADMIN — HEPARIN SODIUM 5000 UNITS: 5000 INJECTION, SOLUTION INTRAVENOUS; SUBCUTANEOUS at 17:43

## 2018-01-01 RX ADMIN — DILTIAZEM HYDROCHLORIDE 20 MG: 5 INJECTION INTRAVENOUS at 02:16

## 2018-01-01 RX ADMIN — ASPIRIN 81 MG 81 MG: 81 TABLET ORAL at 09:40

## 2018-01-01 RX ADMIN — MORPHINE SULFATE 2 MG: 4 INJECTION, SOLUTION INTRAMUSCULAR; INTRAVENOUS at 14:56

## 2018-01-01 RX ADMIN — METRONIDAZOLE 500 MG: 500 INJECTION, SOLUTION INTRAVENOUS at 04:48

## 2018-01-01 RX ADMIN — AMIODARONE HYDROCHLORIDE 150 MG: 50 INJECTION, SOLUTION INTRAVENOUS at 03:23

## 2018-01-01 RX ADMIN — METHYLPREDNISOLONE SODIUM SUCCINATE 40 MG: 40 INJECTION, POWDER, FOR SOLUTION INTRAMUSCULAR; INTRAVENOUS at 20:05

## 2018-01-01 RX ADMIN — IPRATROPIUM BROMIDE AND ALBUTEROL SULFATE 3 ML: .5; 3 SOLUTION RESPIRATORY (INHALATION) at 20:39

## 2018-02-15 PROBLEM — J44.1 ACUTE EXACERBATION OF CHRONIC OBSTRUCTIVE PULMONARY DISEASE (COPD) (HCC): Status: ACTIVE | Noted: 2018-01-01

## 2018-02-15 NOTE — ED NOTES
Lab called to say they do not have urine sample in lab, urine sample states it is \"in process\" Lab states they did not receive a sample and they will cancel the order, urine order placed back into chart.

## 2018-02-15 NOTE — H&P
Hospitalist Admission Note    NAME: Disha Kirkland   :  1945   MRN:  [id number]     Date/Time:  2/15/2018 4:42 PM    Patient PCP: Virginia Desir MD  ________________________________________________________________________    My assessment of this patient's clinical condition and my plan of care is as follows. Assessment / Plan:  Acute COPD exacerbation due to upper respiratory tract infection  Chest x-ray does not show any infiltrate. Start Solu-Medrol, DuoNeb, azithromycin.  Start Mucinex.  Continue oxygen by nasal cannula.  She is not on oxygen at home. If she desaturates, consider getting an ABG and starting on BiPAP. Flu is negative. Diarrhea unclear cause r/o infectious etiology  She has no abdominal pain.  She reports mild nausea but no vomiting.  She has no recent antibiotic exposure. She reports her stools are loose, watery and  no blood in it. Check stool studies including stool for C. difficile.  Check CT of abdomen with oral contrast.  LFTs are within normal limits except for mild elevation of alkaline phosphatase. Lactic acid is within normal limits. Hypertension controlled  Resume metoprolol    Dyslipidemia  Resume home pravastatin    History of coronary artery disease  Patient has no chest pain. Cont metoprolol and statin. Pt is not asa at home. Code Status: dnr  Surrogate Decision Maker: Niloreto Rosenthal    DVT Prophylaxis: heparin      Baseline:  Independent and lives at home        Subjective:   CHIEF COMPLAINT: Sob    HISTORY OF PRESENT ILLNESS:     This is a 64-year-old female with past medical history of coronary artery disease, hypertension, COPD comes to the hospital with chief complaints of shortness of breath since the last 3 weeks.  She reports shortness of breath is mostly exertional even with minimal activity and without any orthopnea or paroxysmal nocturnal dyspnea.  She reports cough with initially clear phlegm but now it is brownish in color.  She does not report any chest pain, palpitations or syncopal episodes.  She also reports recent rhinorrhea along with some headache.  She reports that she had extensive history of smoking but has not smoked the last 2 months.  She does not report any fever or chills.  She denies exposure to sick contacts. Linda Harris denies long-distance travel. Linda Harris denies nausea, vomiting, diarrhea or abdominal pain. On arrival to the hospital, she was noted to have significant wheezing and was also tachycardic and was using accessory muscles.  She did desaturate to 89% upon ambulation on room air.  She was given Solu-Medrol, magnesium sulfate and albuterol breathing treatments. We were asked to admit for work up and evaluation of the above problems. Past Medical History:   Diagnosis Date    [facility]     CAD (coronary artery disease)     COPD     Diabetes ([facility]     Emphysema     Essential hypertension     Gastrointestinal disorder     ulcer    Hyperkalemia     Hyperlipemia     Hypertension         Past Surgical History:   Procedure Laterality Date    CORONARY STENT SINGLE W/PTCA      HX CORONARY STENT PLACEMENT      HX HEART CATHETERIZATION         Social History   Substance Use Topics    Smoking status: Current Every Day Smoker     Packs/day: 0.50     Years: 0.00     Types: Cigarettes    Smokeless tobacco: Never Used    Alcohol use No        Family History  No cad in the family. Allergies   Allergen Reactions    Iodinated Contrast- Oral And Iv Dye Anaphylaxis    Penicillin G Hives    Ciprofloxacin Unable to Obtain    Lyrica [Pregabalin] Other (comments)     lossing balance        Prior to Admission medications    Medication Sig Start Date End Date Taking? Authorizing Provider   HYDROcodone-acetaminophen (NORCO) 5-325 mg per tablet Take 1 Tab by mouth every four (4) hours as needed for Pain. Max Daily Amount: 6 Tabs.  5/10/17   Mike Cohn DO   nystatin (MYCOSTATIN) 100,000 unit/mL suspension Take 5 mL by mouth four (4) times daily. swish and spit  Indications: ORAL CANDIDIASIS 5/10/17   Crow Sawant DO   albuterol (PROVENTIL HFA, VENTOLIN HFA, PROAIR HFA) 90 mcg/actuation inhaler Take  by inhalation. Historical Provider   pravastatin (PRAVACHOL) 80 mg tablet TAKE 1 TABLET BY MOUTH ONCE DAILY 7/6/15   RAMON Hicks   ASPIRIN/ACETAMINOPHEN/CAFFEINE (EXCEDRIN EXTRA STRENGTH PO) Take 1 Tab by mouth as needed. Historical Provider   metoprolol succinate (TOPROL-XL) 25 mg XL tablet Take 0.5 Tabs by mouth two (2) times a day. 1/20/14   Isabel Colon NP   clonazepam (KLONOPIN) 0.5 mg tablet Take 0.5 mg by mouth as needed. 5/7/11   Sedrick Hayes MD       REVIEW OF SYSTEMS:     I am not able to complete the review of systems because:    The patient is intubated and sedated    The patient has altered mental status due to his acute medical problems    The patient has baseline aphasia from prior stroke(s)    The patient has baseline dementia and is not reliable historian    The patient is in acute medical distress and unable to provide information           Total of 12 systems reviewed as follows:       POSITIVE= underlined text  Negative = text not underlined  General:  fever, chills, sweats, generalized weakness, weight loss/gain,      loss of appetite   Eyes:    blurred vision, eye pain, loss of vision, double vision  ENT:    rhinorrhea, pharyngitis   Respiratory:   cough, sputum production, SOB, HANDLEY, wheezing, pleuritic pain   Cardiology:   chest pain, palpitations, orthopnea, PND, edema, syncope   Gastrointestinal:  abdominal pain , N/V, diarrhea, dysphagia, constipation, bleeding   Genitourinary:  frequency, urgency, dysuria, hematuria, incontinence   Muskuloskeletal :  arthralgia, myalgia, back pain  Hematology:  easy bruising, nose or gum bleeding, lymphadenopathy   Dermatological: rash, ulceration, pruritis, color change / jaundice  Endocrine:   hot flashes or polydipsia   Neurological:  headache, dizziness, confusion, focal weakness, paresthesia,     Speech difficulties, memory loss, gait difficulty  Psychological: Feelings of anxiety, depression, agitation    Objective:   VITALS:    Visit Vitals    /66    Pulse (!) 113    Temp 97.8 °F (36.6 °C)    Resp 23    Ht 5' 2\" (1.575 m)    Wt 46.7 kg (103 lb)    SpO2 100%    BMI 18.84 kg/m2       PHYSICAL EXAM:    General:    Cachectic no distress, appears stated age. HEENT: Atraumatic, anicteric sclerae, pink conjunctivae     No oral ulcers, mucosa moist  Neck:  Supple, symmetrical,  thyroid: non tender  Lungs:   Poor air entry, wheezing +, no crackles  Chest wall:  No tenderness  No Accessory muscle use. Heart:   Regular  rhythm,  No  murmur   No edema  Abdomen:   Soft, non-tender. Not distended. Bowel sounds normal  Extremities: No cyanosis. No clubbing,      Skin turgor normal, Capillary refill normal  Skin:     Not pale. Not Jaundiced  No rashes   Psych:  Not anxious or agitated. Neurologic: EOMs intact. No facial asymmetry. No aphasia or slurred speech. Symmetrical strength, Sensation grossly intact.  Alert and oriented X 4.     _______________________________________________________________________  Care Plan discussed with:    Comments   Patient y    Family      RN y    Care Manager                    Consultant:      _______________________________________________________________________  Expected  Disposition:   Home with Family y   HH/PT/OT/RN    SNF/LTC    NEETA    ________________________________________________________________________  TOTAL TIME:  61  Minutes    Critical Care Provided     Minutes non procedure based      Comments    y Reviewed previous records   >50% of visit spent in counseling and coordination of care y Discussion with patient and/or family and questions answered       ________________________________________________________________________  Signed: Nicho Slade MD    Procedures: see electronic medical records for all procedures/Xrays and details which were not copied into this note but were reviewed prior to creation of Plan. LAB DATA REVIEWED:    Recent Results (from the past 24 hour(s))   CBC WITH AUTOMATED DIFF    Collection Time: 02/15/18 11:19 AM   Result Value Ref Range    WBC 10.3 3.6 - 11.0 K/uL    RBC 4.47 3.80 - 5.20 M/uL    HGB 10.4 (L) 11.5 - 16.0 g/dL    HCT 33.2 (L) 35.0 - 47.0 %    MCV 74.3 (L) 80.0 - 99.0 FL    MCH 23.3 (L) 26.0 - 34.0 PG    MCHC 31.3 30.0 - 36.5 g/dL    RDW 19.8 (H) 11.5 - 14.5 %    PLATELET 084 (H) 210 - 400 K/uL    MPV 9.8 8.9 - 12.9 FL    NRBC 0.2 (H) 0  WBC    ABSOLUTE NRBC 0.02 (H) 0.00 - 0.01 K/uL    NEUTROPHILS 84 (H) 32 - 75 %    LYMPHOCYTES 8 (L) 12 - 49 %    MONOCYTES 8 5 - 13 %    EOSINOPHILS 0 0 - 7 %    BASOPHILS 0 0 - 1 %    IMMATURE GRANULOCYTES 1 (H) 0.0 - 0.5 %    ABS. NEUTROPHILS 8.7 (H) 1.8 - 8.0 K/UL    ABS. LYMPHOCYTES 0.8 0.8 - 3.5 K/UL    ABS. MONOCYTES 0.8 0.0 - 1.0 K/UL    ABS. EOSINOPHILS 0.0 0.0 - 0.4 K/UL    ABS. BASOPHILS 0.0 0.0 - 0.1 K/UL    ABS. IMM. GRANS. 0.1 (H) 0.00 - 0.04 K/UL    DF AUTOMATED     METABOLIC PANEL, COMPREHENSIVE    Collection Time: 02/15/18 11:19 AM   Result Value Ref Range    Sodium 141 136 - 145 mmol/L    Potassium 3.5 3.5 - 5.1 mmol/L    Chloride 112 (H) 97 - 108 mmol/L    CO2 18 (L) 21 - 32 mmol/L    Anion gap 11 5 - 15 mmol/L    Glucose 137 (H) 65 - 100 mg/dL    BUN 38 (H) 6 - 20 MG/DL    Creatinine 1.10 (H) 0.55 - 1.02 MG/DL    BUN/Creatinine ratio 35 (H) 12 - 20      GFR est AA 59 (L) >60 ml/min/1.73m2    GFR est non-AA 49 (L) >60 ml/min/1.73m2    Calcium 9.1 8.5 - 10.1 MG/DL    Bilirubin, total 0.4 0.2 - 1.0 MG/DL    ALT (SGPT) 49 12 - 78 U/L    AST (SGOT) 48 (H) 15 - 37 U/L    Alk.  phosphatase 196 (H) 45 - 117 U/L    Protein, total 8.2 6.4 - 8.2 g/dL    Albumin 3.3 (L) 3.5 - 5.0 g/dL    Globulin 4.9 (H) 2.0 - 4.0 g/dL    A-G Ratio 0.7 (L) 1.1 - 2.2     LACTIC ACID    Collection Time: 02/15/18 11:19 AM   Result Value Ref Range    Lactic acid 1.7 0.4 - 2.0 MMOL/L   CK W/ CKMB & INDEX    Collection Time: 02/15/18 11:19 AM   Result Value Ref Range     (H) 26 - 192 U/L    CK - MB 19.2 (H) <3.6 NG/ML    CK-MB Index 5.4 (H) 0 - 2.5     NT-PRO BNP    Collection Time: 02/15/18 11:19 AM   Result Value Ref Range    NT pro-BNP 3659 (H) 0 - 125 PG/ML   TROPONIN I    Collection Time: 02/15/18 11:19 AM   Result Value Ref Range    Troponin-I, Qt. <0.04 <0.05 ng/mL   EKG, 12 LEAD, INITIAL    Collection Time: 02/15/18 11:19 AM   Result Value Ref Range    Ventricular Rate 114 BPM    Atrial Rate 114 BPM    P-R Interval 130 ms    QRS Duration 66 ms    Q-T Interval 328 ms    QTC Calculation (Bezet) 452 ms    Calculated P Axis 94 degrees    Calculated R Axis -4 degrees    Calculated T Axis 67 degrees    Diagnosis       Sinus tachycardia with premature atrial complexes with aberrant conduction  Right atrial enlargement  Possible Inferior infarct , age undetermined  Abnormal ECG  When compared with ECG of 02-AUG-2017 13:27,  aberrant conduction is now present      Confirmed by Aiden Jay ([id number]) on 2/15/2018 2:42:41 PM

## 2018-02-15 NOTE — ED NOTES
Pt presents today via ems for c/o sob. 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 \"

## 2018-02-15 NOTE — ED PROVIDER NOTES
EMERGENCY DEPARTMENT HISTORY AND PHYSICAL EXAM      Date: 2/15/2018  Patient Name: Juany Meza    History of Presenting Illness     Chief Complaint   Patient presents with    Shortness of Breath    Vomiting       History Provided By: Patient    HPI: Juany Meza, 74 y.o. female with PMHx significant for CAD (s/p MI), HTN, HLD, DM, COPD, presents via EMS to the ED with cc of constant, progressively worsening SOB x 3 weeks with associated productive cough of brown sputum. Pt reports use of inhalers without relief, noting she does not have duo-neb treatments at home. She reports that she is a tobacco user but has not been smoking x 1 week due to symptoms; pt normally smokes 1/2 ppd. She reports additional symptom of N/V/D with decreased appetite x 1 week, noting she has not had much to eat or drink x 1 week due to symptoms. She denies any CP, F/C, or abdominal pain. PCP: Lucinda Rose MD    There are no other complaints, changes, or physical findings at this time. Current Outpatient Prescriptions   Medication Sig Dispense Refill    HYDROcodone-acetaminophen (NORCO) 5-325 mg per tablet Take 1 Tab by mouth every four (4) hours as needed for Pain. Max Daily Amount: 6 Tabs. 20 Tab 0    nystatin (MYCOSTATIN) 100,000 unit/mL suspension Take 5 mL by mouth four (4) times daily. swish and spit  Indications: ORAL CANDIDIASIS 473 mL 0    albuterol (PROVENTIL HFA, VENTOLIN HFA, PROAIR HFA) 90 mcg/actuation inhaler Take  by inhalation.  pravastatin (PRAVACHOL) 80 mg tablet TAKE 1 TABLET BY MOUTH ONCE DAILY 90 Tab 0    ASPIRIN/ACETAMINOPHEN/CAFFEINE (EXCEDRIN EXTRA STRENGTH PO) Take 1 Tab by mouth as needed.  metoprolol succinate (TOPROL-XL) 25 mg XL tablet Take 0.5 Tabs by mouth two (2) times a day. 60 Tab 5    clonazepam (KLONOPIN) 0.5 mg tablet Take 0.5 mg by mouth as needed.          Past History     Past Medical History:  Past Medical History:   Diagnosis Date    Acute [facility] ([facility] [address]     CAD (coronary artery disease)     COPD     Diabetes ([facility] [address]     Emphysema     Essential hypertension     Gastrointestinal disorder     ulcer    Hyperkalemia     Hyperlipemia     Hypertension        Past Surgical History:  Past Surgical History:   Procedure Laterality Date    CORONARY STENT SINGLE W/PTCA      HX CORONARY STENT PLACEMENT      HX HEART CATHETERIZATION         Family History:  No family history on file. Social History:  Social History   Substance Use Topics    Smoking status: Current Every Day Smoker     Packs/day: 0.50     Years: 0.00     Types: Cigarettes    Smokeless tobacco: Never Used    Alcohol use No       Allergies: Allergies   Allergen Reactions    Iodinated Contrast- Oral And Iv Dye Anaphylaxis    Penicillin G Hives    Ciprofloxacin Unable to Obtain    Lyrica [Pregabalin] Other (comments)     lossing balance         Review of Systems   Review of Systems   Constitutional: Positive for appetite change. Negative for fatigue and fever. HENT: Negative. Eyes: Negative. Respiratory: Positive for cough and shortness of breath. Negative for wheezing. Cardiovascular: Negative for chest pain and leg swelling. Gastrointestinal: Positive for diarrhea, nausea and vomiting. Negative for blood in stool and constipation. Endocrine: Negative. Genitourinary: Negative for difficulty urinating and dysuria. Musculoskeletal: Negative. Skin: Negative for rash. Allergic/Immunologic: Negative. Neurological: Negative for weakness and numbness. Hematological: Negative. Psychiatric/Behavioral: Negative. Physical Exam   Physical Exam   Constitutional: She is oriented to person, place, and time. She appears well-developed and well-nourished. She appears cachectic. No distress. Thin, elderly female. HENT:   Head: Normocephalic and atraumatic. Mouth/Throat: Oropharynx is clear and moist.   Eyes: Conjunctivae and EOM are normal.   Neck: Neck supple. No JVD present. No tracheal deviation present. Cardiovascular: Regular rhythm and intact distal pulses. Tachycardia present. Exam reveals no gallop and no friction rub. No murmur heard. Pulmonary/Chest: Effort normal. No stridor. Tachypnea noted. No respiratory distress. She has wheezes. Coarse breath sounds bilaterally with expiratory wheezes. Abdominal: Soft. Bowel sounds are normal. She exhibits no distension and no mass. There is no tenderness. There is no guarding. Musculoskeletal: Normal range of motion. She exhibits no edema or tenderness. No deformity   Neurological: She is alert and oriented to person, place, and time. She has normal strength. No focal deficits   Skin: Skin is warm, dry and intact. No rash noted. Psychiatric: She has a normal mood and affect. Her behavior is normal. Judgment and thought content normal.   Nursing note and vitals reviewed. Diagnostic Study Results     Labs -  Recent Results (from the past 12 hour(s))   CBC WITH AUTOMATED DIFF    Collection Time: 02/15/18 11:19 AM   Result Value Ref Range    WBC 10.3 3.6 - 11.0 K/uL    RBC 4.47 3.80 - 5.20 M/uL    HGB 10.4 (L) 11.5 - 16.0 g/dL    HCT 33.2 (L) 35.0 - 47.0 %    MCV 74.3 (L) 80.0 - 99.0 FL    MCH 23.3 (L) 26.0 - 34.0 PG    MCHC 31.3 30.0 - 36.5 g/dL    RDW 19.8 (H) 11.5 - 14.5 %    PLATELET 724 (H) 640 - 400 K/uL    MPV 9.8 8.9 - 12.9 FL    NRBC 0.2 (H) 0  WBC    ABSOLUTE NRBC 0.02 (H) 0.00 - 0.01 K/uL    NEUTROPHILS 84 (H) 32 - 75 %    LYMPHOCYTES 8 (L) 12 - 49 %    MONOCYTES 8 5 - 13 %    EOSINOPHILS 0 0 - 7 %    BASOPHILS 0 0 - 1 %    IMMATURE GRANULOCYTES 1 (H) 0.0 - 0.5 %    ABS. NEUTROPHILS 8.7 (H) 1.8 - 8.0 K/UL    ABS. LYMPHOCYTES 0.8 0.8 - 3.5 K/UL    ABS. MONOCYTES 0.8 0.0 - 1.0 K/UL    ABS. EOSINOPHILS 0.0 0.0 - 0.4 K/UL    ABS. BASOPHILS 0.0 0.0 - 0.1 K/UL    ABS. IMM.  GRANS. 0.1 (H) 0.00 - 0.04 K/UL    DF AUTOMATED     METABOLIC PANEL, COMPREHENSIVE    Collection Time: 02/15/18 11:19 AM   Result Value Ref Range    Sodium 141 136 - 145 mmol/L    Potassium 3.5 3.5 - 5.1 mmol/L    Chloride 112 (H) 97 - 108 mmol/L    CO2 18 (L) 21 - 32 mmol/L    Anion gap 11 5 - 15 mmol/L    Glucose 137 (H) 65 - 100 mg/dL    BUN 38 (H) 6 - 20 MG/DL    Creatinine 1.10 (H) 0.55 - 1.02 MG/DL    BUN/Creatinine ratio 35 (H) 12 - 20      GFR est AA 59 (L) >60 ml/min/1.73m2    GFR est non-AA 49 (L) >60 ml/min/1.73m2    Calcium 9.1 8.5 - 10.1 MG/DL    Bilirubin, total 0.4 0.2 - 1.0 MG/DL    ALT (SGPT) 49 12 - 78 U/L    AST (SGOT) 48 (H) 15 - 37 U/L    Alk.  phosphatase 196 (H) 45 - 117 U/L    Protein, total 8.2 6.4 - 8.2 g/dL    Albumin 3.3 (L) 3.5 - 5.0 g/dL    Globulin 4.9 (H) 2.0 - 4.0 g/dL    A-G Ratio 0.7 (L) 1.1 - 2.2     LACTIC ACID    Collection Time: 02/15/18 11:19 AM   Result Value Ref Range    Lactic acid 1.7 0.4 - 2.0 MMOL/L   CK W/ CKMB & INDEX    Collection Time: 02/15/18 11:19 AM   Result Value Ref Range     (H) 26 - 192 U/L    CK - MB 19.2 (H) <3.6 NG/ML    CK-MB Index 5.4 (H) 0 - 2.5     NT-PRO BNP    Collection Time: 02/15/18 11:19 AM   Result Value Ref Range    NT pro-BNP 3659 (H) 0 - 125 PG/ML   TROPONIN I    Collection Time: 02/15/18 11:19 AM   Result Value Ref Range    Troponin-I, Qt. <0.04 <0.05 ng/mL   EKG, 12 LEAD, INITIAL    Collection Time: 02/15/18 11:19 AM   Result Value Ref Range    Ventricular Rate 114 BPM    Atrial Rate 114 BPM    P-R Interval 130 ms    QRS Duration 66 ms    Q-T Interval 328 ms    QTC Calculation (Bezet) 452 ms    Calculated P Axis 94 degrees    Calculated R Axis -4 degrees    Calculated T Axis 67 degrees    Diagnosis       Sinus tachycardia with premature atrial complexes with aberrant conduction  Right atrial enlargement  Possible Inferior infarct , age undetermined  Abnormal ECG  When compared with ECG of 02-AUG-2017 13:27,  aberrant conduction is now present      Confirmed by Amaris Mercedes ([id number]) on 2/15/2018 2:42:41 PM         Radiologic Studies -  CXR Results  (Last 48 hours)               02/15/18 1403  XR CHEST PORT Final result    Impression:  Impression: No acute process. Stable COPD. Narrative: Indication: Dyspnea, COPD       Comparison: 8/2/2017       Portable exam of the chest obtained at 1221 demonstrates normal heart size. There is no acute process in the lung fields. Emphysematous changes are again   noted. The bones are osteopenic. Medical Decision Making   I am the first provider for this patient. I reviewed the vital signs, available nursing notes, past medical history, past surgical history, family history and social history. Vital Signs-Reviewed the patient's vital signs. Patient Vitals for the past 12 hrs:   Temp Pulse Resp BP SpO2   02/15/18 1547 - (!) 113 23 - 100 %   02/15/18 1400 - (!) 107 20 144/66 96 %   02/15/18 1330 - (!) 107 19 143/69 100 %   02/15/18 1245 - (!) 110 29 156/69 97 %   02/15/18 1200 - (!) 117 16 159/73 -   02/15/18 1100 97.8 °F (36.6 °C) (!) 117 27 134/75 96 %       Pulse Oximetry Analysis - 100% on RA    Cardiac Monitor:   Rate: 108 bpm  Rhythm: Sinus Tachycardia     EKG interpretation: (Preliminary) 1127  Rhythm: sinus tachycardia with PAC's with aberrant conduction; and regular . Rate (approx.): 114; Axis: normal; LA interval: normal; QRS interval: normal ; ST/T wave: normal;  Written by Selina Thomas ED Scribe, as dictated by Shana Watts DO. Records Reviewed: Nursing Notes and Old Medical Records    Provider Notes (Medical Decision Making):   DDx: COPD exacerbation, pneumonia, viral illness, ACS, pulmonary edema    ED Course:   Initial assessment performed. The patients presenting problems have been discussed, and they are in agreement with the care plan formulated and outlined with them. I have encouraged them to ask questions as they arise throughout their visit.    3:01 PM  Pt re-assessed, she is feeling better, lung sounds improved and are now clear. Will ambulate.     3:48   Pt ambulated. Was unable to get a good pulse ox on pt, however, she became very dyspneic. Will likely admit for COPD exacerbation. CONSULT NOTE:   4:11 PM  Marcus Moore  spoke with Dr. Ethan Lomas,   Specialty: Hospitalist  Discussed pt's hx, disposition, and available diagnostic and imaging results. Reviewed care plans. Consultant will evaluate pt for admission. Disposition:  Admit Note:  4:11 PM  Patient is being admitted to the hospital by Dr. Ethan Lomas. The results of their tests and reasons for their admission have been discussed with them and/or available family. They convey agreement and understanding for the need to be admitted and for their admission diagnosis. Consultation has been made with the inpatient physician specialist for hospitalization. PLAN:  1. Admit to Hospitalist     Diagnosis     Clinical Impression:   1. COPD exacerbation ([address]        Attestations: This note is prepared by Allison Waters, acting as Scribe for Marcus Moore DO. Marcus Moore DO: The scribe's documentation has been prepared under my direction and personally reviewed by me in its entirety. I confirm that the note above accurately reflects all work, treatment, procedures, and medical decision making performed by me.

## 2018-02-15 NOTE — ED NOTES
Forehead pulse ox placed on pt, O2 sats at 98% on 2L nc, pt refusing to get up to ambulate at this time.

## 2018-02-15 NOTE — ED NOTES
Pt ambulated in hallway on room air approx 50 ft. Pt reports sob and and O2 sats 89%, pt back in room sitting in bed O2 97% but poor pleth.  Respiratory paged for forehead pulse ox, discussed with Dr. Madiha Drew

## 2018-02-16 NOTE — PROGRESS NOTES
12:36 PM  TRANSFER - IN REPORT:    Verbal report received from Amirah(name) on Genora Inch  being received from ED(unit) for routine progression of care      Report consisted of patients Situation, Background, Assessment and   Recommendations(SBAR). Information from the following report(s) SBAR, Kardex and ED Summary was reviewed with the receiving nurse. Opportunity for questions and clarification was provided. Assessment completed upon patients arrival to unit and care assumed.            Blanca CANDELARIA RN

## 2018-02-16 NOTE — ED NOTES
Shift report received from Georges Camarena, [facility]. Assumed care of patient. Patient placed in position of comfort. Call bell in reach.

## 2018-02-16 NOTE — PROGRESS NOTES
PCU SHIFT NURSING NOTE    1:00 PM  Pt appears to be pt of [facility]- called consult to their office. Pt is on her way up from ED    1:20 PM  Pt arrived to room on 2L NC and dilt gtt at 2.5/hr. Tele shows NSR with PAC. BP WNL. Pt noted have SpO2 >98% on RA, will leave off supplemental oxygen as pt doesn't wear this at home and is in no distress. Database incomplete, will speak with pt to update. 1:40 PM  Pt requesting clonazepam - states she takes this four times/day at home. Given as PRN order along with Robitussin for cough. Pt qualifies for MRSA swab however has already received antibiotics in ED.    1:45 PM  Dr Violet Yeager at bedside- dilt gtt off due to NSR.    2:30 PM  Orders written to transfer patient to tele floor. 3:30 PM  Pt with coughing episode, placed pt on 3L NC for recovery during episode, now down to 1L for comfort. 4 PM  Pt back on RA with SpO2 100%      Admission Date 2/15/2018   Admission Diagnosis Acute exacerbation of chronic obstructive pulmonary disease (COPD) ([facility] [address]   Consults IP CONSULT TO HOSPITALIST  IP CONSULT TO CARDIOLOGY        Consults   [x]PT   [x]OT   []Speech   [x]Case Management      [] Palliative      Cardiac Monitoring Order   [x]Yes   []No     IV drips   []Yes    Drip:                            Dose:  Drip:                            Dose:  Drip:                            Dose:   [x]No     GI Prophylaxis   []Yes   []No         DVT Prophylaxis   SCDs:             Gerhard stockings:         [] Medication   []Contraindicated   []None      Activity Level Activity Level: Up with Assistance     Activity Assistance: Partial (one person)   Purposeful Rounding every 1-2 hour?    [x]Yes   Powell Score  Total Score: 2   Bed Alarm (If score 3 or >)   []Yes   [] Refused (See signed refusal form in chart)   Sal Score  Sal Score: 14   Sal Score (if score 14 or less)   []PMT consult   []Wound Care consult      []Specialty bed   [] Nutrition consult          Needs prior to discharge:   Home O2 required:    []Yes   [x]No    If yes, how much O2 required? Other:    Last Bowel Movement: Last Bowel Movement Date: 02/15/18      Influenza Vaccine Received Flu Vaccine for Current Season (usually Sept-March): Yes        Pneumonia Vaccine           Diet Active Orders   Diet    DIET CARDIAC Regular      LDAs               Peripheral IV 08/02/17 Right Arm (Active)       Peripheral IV 02/15/18 Left Antecubital (Active)   Site Assessment Clean, dry, & intact 2/15/2018  9:13 PM   Phlebitis Assessment 0 2/15/2018  9:13 PM   Infiltration Assessment 0 2/15/2018  9:13 PM   Dressing Status Clean, dry, & intact 2/15/2018  9:13 PM   Dressing Type 4 X 4 2/15/2018  9:13 PM                      Urinary Catheter      Intake & Output   Date 02/15/18 0700 - 02/16/18 0659 02/16/18 0700 - 02/17/18 0659   Shift 5814-0550 2735-0727 24 Hour Total 9347-1301 3176-6742 24 Hour Total   I  N  T  A  K  E   Shift Total  (mL/kg)         O  U  T  P  U  T   Urine  (mL/kg/hr)            Urine Occurrence(s)    1 x  1 x    Shift Total  (mL/kg)         NET         Weight (kg) 46.7 46.7 46.7 46.7 46.7 46.7         Readmission Risk Assessment Tool Score Medium Risk            20       Total Score        3 Has Seen PCP in Last 6 Months (Yes=3, No=0)    9 Pt. Coverage (Medicare=5 , Medicaid, or Self-Pay=4)    8 Charlson Comorbidity Score (Age + Comorbid Conditions)        Criteria that do not apply:    . Living with Significant Other. Assisted Living. LTAC. SNF.  or   Rehab    Patient Length of Stay (>5 days = 3)    IP Visits Last 12 Months (1-3=4, 4=9, >4=11)       Expected Length of Stay - - -   Actual Length of Stay 1          Primary Nurse Katelyn Fan RN and Thelma Negro RN performed a dual skin assessment on this patient No impairment noted  Sal score is 19

## 2018-02-16 NOTE — PROGRESS NOTES
Called to evaluate patient with Rapid atrial fibrillation 170  She denies prior history of Afib or CHF    Will give diltiazem 15mg IV x 1 followed by infusion to titrate for VR

## 2018-02-16 NOTE — PROGRESS NOTES
Pharmacy Clarification of the Prior to Admission Medication Regimen Retrospective to the Admission Medication Reconciliation    The patient was interviewed regarding clarification of the prior to admission medication regimen and was questioned regarding use of any other inhalers, topical products, over the counter medications, herbal medications, vitamin products or ophthalmic/nasal/otic medication use. Information Obtained From: Rx Query and patient    Recommendations/Findings: The following amendments were made to the patient's active medication list on file at [address]:     1) Additions:    metoprolol tartrate (LOPRESSOR) 25 mg tablet   oxyCODONE IR (ROXICODONE) 10 mg tab immediate release tablet      2) Removals:    Norco 5-325 mg   Metoprolol Succinate   Nystatin Suspension      3) Changes:   albuterol (PROVENTIL HFA, VENTOLIN HFA, PROAIR HFA) 90 mcg/actuation inhaler (Old regimen: take by inhalation Yazmin Rands regimen: 2 puffs Q4H PRN)   ASPIRIN/ACETAMINOPHEN/CAFFEINE (EXCEDRIN EXTRA STRENGTH PO) (Old regimen: take 1 tab PRN /New regimen: 1 tab daily PRN)   clonazepam (KLONOPIN) 0.5 mg tablet (Old regimen: 0.5 mg by mouth /New regimen: 0.5 mg QID)      4) Pertinent Pharmacy Findings:   clonazepam (KLONOPIN) 0.5 mg tablet: Patient stated she ran out of this medication on Monday and has not had any since. PTA medication list was corrected to the following:     Prior to Admission Medications   Prescriptions Last Dose Informant Patient Reported? Taking? ASPIRIN/ACETAMINOPHEN/CAFFEINE (EXCEDRIN EXTRA STRENGTH PO) 2/14/2018 at Unknown time Self Yes Yes   Sig: Take 1 Tab by mouth daily as needed for Pain. albuterol (PROVENTIL HFA, VENTOLIN HFA, PROAIR HFA) 90 mcg/actuation inhaler 2/15/2018 at Unknown time Self Yes Yes   Sig: Take 2 Puffs by inhalation every four (4) hours as needed for Shortness of Breath.    clonazepam (KLONOPIN) 0.5 mg tablet 2/12/2018 at Unknown time Self Yes No   Sig: Take 0.5 mg by mouth four (4) times daily. metoprolol tartrate (LOPRESSOR) 25 mg tablet 2/14/2018 at Unknown time Self Yes Yes   Sig: Take 12.5 mg by mouth two (2) times a day.    oxyCODONE IR (ROXICODONE) 10 mg tab immediate release tablet 2/12/2018 at Unknown time Self Yes Yes   Sig: Take 10 mg by mouth every four (4) hours as needed for Pain.   pravastatin (PRAVACHOL) 80 mg tablet 2/14/2018 at Unknown time Self No Yes   Sig: TAKE 1 TABLET BY MOUTH ONCE DAILY      Facility-Administered Medications: None          Thank you,  Kate Patel CPhT  Medication History Pharmacy Technician

## 2018-02-16 NOTE — CDMP QUERY
Patient is noted to have a BMI of 18.8      Please clarify if this patient is:     =>Underweight  =>Cachexia  =>Failure to Thrive  =>Other explanation of clinical findings  =>Unable to determine (no explanation for clinical findings)    Presentation: 5' 2\" , 101bs = BMI  18.8    Please clarify and document your clinical opinion in the progress notes and discharge summary, including the definitive and or presumptive diagnosis, (suspected or probable), related to the above clinical findings. Please include clinical findings supporting your diagnosis.      Thank you  Rachel Guzman, YUSEFN, RN, Keily Manriquez  [phone]

## 2018-02-16 NOTE — ED NOTES
Pt calm cooperative, no c/o pain at this time. Pt in sinus rhythm with rate of 99, will continue to monitor.

## 2018-02-16 NOTE — CONSULTS
Subjective:      Date of  Admission: 2/15/2018 10:48 AM     Admission type:Emergency    Basia Martinez is a 74 y.o. female admitted for Acute exacerbation of chronic obstructive pulmonary disease*. Presented with worsening SOB, hypoxia, cough, flu like symptoms. In ER noted to be in a. Fib with RVR. Given IV cardizem and now in regular rhythm. She denies chest pain, chest pressure/discomfort, palpitations, irregular heart beats, near-syncope, syncope, exertional chest pressure/discomfort, claudication, lower extremity edema. Normally f/u with Dr. Moreno Merlos. Feels better now.      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 06/06/2014    Chronic ischemic heart disease 01/06/2014    Multiple bruises 05/14/2013    Anxiety and depression 07/25/2012    Postsurgical percutaneous transluminal coronary angioplasty status 07/25/2012    Tobacco use disorder 07/25/2012    Old myocardial infarction 07/25/2012    Essential hypertension, benign 07/25/2012    Coronary atherosclerosis of native coronary artery 07/25/2012    Mixed hyperlipidemia 07/25/2012    Acute myocardial infarction of other inferior wall, subsequent episode of care 07/25/2012      Silvia Yates MD  Past Medical History:   Diagnosis Date    [facility])     CAD (coronary artery disease)     COPD     Diabetes ([facility]     Emphysema     Essential hypertension     Gastrointestinal disorder     ulcer    Hyperkalemia     Hyperlipemia     Hypertension       Past Surgical History:   Procedure Laterality Date    CORONARY STENT SINGLE W/PTCA      HX CORONARY STENT PLACEMENT      HX HEART CATHETERIZATION       Allergies   Allergen Reactions    Iodinated Contrast- Oral And Iv Dye Anaphylaxis    Penicillin G Hives    Ciprofloxacin Unable to Obtain    Lyrica [Pregabalin] Other (comments)     lossing balance      No family history on file. Current Facility-Administered Medications   Medication Dose Route Frequency    clonazePAM (KlonoPIN) tablet 0.5 mg  0.5 mg Oral QID    methylPREDNISolone (PF) (SOLU-MEDROL) injection 40 mg  40 mg IntraVENous Q12H    [START ON 2/17/2018] azithromycin (ZITHROMAX) tablet 500 mg  500 mg Oral DAILY    levalbuterol (XOPENEX) nebulizer soln 1.25 mg/3 mL  1.25 mg Nebulization Q6H RT    [START ON 2/17/2018] aspirin chewable tablet 81 mg  81 mg Oral DAILY    metoprolol succinate (TOPROL-XL) XL tablet 25 mg  25 mg Oral BID    HYDROcodone-acetaminophen (NORCO) 5-325 mg per tablet 1 Tab  1 Tab Oral Q4H PRN    pravastatin (PRAVACHOL) tablet 80 mg  80 mg Oral DAILY    sodium chloride (NS) flush 5-10 mL  5-10 mL IntraVENous Q8H    sodium chloride (NS) flush 5-10 mL  5-10 mL IntraVENous PRN    acetaminophen (TYLENOL) tablet 650 mg  650 mg Oral Q6H PRN    ondansetron (ZOFRAN) injection 4 mg  4 mg IntraVENous Q6H PRN    docusate sodium (COLACE) capsule 100 mg  100 mg Oral DAILY PRN    heparin (porcine) injection 5,000 Units  5,000 Units SubCUTAneous Q8H    guaiFENesin ER (MUCINEX) tablet 600 mg  600 mg Oral Q12H    guaiFENesin (ROBITUSSIN) 100 mg/5 mL oral liquid 100 mg  100 mg Oral TID PRN         Review of Symptoms:  Constitutional: negative  Eyes: negative  Ears, nose, mouth, throat, and face: negative  Respiratory: No hemoptysis. Cardiovascular: No CP, palpitations, sweating, lightheadedness, dizziness, syncope, presyncope, lower extremity swelling. Gastrointestinal: No nausea, vomiting, diarrhea, constipation, abdominal pain, hematemesis, melena, hematochezia  Genitourinary:No urinary complaints.   Musculoskeletal:negative  Neurological: negative  Behvioral/Psych: negative  Endocrine: negative     Subjective:      Visit Vitals    BP 133/78 (BP 1 Location: Right arm, BP Patient Position: At rest)    Pulse 91    Temp 97.7 °F (36.5 °C)    Resp 20    Ht 5' 2\" (1.575 m)    Wt 103 lb (46.7 kg)    SpO2 100%    BMI 18.84 kg/m2       Physical Exam  Abdomen: soft, non-tender.  Bowel sounds normal.   Extremities: no cyanosis or edema  Heart: regular rate and rhythm, S1, S2 normal, no murmur, click, rub or gallop  Lungs: decrease breath sound intensity bilaterally  Neck: supple, no carotid bruit and no JVD  Neurologic: Grossly normal  Pulses: 1+      Cardiographics    Telemetry: normal sinus rhythm    ECG: atrial fibrillation    Echocardiogram: Not done    Labs:   Recent Results (from the past 24 hour(s))   TROPONIN I    Collection Time: 02/15/18  7:39 PM   Result Value Ref Range    Troponin-I, Qt. 0.06 (H) <0.05 ng/mL   URINALYSIS W/ REFLEX CULTURE    Collection Time: 02/15/18  8:29 PM   Result Value Ref Range    Color YELLOW/STRAW      Appearance CLEAR CLEAR      Specific gravity 1.024 1.003 - 1.030      pH (UA) 5.5 5.0 - 8.0      Protein 300 (A) NEG mg/dL    Glucose NEGATIVE  NEG mg/dL    Ketone NEGATIVE  NEG mg/dL    Bilirubin NEGATIVE  NEG      Blood MODERATE (A) NEG      Urobilinogen 0.2 0.2 - 1.0 EU/dL    Nitrites NEGATIVE  NEG      Leukocyte Esterase NEGATIVE  NEG      WBC 0-4 0 - 4 /hpf    RBC 5-10 0 - 5 /hpf    Epithelial cells FEW FEW /lpf    Bacteria NEGATIVE  NEG /hpf    UA:UC IF INDICATED CULTURE NOT INDICATED BY UA RESULT CNI     EKG, 12 LEAD, SUBSEQUENT    Collection Time: 02/15/18 11:45 PM   Result Value Ref Range    Ventricular Rate 175 BPM    Atrial Rate 182 BPM    QRS Duration 72 ms    Q-T Interval 278 ms    QTC Calculation (Bezet) 474 ms    Calculated R Axis -27 degrees    Calculated T Axis -105 degrees    Diagnosis       Atrial fibrillation with rapid ventricular response  Low voltage QRS  Inferior infarct (cited on or before 02-AUG-2017)  Abnormal ECG  When compared with ECG of 15-FEB-2018 11:19,  Atrial fibrillation has replaced Sinus rhythm  Vent. rate has increased BY  61 BPM  Questionable change in initial forces of Inferior leads  ST more depressed in Anterolateral leads  Nonspecific T wave abnormality now evident in Inferior leads     METABOLIC PANEL, BASIC    Collection Time: 02/16/18  4:09 AM   Result Value Ref Range    Sodium 142 136 - 145 mmol/L    Potassium 3.3 (L) 3.5 - 5.1 mmol/L    Chloride 114 (H) 97 - 108 mmol/L    CO2 16 (L) 21 - 32 mmol/L    Anion gap 12 5 - 15 mmol/L    Glucose 168 (H) 65 - 100 mg/dL    BUN 29 (H) 6 - 20 MG/DL    Creatinine 0.83 0.55 - 1.02 MG/DL    BUN/Creatinine ratio 35 (H) 12 - 20      GFR est AA >60 >60 ml/min/1.73m2    GFR est non-AA >60 >60 ml/min/1.73m2    Calcium 8.2 (L) 8.5 - 10.1 MG/DL   CBC WITH AUTOMATED DIFF    Collection Time: 02/16/18  4:09 AM   Result Value Ref Range    WBC 8.2 3.6 - 11.0 K/uL    RBC 3.90 3.80 - 5.20 M/uL    HGB 9.0 (L) 11.5 - 16.0 g/dL    HCT 28.8 (L) 35.0 - 47.0 %    MCV 73.8 (L) 80.0 - 99.0 FL    MCH 23.1 (L) 26.0 - 34.0 PG    MCHC 31.3 30.0 - 36.5 g/dL    RDW 19.6 (H) 11.5 - 14.5 %    PLATELET 077 (H) 711 - 400 K/uL    MPV 10.2 8.9 - 12.9 FL    NRBC 0.4 (H) 0  WBC    ABSOLUTE NRBC 0.03 (H) 0.00 - 0.01 K/uL    NEUTROPHILS 89 (H) 32 - 75 %    LYMPHOCYTES 6 (L) 12 - 49 %    MONOCYTES 4 (L) 5 - 13 %    EOSINOPHILS 0 0 - 7 %    BASOPHILS 0 0 - 1 %    IMMATURE GRANULOCYTES 1 (H) 0.0 - 0.5 %    ABS. NEUTROPHILS 7.3 1.8 - 8.0 K/UL    ABS. LYMPHOCYTES 0.5 (L) 0.8 - 3.5 K/UL    ABS. MONOCYTES 0.3 0.0 - 1.0 K/UL    ABS. EOSINOPHILS 0.0 0.0 - 0.4 K/UL    ABS. BASOPHILS 0.0 0.0 - 0.1 K/UL    ABS. IMM.  GRANS. 0.1 (H) 0.00 - 0.04 K/UL    DF SMEAR SCANNED      RBC COMMENTS ANISOCYTOSIS  1+       TROPONIN I    Collection Time: 02/16/18  8:50 AM   Result Value Ref Range    Troponin-I, Qt. 0.06 (H) <0.05 ng/mL        Assessment:     Assessment:       Active Problems:    Acute exacerbation of chronic obstructive pulmonary disease (COPD) ([facility] (2/15/2018)         Plan: 1. Transient a. Fib: most likely due to underlying COPD and hypoxia. Check Echo. Increase lopressor dose. Add aspirin. 2. H/o CAD: last stent in 2009. Stable. Continue BB, statin. Aspirin. 3. resp insuff: due to COPD. Per hospitalist. Will follow.

## 2018-02-16 NOTE — PROGRESS NOTES
Hospitalist Progress Note    NAME: Romina Benito   :  1945   MRN:  [id number]   LOS:   1      Assessment / Plan:  Acute COPD exacerbation due to upper respiratory tract infection  -wheezing improving   -will continue IV steroids  -switch to xopenex due to tachycardia  -azithromycin  -mucinex  -needs refills for inhalers and needs new PCP on discharge    Paroxysmal atrial fibrillation  -likely in setting of acute illness  -off of drip now in NSR  -cardiology consulted, sees Dr. Janet Arzate as OP  -echo  -now off drip can transfer to telemetry floor     Diarrhea unclear cause r/o infectious etiology  -follow stool studies    CAD, HTN, HLD  -continue asprin, BB, statin     Underweight  Body mass index is 18.84 kg/(m^2). Code Status: dnr  Surrogate Decision Maker: Eliza oLpez  DVT Prophylaxis: heparin     Subjective:     Chief Complaint: sob  Breathing improved, deneis any palpitations or chest pain      Objective:     VITALS:   Last 24hrs VS reviewed since prior progress note.  Most recent are:  Patient Vitals for the past 24 hrs:   Temp Pulse Resp BP SpO2   18 1317 97.7 °F (36.5 °C) 91 20 133/78 100 %   18 1245 - 89 20 121/55 100 %   18 1200 - 94 20 124/60 98 %   18 1115 - 90 17 117/61 97 %   18 1105 - - - 123/58 -   18 1056 - 93 19 - 100 %   18 1042 - 94 17 - 100 %   18 1021 - 96 20 - 100 %   18 1000 - (!) 109 18 148/73 100 %   18 0900 - 98 20 140/60 95 %   18 0800 97.9 °F (36.6 °C) 93 21 131/67 100 %   18 0715 - 85 14 - 100 %   18 0700 - 91 19 124/67 100 %   18 0600 - 94 20 145/65 100 %   18 0500 97.9 °F (36.6 °C) 93 17 131/67 99 %   18 0445 - 87 16 - 100 %   18 0400 - 83 13 132/60 100 %   18 0300 - 92 16 146/76 100 %   18 0015 - (!) 165 14 - -   02/15/18 2345 - (!) 174 18 - -   02/15/18 2330 - (!) 162 15 - -   02/15/18 2300 - (!) 101 16 127/67 (!) 80 %   02/15/18 2200 - 96 25 158/67 - 02/15/18 2100 - 100 21 151/71 92 %   02/15/18 2000 - 98 17 139/61 98 %   02/15/18 1942 - (!) 103 19 152/74 94 %   02/15/18 1900 - (!) 104 21 156/77 -   02/15/18 1807 - (!) 104 21 148/69 98 %   02/15/18 1745 - (!) 103 22 148/69 -   02/15/18 1547 - (!) 113 23 - 100 %     No intake or output data in the 24 hours ending 02/16/18 1419     PHYSICAL EXAM:  General: Alert, cachectic appearing cooperative, no acute distress    EENT:  EOMI. Anicteric sclerae. Mucous membranes moist  Resp:  CTA bilaterally, some exp wheezing. No accessory muscle use  CV:  Regular rhythm, no edema  GI:  Soft, non distended, non tender. +Bowel sounds  Neurologic:  Alert and oriented X 3, normal speech  Psych:   Good insight, not anxious nor agitated  Skin:  No rashes, no jaundice  ________________________________________________________________________  Care Plan discussed with:    Comments   Patient x    Family      RN x    Care Manager     Consultant                        Multidiciplinary team rounds were held today with , nursing, pharmacist and clinical coordinator. Patient's plan of care was discussed; medications were reviewed and discharge planning was addressed. ________________________________________________________________________  Total NON critical care TIME:  25   Minutes    >50% of visit spent in counseling and coordination of care       ________________________________________________________________________    Procedures: see electronic medical records for all procedures/Xrays and details which were not copied into this note but were reviewed prior to creation of Plan. LABS:  I reviewed today's most current labs and imaging studies.   Pertinent labs include:  Recent Labs      02/16/18   0409  02/15/18   1119   WBC  8.2  10.3   HGB  9.0*  10.4*   HCT  28.8*  33.2*   PLT  426*  509*     Recent Labs      02/16/18   0409  02/15/18   1119   NA  142  141   K  3.3*  3.5   CL  114*  112*   CO2  16*  18*   GLU 168*  137*   BUN  29*  38*   CREA  0.83  1.10*   CA  8.2*  9.1   ALB   --   3.3*   TBILI   --   0.4   SGOT   --   48*   ALT   --   49       Signed: New Delacruz MD

## 2018-02-16 NOTE — ED NOTES
2330: pt rate change to 180, pt rhythm change to afib. Confirmed with EKG, Hospitalist aware, ordered for diltizem placed. Pt did not break with first dose of 15mg Dilt, pt placed on Cardizem drip. Rate change from 97 to 102.  Will repeat EKG when in sinus

## 2018-02-16 NOTE — ED NOTES
TRANSFER - OUT REPORT:    Verbal report given to austin(name) on Xuan Weiss  being transferred to pcu(unit) for routine progression of care       Report consisted of patients Situation, Background, Assessment and   Recommendations(SBAR). Information from the following report(s) SBAR, Kardex and ED Summary was reviewed with the receiving nurse. Lines:   Peripheral IV 08/02/17 Right Arm (Active)       Peripheral IV 02/15/18 Left Antecubital (Active)   Site Assessment Clean, dry, & intact 2/15/2018  9:13 PM   Phlebitis Assessment 0 2/15/2018  9:13 PM   Infiltration Assessment 0 2/15/2018  9:13 PM   Dressing Status Clean, dry, & intact 2/15/2018  9:13 PM   Dressing Type 4 X 4 2/15/2018  9:13 PM        Opportunity for questions and clarification was provided.       Patient transported with:   Monitor  O2 @ 2 liters  [address] [place] [address]

## 2018-02-16 NOTE — PROGRESS NOTES
Problem: Mobility Impaired (Adult and Pediatric)  Goal: *Acute Goals and Plan of Care (Insert Text)  Physical Therapy Goals  Initiated 2/16/2018  1. Patient will move from supine to sit and sit to supine , scoot up and down and roll side to side in bed with independence within 7 day(s). Remainder of goals to be set upon further assessment     physical Therapy EVALUATION  Patient: Brea Lehman (3 y.o. female)  Date: 2/16/2018  Primary Diagnosis: Acute exacerbation of chronic obstructive pulmonary disease (COPD) ([facility])        Precautions:   Contact, Fall, Bed Alarm    ASSESSMENT :  Based on the objective data described below, the patient presents with generalized weakness, activity intolerance with multiple co morbidities impacting functional skills. Patient I PTA, not on home O2, with 40 years of cigarette use despite COPD. Received in supine and poorly agreeable 2/2 'weakness' with author explaining role of therapy yet patient still refusing activity. She was agreeable to bed mobility with mod-I to do so with use of bed rails. Patient with significant fall history and DM2 (although denies neuorapthy) that is deserving of additional testing ahead alongside acute intervention for recovery from acute illness. TBD regarding recommendation for DC at this time. Recommend with nursing patient (who was advised to do so over the weekend) to complete as able in order to maintain strength, endurance and independence: OOB to chair 3x/day and walking daily with assist. Thank you for your assistance. Recommend 5x sit<>stand, 6MW, TUG next session as formal testing. Patient will benefit from skilled intervention to address the above impairments.   Patients rehabilitation potential is considered to be Good  Factors which may influence rehabilitation potential include:   []         None noted  []         Mental ability/status  [x]         Medical condition  [x]         Home/family situation and support systems  [] Safety awareness  []         Pain tolerance/management  []         Other:      PLAN :  Recommendations and Planned Interventions:  [x]           Bed Mobility Training             []    Neuromuscular Re-Education  [x]           Transfer Training                   []    Orthotic/Prosthetic Training  [x]           Gait Training                         []    Modalities  [x]           Therapeutic Exercises           []    Edema Management/Control  [x]           Therapeutic Activities            [x]    Patient and Family Training/Education  []           Other (comment):    Frequency/Duration: Patient will be followed by physical therapy  5 times a week to address goals. Discharge Recommendations: To Be Determined  Further Equipment Recommendations for Discharge: TBD     SUBJECTIVE:   Patient stated I'm not Carl Rubio do it right now.     OBJECTIVE DATA SUMMARY:   HISTORY:    Past Medical History:   Diagnosis Date    Acute [facility]     CAD (coronary artery disease)     COPD     [facility] ([facility]     Emphysema     Essential hypertension     Gastrointestinal disorder     ulcer    Hyperkalemia     Hyperlipemia     Hypertension      Past Surgical History:   Procedure Laterality Date    CORONARY STENT SINGLE W/PTCA      HX CORONARY STENT PLACEMENT      HX HEART CATHETERIZATION       Prior Level of Function/Home Situation: Independent community ambulator while living alone. She uses no AD typically and reports using a cart within grocery stores. She's I with ADL's/IADL's. She reports 4-5 falls in the past year. She reports independence with pill box management. She is not on home O2. She has smoked for 40 years and is now down to 1/2 PPD.    Personal factors and/or comorbidities impacting plan of care:     Home Situation  Home Environment: Private residence  One/Two Story Residence: One story  Living Alone: Yes  Support Systems: Family member(s)  Patient Expects to be Discharged to[de-identified] Private residence  Current DME Used/Available at Home: Nebulizer    EXAMINATION/PRESENTATION/DECISION MAKING:   Critical Behavior:  Neurologic State: Alert  Orientation Level: Appropriate for age  Cognition: Appropriate decision making  Safety/Judgement: Awareness of environment  Hearing: Auditory  Auditory Impairment: None  Range Of Motion:  AROM: Generally decreased, functional                       Strength:    Strength: Generally decreased, functional                    Tone & Sensation:                  Sensation: Intact (denies neuropathy)             Functional Mobility:  Bed Mobility:  Rolling: Modified independent (bed rail use)           Functional Measure:  Barthel Index:    Bathin  Bladder: 0 (reports frequent stress incontinence)  Bowels: 10  Groomin  Dressin  Feeding: 10  Mobility: 0  Stairs: 0  Toilet Use: 0  Transfer (Bed to Chair and Back): 0  Total: 30       Barthel and G-code impairment scale:  Percentage of impairment CH  0% CI  1-19% CJ  20-39% CK  40-59% CL  60-79% CM  80-99% CN  100%   Barthel Score 0-100 100 99-80 79-60 59-40 20-39 1-19   0   Barthel Score 0-20 20 17-19 13-16 9-12 5-8 1-4 0      The Barthel ADL Index: Guidelines  1. The index should be used as a record of what a patient does, not as a record of what a patient could do. 2. The main aim is to establish degree of independence from any help, physical or verbal, however minor and for whatever reason. 3. The need for supervision renders the patient not independent. 4. A patient's performance should be established using the best available evidence. Asking the patient, friends/relatives and nurses are the usual sources, but direct observation and common sense are also important. However direct testing is not needed. 5. Usually the patient's performance over the preceding 24-48 hours is important, but occasionally longer periods will be relevant. 6. Middle categories imply that the patient supplies over 50 per cent of the effort.   7. Use of aids to be independent is allowed. Lakshmi Godoy., Barthel, D.W. (3481). Functional evaluation: the Barthel Index. [address] (14)2. JIGAR Augustin, Dima Amaya., Dungrodjuanita Locke. Kimo, [address] (1999). Measuring the change indisability after inpatient rehabilitation; comparison of the responsiveness of the Barthel Index and Functional [place] Measure. Journal of Neurology, Neurosurgery, and Psychiatry, 66(4), 415-248. ROSS Bird, ANNE Gill, & Matthew Hutchison M.A. (2004.) Assessment of post-stroke quality of life in cost-effectiveness studies: The usefulness of the Barthel Index and the EuroQoL-5D. Quality of Life Research, 13, 193-56         G codes: In compliance with CMSs Claims Based Outcome Reporting, the following G-code set was chosen for this patient based on their primary functional limitation being treated: The outcome measure chosen to determine the severity of the functional limitation was the Barthel with a score of 30/100 which was correlated with the impairment scale. ? Mobility - Walking and Moving Around:     - CURRENT STATUS: CL - 60%-79% impaired, limited or restricted    - GOAL STATUS: CK - 40%-59% impaired, limited or restricted    - D/C STATUS:  ---------------To be determined---------------     Pain:  Pain Scale 1: Numeric (0 - 10)  Pain Intensity 1: 5  Pain Location 1: Head  Pain Orientation 1: Anterior  Pain Description 1: Aching  Pain Intervention(s) 1: Medication (see MAR)  Activity Tolerance:   Self limiting; VSS RA     Please refer to the flowsheet for vital signs taken during this treatment.   After treatment:   []         Patient left in no apparent distress sitting up in chair  [x]         Patient left in no apparent distress in bed  [x]         Call bell left within reach  [x]         Nursing notified  []         Caregiver present  []         Bed alarm activated    COMMUNICATION/EDUCATION:   The patients plan of care was discussed with: Registered Nurse. [x]         Fall prevention education was provided and the patient/caregiver indicated understanding. [x]         Patient/family have participated as able in goal setting and plan of care. [x]         Patient/family agree to work toward stated goals and plan of care. []         Patient understands intent and goals of therapy, but is neutral about his/her participation. []         Patient is unable to participate in goal setting and plan of care.     Thank you for this referral.  Mike Duncan, PT, DPT, CEEAA      Time Calculation: 13 mins

## 2018-02-17 PROBLEM — F13.10 BENZODIAZEPINE ABUSE (HCC): Status: ACTIVE | Noted: 2018-01-01

## 2018-02-17 NOTE — PROGRESS NOTES
Hospitalist Progress Note    NAME: Osiel Pineda   :  1945   MRN:  [id number]   LOS:   2      Assessment / Plan:  Acute COPD exacerbation due to upper respiratory tract infection  -wheezing improving, no O2 needs but continues to have coughing   -will continue IV steroids  -switch to xopenex due to tachycardia  -azithromycin  -mucinex  -needs refills for inhalers and needs new PCP on discharge  -nicotine patch    Clonazepam abuse  -per friend who patient lives with patient has history of abusing clonazepam, patient uses it to [employer] would fill prescription and finish it in 2-3 days, she has not been taking in in the last 3 months, despite what the patient has said to nursing  -will stop clonazepam    Paroxysmal atrial fibrillation  -likely in setting of acute illness  -started aspirin, echo unremarkable  -increased metoprolol    Diarrhea unclear cause r/o infectious etiology  -follow stool studies    CAD, HTN, HLD  -continue asprin, BB, statin     Underweight  Body mass index is 18.84 kg/(m^2). Code Status: dnr  Surrogate Decision Maker: [place] Siddiquifred Osler  DVT Prophylaxis: heparin     Subjective:     Chief Complaint: sob  Patient lethargic, coughing    Objective:     VITALS:   Last 24hrs VS reviewed since prior progress note. Most recent are:  Patient Vitals for the past 24 hrs:   Temp Pulse Resp BP SpO2   18 1145 99.4 °F (37.4 °C) 90 20 142/75 99 %   18 0833 97.6 °F (36.4 °C) 98 20 148/83 99 %   18 0504 98.1 °F (36.7 °C) 92 18 154/77 97 %   18 0140 - - - - 96 %   18 0012 98 °F (36.7 °C) 82 18 109/78 98 %   18 2018 - - - - 99 %   18 1927 98.1 °F (36.7 °C) 93 20 120/62 98 %   18 1559 97.9 °F (36.6 °C) 96 20 127/62 97 %     No intake or output data in the 24 hours ending 18 1537     PHYSICAL EXAM:  General: Alert, cachectic appearing cooperative, no acute distress    EENT:  EOMI. Anicteric sclerae.  Mucous membranes moist  Resp:  CTA bilaterally, exp wheezing decreased airflow No accessory muscle use  CV:  Regular rhythm, no edema  GI:  Soft, non distended, non tender. +Bowel sounds  Neurologic:  Sleepy today, normal speech  Psych:   Good insight, not anxious nor agitated  Skin:  No rashes, no jaundice  ________________________________________________________________________  Care Plan discussed with:    Comments   Patient x    Family  x Spoke with patient's landlord/friend   RN x    Care Manager     Consultant                        Multidiciplinary team rounds were held today with , nursing, pharmacist and clinical coordinator. Patient's plan of care was discussed; medications were reviewed and discharge planning was addressed. ________________________________________________________________________  Total NON critical care TIME:  25   Minutes    >50% of visit spent in counseling and coordination of care       ________________________________________________________________________    Procedures: see electronic medical records for all procedures/Xrays and details which were not copied into this note but were reviewed prior to creation of Plan. LABS:  I reviewed today's most current labs and imaging studies.   Pertinent labs include:  Recent Labs      02/16/18   0409  02/15/18   1119   WBC  8.2  10.3   HGB  9.0*  10.4*   HCT  28.8*  33.2*   PLT  426*  509*     Recent Labs      02/16/18   0409  02/15/18   1119   NA  142  141   K  3.3*  3.5   CL  114*  112*   CO2  16*  18*   GLU  168*  137*   BUN  29*  38*   CREA  0.83  1.10*   CA  8.2*  9.1   ALB   --   3.3*   TBILI   --   0.4   SGOT   --   48*   ALT   --   49       Signed: Shanda Abdul MD

## 2018-02-17 NOTE — PROGRESS NOTES
PCU SHIFT NURSING NOTE      Bedside and Verbal shift change report given to Nicolás Bello RN (oncoming nurse) by Chino León (offgoing nurse). Report included the following information SBAR, Kardex, ED Summary, Procedure Summary, Intake/Output, MAR, Recent Results, Med Rec Status, Cardiac Rhythm NSR and Alarm Parameters . Shift Summary: 1927 Bedside report received at this time. Patient observed in bed at this time watching tv. Patient voiced no c/o pain or discomfort at this time. Patient tolerated meds without difficulty. Patient voiced c/o generalized pain. PRN medication given per patient request.     Patient observed sleeping throughout the shift. No difficulty breathing or coughing noted. Admission Date 2/15/2018   Admission Diagnosis Acute exacerbation of chronic obstructive pulmonary disease (COPD) ([facility] [address]   Consults IP CONSULT TO HOSPITALIST  IP CONSULT TO CARDIOLOGY        Consults   []PT   []OT   []Speech   []Case Management      [] Palliative      Cardiac Monitoring Order   []Yes   []No     IV drips   []Yes    Drip:                            Dose:  Drip:                            Dose:  Drip:                            Dose:   []No     GI Prophylaxis   []Yes   []No         DVT Prophylaxis   SCDs:             Gerhard stockings:         [] Medication   []Contraindicated   []None      Activity Level Activity Level: Up with Assistance     Activity Assistance: Partial (one person)   Purposeful Rounding every 1-2 hour? []Yes   Powell Score  Total Score: 2   Bed Alarm (If score 3 or >)   []Yes   [] Refused (See signed refusal form in chart)   Sal Score  Sal Score: 19   Sal Score (if score 14 or less)   []PMT consult   []Wound Care consult      []Specialty bed   [] Nutrition consult          Needs prior to discharge:   Home O2 required:    []Yes   []No    If yes, how much O2 required?     Other:    Last Bowel Movement: Last Bowel Movement Date: 02/15/18      Influenza Vaccine Received Flu Vaccine for Current Season (usually Sept-March): Yes        Pneumonia Vaccine           Diet Active Orders   Diet    DIET CARDIAC Regular      LDAs               Peripheral IV 08/02/17 Right Arm (Active)       Peripheral IV 02/15/18 Left Antecubital (Active)   Site Assessment Clean, dry, & intact 2/16/2018  7:27 PM   Phlebitis Assessment 0 2/16/2018  7:27 PM   Infiltration Assessment 0 2/16/2018  7:27 PM   Dressing Status Clean, dry, & intact 2/16/2018  7:27 PM   Dressing Type Tape;Transparent 2/16/2018  7:27 PM   Hub Color/Line Status Pink;Capped 2/16/2018  7:27 PM   Action Taken Open ports on tubing capped 2/16/2018  7:27 PM   Alcohol Cap Used Yes 2/16/2018  7:27 PM                      Urinary Catheter      Intake & Output   Date 02/16/18 0700 - 02/17/18 0659 02/17/18 0700 - 02/18/18 0659   Shift 1572-6555 2414-1639 24 Hour Total 9074-8593 4537-5731 24 Hour Total   I  N  T  A  K  E   Shift Total  (mL/kg)         O  U  T  P  U  T   Urine  (mL/kg/hr)            Urine Occurrence(s) 1 x  1 x       Shift Total  (mL/kg)         NET         Weight (kg) 46.7 46.7 46.7 46.7 46.7 46.7         Readmission Risk Assessment Tool Score Medium Risk            20       Total Score        3 Has Seen PCP in Last 6 Months (Yes=3, No=0)    9 Pt. Coverage (Medicare=5 , Medicaid, or Self-Pay=4)    8 Charlson Comorbidity Score (Age + Comorbid Conditions)        Criteria that do not apply:    . Living with Significant Other. Assisted Living. LTAC. SNF.  or   Rehab    Patient Length of Stay (>5 days = 3)    IP Visits Last 12 Months (1-3=4, 4=9, >4=11)       Expected Length of Stay - - -   Actual Length of Stay 2

## 2018-02-17 NOTE — PROGRESS NOTES
TRANSFER - IN REPORT:    Verbal report received from Wendie(name) on Mirian Medrano  being received from PCU(unit) for routine progression of care      Report consisted of patients Situation, Background, Assessment and   Recommendations(SBAR). Information from the following report(s) SBAR, Kardex, Intake/Output, MAR, Accordion, Recent Results and Med Rec Status was reviewed with the receiving nurse. Opportunity for questions and clarification was provided. Assessment completed upon patients arrival to unit and care assumed.

## 2018-02-17 NOTE — PROGRESS NOTES
Oncology Nursing Communication Tool  6:56 PM  2/17/2018     Bedside shift change report given to Deena Briggs RN (incoming nurse) by Mello Gregory RN (outgoing nurse) on [facility]. Report included the following information SBAR and Kardex. Shift Summary: telesitter in room. Bed alarm in place, but patient is not heavy enough to activate it. Issues for physician to address: discharge tomorrow         Oncology Shift Note   Admission Date 2/15/2018   Admission Diagnosis Acute exacerbation of chronic obstructive pulmonary disease (COPD) ([facility] [address]   Code Status DNR   Consults IP CONSULT TO HOSPITALIST  IP CONSULT TO CARDIOLOGY      Cardiac Monitoring [] Yes [] No      Purposeful Hourly Rounding [] Yes    Sreedhar Score Total Score: 4   Sreedhar score 3 or > [] Bed Alarm [] Avasys [] 1:1 sitter [] Patient refused (Place signed refusal form in chart)      Pain Managed [] Yes [] No    Key Pain Meds             oxyCODONE IR (ROXICODONE) 10 mg tab immediate release tablet  (Taking) Take 10 mg by mouth every four (4) hours as needed for Pain. ASPIRIN/ACETAMINOPHEN/CAFFEINE (EXCEDRIN EXTRA STRENGTH PO)  (Taking) Take 1 Tab by mouth daily as needed for Pain. Influenza Vaccine Received Flu Vaccine for Current Season (usually Sept-March): Yes           Oxygen needs?  [] Room air Oxygen @  []1L    []2L    []3L   []4L    []5L   []6L     Use home O2? [] Yes [] No  Perform O2 challenge test using  smartphrase (.oxygenchallenge)      Last bowel movement Last Bowel Movement Date: 02/15/18  bowel movement      Urinary Catheter             LDAs               Peripheral IV 08/02/17 Right Arm (Active)       Peripheral IV 02/15/18 Right Antecubital (Active)   Site Assessment Clean, dry, & intact 2/17/2018  3:50 PM   Phlebitis Assessment 0 2/17/2018  3:50 PM   Infiltration Assessment 0 2/17/2018  3:50 PM   Dressing Status Clean, dry, & intact 2/17/2018  3:50 PM   Dressing Type Transparent 2/17/2018  3:50 PM Hub Color/Line Status Pink;Capped 2/17/2018  3:50 PM   Action Taken Open ports on tubing capped 2/16/2018  7:27 PM   Alcohol Cap Used Yes 2/16/2018  7:27 PM                         Readmission Risk Assessment Tool Score Medium Risk            20       Total Score        3 Has Seen PCP in Last 6 Months (Yes=3, No=0)    9 Pt. Coverage (Medicare=5 , Medicaid, or Self-Pay=4)    8 Charlson Comorbidity Score (Age + Comorbid Conditions)        Criteria that do not apply:    . Living with Significant Other. Assisted Living. LTAC. SNF.  or   Rehab    Patient Length of Stay (>5 days = 3)    IP Visits Last 12 Months (1-3=4, 4=9, >4=11)       Expected Length of Stay - - -   Actual Length of Stay 2          Kirt Franco RN

## 2018-02-17 NOTE — PROGRESS NOTES
ADULT PROTOCOL: JET AEROSOL ASSESSMENT    Patient  Basia Martinez     74 y.o.   female     2/16/2018  9:58 PM    Breath Sounds Pre Procedure: Right Breath Sounds: Diminished                               Left Breath Sounds: Diminished    Breath Sounds Post Procedure: Right Breath Sounds: Diminished                                 Left Breath Sounds: Diminished    Breathing pattern: Pre procedure Breathing Pattern: Regular          Post procedure Breathing Pattern: Regular    Heart Rate: Pre procedure Pulse: 94           Post procedure Pulse: 92    Resp Rate: Pre procedure Respirations: 20           Post procedure Respirations: 20            Cough: Pre procedure Cough: Non-productive               Post procedure Cough: Non-productive      Oxygen: O2 Device: Room air        Changed: NO    SpO2: Pre procedure SpO2: 99 %   without oxygen              Post procedure SpO2: 97 %  without oxygen    Nebulizer Therapy: Current medications Aerosolized Medications: Xopenex      Changed: NO    Smoking History:    Smoking status: Current Every Day Smoker       Packs/day: 0.50         Problem List:   Patient Active Problem List   Diagnosis Code    Anxiety and depression F41.8    Postsurgical percutaneous transluminal coronary angioplasty status Z98.61    Tobacco use disorder F17.200    Old myocardial infarction I25.2    Essential hypertension, benign I10    Coronary atherosclerosis of native coronary artery I25.10    Mixed hyperlipidemia E78.2    Acute myocardial infarction of other inferior wall, subsequent episode of care I21.19    Multiple bruises T07. 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

## 2018-02-18 NOTE — PROGRESS NOTES
Pharmacy Automatic Renal Dosing Protocol - Antimicrobials    Indication for Antimicrobials: CAP not improving w/ Azithromycin     Current Regimen of Each Antimicrobial:  Cefepime 1gm IV q8h (Start Date ; Day # 1)    Previous Antimicrobial Therapy:  Azithromycin -     Significant Cultures:   2/15: Blood: ng x3 days (pending)    Paralysis, amputations, malnutrition:     Labs:  Recent Labs      18   0409   CREA  0.83   BUN  29*   WBC  8.2     Temp (24hrs), Av.1 °F (36.7 °C), Min:97.3 °F (36.3 °C), Max:99 °F (37.2 °C)      Creatinine Clearance (mL/min) or Dialysis: 44  No results found for: PCT    Impression/Plan:   · Change Cefepime to 2gm IV q12h which is appropriate for indication & renal function  · Steroids increased  · Antimicrobial stop date TBD     Pharmacy will follow daily and adjust medications as appropriate for renal function and/or serum levels. Thank you,  Alok Quintero [facility]          Recommended duration of therapy  http://[facility]/[place]/[facility]/Pharmacy/Clinical%20Companion/Duration%20of%20ABX%20therapy. docx    Renal Dosing  http://[facility]/[place]/[facility]/Pharmacy/Clinical%20Companion/Renal%[id number]. pdf

## 2018-02-18 NOTE — ROUTINE PROCESS
Pt asleep & tachy in the 140s-150s at 2210. MD notified & orders given for lopressor 2.5mg IV. Pt medicated as per orders at 2236. Pt still tachy into the 170s. Dr. Theodore Rodriguez notified of continued tachycardia & orders received to transfer pt for titratable Dilt drip. Nursing supervisor notified. 0100--report called to Amanda Kemp, on PCU at 2030. EKG done. Afib. Pt transferred to PCU in hospital bed at [place] [address]. Pt's purse, clothes, & cell phone sent with her.

## 2018-02-18 NOTE — PROGRESS NOTES
TRANSFER - IN REPORT:    Verbal report received from Ewa Cannon (name) on [facility]  being received from Oncology (unit) for change in patient condition(Rapid A. FIB needing cardizem gtt)      Report consisted of patients Situation, Background, Assessment and   Recommendations(SBAR). Information from the following report(s) SBAR, Kardex, Intake/Output, Recent Results and Cardiac Rhythm A. FIB with RVR was reviewed with the receiving nurse. Opportunity for questions and clarification was provided. Assessment completed upon patients arrival to unit and care assumed. Primary Nurse Rajeev Canada RN and April Vargas RN performed a dual skin assessment on this patient No impairment noted  Sal score is 17      PCU SHIFT NURSING NOTE    Shift Summary:     9894: Patient arrives to unit. A.FIB with RVR at 151. No other complaints verbalized. Patient restless. Alert to name and place, and minimal situation. Re-orientation given. Patient heart rate and rhythm between NSR 95 bpm and A. FIB RVR >150. Cardizem gtt started at 5 mg. Will continue to monitor. Bed alarm and monitoring system in place for patients safety. Will continue to monitor. Admission Date 2/15/2018   Admission Diagnosis Acute exacerbation of chronic obstructive pulmonary disease (COPD) ([facility] [address]   Consults IP CONSULT TO HOSPITALIST  IP CONSULT TO CARDIOLOGY        Consults   []PT   []OT   []Speech   []Case Management      [] Palliative      Cardiac Monitoring Order   []Yes   []No     IV drips   []Yes    Drip:                            Dose:  Drip:                            Dose:  Drip:                            Dose:   []No     GI Prophylaxis   []Yes   []No         DVT Prophylaxis   SCDs:  Sequential Compression Device: Bilateral          Gerhard stockings:         [] Medication   []Contraindicated   []None      Activity Level Activity Level:  Up with Assistance     Activity Assistance: Partial (one person)   Purposeful Rounding every 1-2 hour? []Yes   Powell Score  Total Score: 4   Bed Alarm (If score 3 or >)   []Yes   [] Refused (See signed refusal form in chart)   Sal Score  Sal Score: 18   Sal Score (if score 14 or less)   []PMT consult   []Wound Care consult      []Specialty bed   [] Nutrition consult          Needs prior to discharge:   Home O2 required:    []Yes   []No    If yes, how much O2 required? Other:    Last Bowel Movement: Last Bowel Movement Date: 02/15/18      Influenza Vaccine Received Flu Vaccine for Current Season (usually Sept-March): Yes        Pneumonia Vaccine           Diet Active Orders   Diet    DIET CARDIAC Regular      LDAs               Peripheral IV 08/02/17 Right Arm (Active)       Peripheral IV 02/17/18 Left Forearm (Active)   Site Assessment Clean, dry, & intact 2/18/2018 12:48 AM   Phlebitis Assessment 0 2/18/2018 12:48 AM   Infiltration Assessment 0 2/18/2018 12:48 AM   Dressing Status Clean, dry, & intact 2/18/2018 12:48 AM   Dressing Type Tape;Transparent 2/18/2018 12:48 AM   Hub Color/Line Status Blue;Flushed 2/18/2018 12:48 AM                      Urinary Catheter      Intake & Output        Readmission Risk Assessment Tool Score Medium Risk            20       Total Score        3 Has Seen PCP in Last 6 Months (Yes=3, No=0)    9 Pt. Coverage (Medicare=5 , Medicaid, or Self-Pay=4)    8 Charlson Comorbidity Score (Age + Comorbid Conditions)        Criteria that do not apply:    . Living with Significant Other. Assisted Living. LTAC. SNF.  or   Rehab    Patient Length of Stay (>5 days = 3)    IP Visits Last 12 Months (1-3=4, 4=9, >4=11)       Expected Length of Stay - - -   Actual Length of Stay 3

## 2018-02-18 NOTE — PROGRESS NOTES
Cardiology Progress Note            [address]  [phone]    2/18/2018 12:08 PM    Admit Date: 2/15/2018    Admit Diagnosis: Acute exacerbation of chronic obstructive pulmonary disease*    Subjective:     Lakia Gonzalez   denies chest pain.     Visit Vitals    /68    Pulse 98    Temp 97.3 °F (36.3 °C)    Resp 20    Ht 5' 2\" (1.575 m)    Wt 103 lb (46.7 kg)    SpO2 100%    BMI 18.84 kg/m2     Current Facility-Administered Medications   Medication Dose Route Frequency    clonazePAM (KlonoPIN) tablet 0.5 mg  0.5 mg Oral BID    [START ON 2/19/2018] metoprolol succinate (TOPROL-XL) XL tablet 50 mg  50 mg Oral DAILY    nicotine (NICODERM CQ) 14 mg/24 hr patch 1 Patch  1 Patch TransDERmal DAILY    rOPINIRole (REQUIP) tablet 0.25 mg  0.25 mg Oral QHS PRN    dilTIAZem (CARDIZEM) 125 mg in dextrose 5% 125 mL infusion  0-15 mg/hr IntraVENous TITRATE    methylPREDNISolone (PF) (SOLU-MEDROL) injection 40 mg  40 mg IntraVENous Q12H    azithromycin (ZITHROMAX) tablet 500 mg  500 mg Oral DAILY    levalbuterol (XOPENEX) nebulizer soln 1.25 mg/3 mL  1.25 mg Nebulization Q6H RT    aspirin chewable tablet 81 mg  81 mg Oral DAILY    HYDROcodone-acetaminophen (NORCO) 5-325 mg per tablet 1 Tab  1 Tab Oral Q4H PRN    pravastatin (PRAVACHOL) tablet 80 mg  80 mg Oral DAILY    sodium chloride (NS) flush 5-10 mL  5-10 mL IntraVENous Q8H    sodium chloride (NS) flush 5-10 mL  5-10 mL IntraVENous PRN    acetaminophen (TYLENOL) tablet 650 mg  650 mg Oral Q6H PRN    ondansetron (ZOFRAN) injection 4 mg  4 mg IntraVENous Q6H PRN    docusate sodium (COLACE) capsule 100 mg  100 mg Oral DAILY PRN    heparin (porcine) injection 5,000 Units  5,000 Units SubCUTAneous Q8H    guaiFENesin ER (MUCINEX) tablet 600 mg  600 mg Oral Q12H    guaiFENesin (ROBITUSSIN) 100 mg/5 mL oral liquid 100 mg  100 mg Oral TID PRN         Objective:      Visit Vitals    /68    Pulse 98    Temp 97.3 °F (36.3 °C)    Resp 20    Ht 5' 2\" (1.575 m)    Wt 103 lb (46.7 kg)    SpO2 100%    BMI 18.84 kg/m2       Physical Exam:  Abdomen: soft, non-tender  Extremities: extremities normal  Heart: regular rate and rhythm  Lungs: clear to auscultation bilaterally  Pulses: 2+ and symmetric    Data Review:   Labs:    Recent Labs      02/16/18   0409   WBC  8.2   HGB  9.0*   HCT  28.8*   PLT  426*     Recent Labs      02/16/18   0409   NA  142   K  3.3*   CL  114*   CO2  16*   GLU  168*   BUN  29*   CREA  0.83   CA  8.2*       Recent Labs      02/16/18   0850  02/15/18   1939   TROIQ  0.06*  0.06*       No intake or output data in the 24 hours ending 02/18/18 1208     Telemetry: nsr with short bursts of atrial ectopy    Assessment:     Active Problems:    Acute exacerbation of chronic obstructive pulmonary disease (COPD) ([address] [address] (2/15/2018)      Benzodiazepine abuse (2/17/2018)      Acute MI ()        Plan:     Patricia Arora is doing well. She has short bursts of atrial ectopy - not unexpected in a copd exacerbation. Cont med rx for copd.  Dr German Cushing to see in the am.    Dena Rivera MD, [facility]    2/18/2018

## 2018-02-18 NOTE — PROGRESS NOTES
Spoke with Dr. Annelise Connors regarding report given from receiving nurse, Latisha Mckinney regarding the discontinuation of clonazepam and patients increasing agitation. Receiving nurse reported that medication was discontinued due to family expressing concern about patients overuse of medication at home. Latisha Mckinney RN stated that patient has become increasingly restless since change of her shift. Dr. Annelise Connors ordered clonazepam 0.5 mg BID and to give dosage to patient once patient arrives to unit.

## 2018-02-18 NOTE — PROGRESS NOTES
Hospitalist Progress Note    NAME: Lakia Gonzalez   :  1945   MRN:  [id number]   LOS:   3      Assessment / Plan:  Acute COPD exacerbation due to upper respiratory tract infection  -slow to improve, still has significant wheezing and poor air flow  -will continue IV steroids  -switch to xopenex due to tachycardia  -broaden abx to cefepime, check sputum culture  -mucinex  -needs refills for inhalers and needs new PCP on discharge  -nicotine patch    Acute encephalopathy  Clonazepam abuse  -per friend who patient lives with patient has history of abusing clonazepam, patient uses it to [employer] high\" would fill prescription and finish it in 2-3 days  -she has not been taking this for the last 3 months at home, in an agreement with her landlord, kip says if she is given a prescription for this she can not live there  -patient has \"opposite\" reaction to benzos, makes her very confused, \"high\" per friend  -seems patient has become more confused and agitated since started clonazapam here will stop  -try haldol prn agitation    Paroxysmal atrial fibrillation  -likely in setting of acute illness  -started aspirin, echo unremarkable  -increased metoprolol, required dilt PRN    Diarrhea unclear cause r/o infectious etiology  -follow stool studies    CAD, HTN, HLD  -continue asprin, BB, statin     Underweight  Body mass index is 18.84 kg/(m^2). Code Status: dnr  Surrogate Decision Maker: Niece Ankush Surendra  DVT Prophylaxis: heparin     Subjective:     Chief Complaint: sob  More confused today, had RVR overnight started on dilt drip but off after only few hours    Objective:     VITALS:   Last 24hrs VS reviewed since prior progress note.  Most recent are:  Patient Vitals for the past 24 hrs:   Temp Pulse Resp BP SpO2   18 1114 97.3 °F (36.3 °C) 98 20 144/68 100 %   18 0839 - - - - 99 %   18 0819 - 89 - - 100 %   18 0817 98 °F (36.7 °C) 88 20 133/69 98 %   18 0600 - 85 - 126/66 97 % 02/18/18 0500 - 88 - 114/66 98 %   02/18/18 0300 98 °F (36.7 °C) 85 20 96/50 97 %   02/18/18 0208 - 97 - 129/69 -   02/18/18 0135 - - - - 99 %   02/18/18 0101 - 100 - 122/85 99 %   02/18/18 0048 97.9 °F (36.6 °C) (!) 151 20 123/87 99 %   02/17/18 2314 97.7 °F (36.5 °C) (!) 150 20 119/81 96 %   02/17/18 2253 - (!) 143 - 122/87 -   02/17/18 2248 - 78 20 119/78 96 %   02/17/18 2236 - (!) 129 - (!) 146/97 -   02/17/18 1917 98.1 °F (36.7 °C) 100 18 150/72 98 %   02/17/18 1743 - 95 - 159/86 -   02/17/18 1550 - - - - 98 %   02/17/18 1520 99 °F (37.2 °C) 92 18 153/87 99 %     No intake or output data in the 24 hours ending 02/18/18 1428     PHYSICAL EXAM:  General: Alert, cachectic appearing cooperative, no acute distress    EENT:  EOMI. Anicteric sclerae. Mucous membranes moist  Resp:  Wheezing, poor airflow  CV:  Regular rhythm, no edema  GI:  Soft, non distended, non tender. +Bowel sounds  Neurologic:  Sleepy today, following commands, speech slow  Psych:   Poor insight, confused  Skin:  No rashes, no jaundice  ________________________________________________________________________  Care Plan discussed with:    Comments   Patient x    Family      RN x    Care Manager     Consultant                        Multidiciplinary team rounds were held today with , nursing, pharmacist and clinical coordinator. Patient's plan of care was discussed; medications were reviewed and discharge planning was addressed. ________________________________________________________________________  Total NON critical care TIME:  25   Minutes    >50% of visit spent in counseling and coordination of care       ________________________________________________________________________    Procedures: see electronic medical records for all procedures/Xrays and details which were not copied into this note but were reviewed prior to creation of Plan. LABS:  I reviewed today's most current labs and imaging studies.   Pertinent labs include:  Recent Labs      02/16/18   0409   WBC  8.2   HGB  9.0*   HCT  28.8*   PLT  426*     Recent Labs      02/16/18   0409   NA  142   K  3.3*   CL  114*   CO2  16*   GLU  168*   BUN  29*   CREA  0.83   CA  8.2*       Signed: Aimee Castillo MD

## 2018-02-18 NOTE — PROGRESS NOTES
Care Management:    Fatmata Erazo to talk with patient today about a new PCP and she was tired and confused and unable to talk about wanting a new PCP . I left a list of PCP's at her bedside. Maybe once she is no longer confused she may not want to change PCP's.      [id number]

## 2018-02-19 NOTE — PROGRESS NOTES
PCU SHIFT NURSING NOTE      Bedside verbal shift change report given to Nagi Nassar (oncoming nurse) by Sofia Gardiner (offgoing nurse). Report included the following information SBAR, Kardex, Intake/Output, Recent Results and Cardiac Rhythm NSR-ST. Shift Summary:   1935: Patient restless, agitated, attempting to remove telemetry monitor. Alert to name and place. Re-orienation given as needed. Video monitoring system in place. Patient continues to set off alarms in attempt to get out of bed. Safety interventions in place. Patient yells, \"help\" and when asked with patient needs, patient states, \"I dont know. \" Oral liquids offered and refused. Refuses to eat dinner tray. Therapeutic meaures to promote rest and comfort given. 2030: Patient continues to get out of bed, setting off alarms. Patient yells, \"Help\" and request ice-cream, which was given. Will sit with patient. Admission Date 2/15/2018   Admission Diagnosis Acute exacerbation of chronic obstructive pulmonary disease (COPD) ([facility] [address]   Consults IP CONSULT TO HOSPITALIST  IP CONSULT TO CARDIOLOGY        Consults   []PT   []OT   []Speech   []Case Management      [] Palliative      Cardiac Monitoring Order   []Yes   []No     IV drips   []Yes    Drip:                            Dose:  Drip:                            Dose:  Drip:                            Dose:   []No     GI Prophylaxis   []Yes   []No         DVT Prophylaxis   SCDs:  Sequential Compression Device: Bilateral     Patient Refused VTE Prophylaxis: Yes    Gerhard stockings:         [] Medication   []Contraindicated   []None      Activity Level Activity Level: Up with Assistance     Activity Assistance: Partial (one person)   Purposeful Rounding every 1-2 hour?    []Yes   Powell Score  Total Score: 5   Bed Alarm (If score 3 or >)   []Yes   [] Refused (See signed refusal form in chart)   Sal Score  Sal Score: 17   Sal Score (if score 14 or less)   []PMT consult   []Wound Care consult      []Specialty bed   [] Nutrition consult          Needs prior to discharge:   Home O2 required:    []Yes   []No    If yes, how much O2 required? Other:    Last Bowel Movement: Last Bowel Movement Date: 02/15/18 (patient unsure/documentation noted this date)      Influenza Vaccine Received Flu Vaccine for Current Season (usually Sept-March): Yes        Pneumonia Vaccine           Diet Active Orders   Diet    DIET CARDIAC Regular      LDAs               Peripheral IV 08/02/17 Right Arm (Active)       Peripheral IV 02/17/18 Left Forearm (Active)   Site Assessment Clean, dry, & intact 2/18/2018  7:35 PM   Phlebitis Assessment 0 2/18/2018  7:35 PM   Infiltration Assessment 0 2/18/2018  7:35 PM   Dressing Status Clean, dry, & intact 2/18/2018  7:35 PM   Dressing Type Tape;Transparent 2/18/2018  7:35 PM   Hub Color/Line Status Blue; Infusing 2/18/2018  7:35 PM                      Urinary Catheter      Intake & Output   Date 02/17/18 1900 - 02/18/18 0659 02/18/18 0700 - 02/19/18 0659   Shift 2223-9888 24 Hour Total 7509-5003 8945-8745 24 Hour Total   I  N  T  A  K  E   Shift Total  (mL/kg)        O  U  T  P  U  T   Urine  (mL/kg/hr)           Urine Occurrence(s) 1 x 1 x       Shift Total  (mL/kg)        NET        Weight (kg) 46.7 46.7 46.7 46.7 46.7         Readmission Risk Assessment Tool Score Medium Risk            20       Total Score        3 Has Seen PCP in Last 6 Months (Yes=3, No=0)    9 Pt. Coverage (Medicare=5 , Medicaid, or Self-Pay=4)    8 Charlson Comorbidity Score (Age + Comorbid Conditions)        Criteria that do not apply:    . Living with Significant Other. Assisted Living. LTAC. SNF.  or   Rehab    Patient Length of Stay (>5 days = 3)    IP Visits Last 12 Months (1-3=4, 4=9, >4=11)       Expected Length of Stay 2d 14h   Actual Length of Stay 3

## 2018-02-19 NOTE — PROGRESS NOTES
Report given to Jessica Wilson RN by formerly [facility] RN. SBAR, Kardex, ED Summary, Procedure Summary, Intake/Output, MAR, Accordion, Recent Results or Alarm Parameters  was discussed. Jessica Wilson RN assumed care of the pt.    4784: Pt received laying in bed with no gown on. Pt attempting to move from bed to chair without assistance. Told pt to please call us if she needs assistance ambulating. 1603: Pt pulled out IV access. PT to work with patient. Will attempt to place new IV when PT is finished. 1703: New IV placed. PRN zofran given d/t pt c/o nausea. 1810: Pt sleeping    1831: TRANSFER - OUT REPORT:    Verbal report given to Yamilet CAMACHO(name) on Rita Ibarra  being transferred to [facility](unit) for routine progression of care       Report consisted of patients Situation, Background, Assessment and   Recommendations(SBAR). Information from the following report(s) SBAR, Kardex, ED Summary, Procedure Summary, Intake/Output, MAR, Accordion, Recent Results and Alarm Parameters  was reviewed with the receiving nurse. Lines:   Peripheral IV 08/02/17 Right Arm (Active)       Peripheral IV 02/19/18 Right;Posterior Forearm (Active)   Site Assessment Clean, dry, & intact 2/19/2018  4:00 PM   Phlebitis Assessment 0 2/19/2018  4:00 PM   Infiltration Assessment 0 2/19/2018  4:00 PM   Dressing Status Clean, dry, & intact 2/19/2018  4:00 PM   Dressing Type Tape;Transparent 2/19/2018  4:00 PM   Hub Color/Line Status Blue; Infusing 2/19/2018  4:00 PM   Action Taken Open ports on tubing capped 2/19/2018  4:00 PM        Opportunity for questions and clarification was provided.       Patient transported with:   Registered Nurse  Tech

## 2018-02-19 NOTE — PROGRESS NOTES
Problem: Mobility Impaired (Adult and Pediatric)  Goal: *Acute Goals and Plan of Care (Insert Text)  Physical Therapy Goals  Initiated 2/16/2018  1. Patient will move from supine to sit and sit to supine , scoot up and down and roll side to side in bed with independence within 7 day(s). Remainder of goals to be set upon further assessment      physical Therapy TREATMENT  Seen 1553 to 1617      Patient: Radha Cleaning (29 y.o. female)  Date: 2/19/2018   Diagnosis: Acute exacerbation of chronic obstructive pulmonary disease (COPD) ([facility] [address] <principal problem not specified>  Precautions: Contact, Fall, Bed Alarm  Chart, physical therapy assessment, plan of care and goals were reviewed. ASSESSMENT:  Patient cleared by nurse to be seen by PT this afternoon. Patient was agreeable to working with PT. She had a tele-sitter. She has had some confusion. She had taken all of her clothes off. Assisted getting on a gown. Noted to have a significant spinal curvature with the convexity to the right. Good sitting balance at bedside and in the chair. Stood with min assist.  Ambulated 30' in room with min HHA x 2. Up in chair at end of session. Chair alarm in place. Got patient some ice cream.  Nurse aware that patient is up in the chair. May need some rehab at discharge, depending on progress. Progression toward goals:  []    Improving appropriately and progressing toward goals  [x]    Improving slowly and progressing toward goals  []    Not making progress toward goals and plan of care will be adjusted     PLAN:  Patient continues to benefit from skilled intervention to address the above impairments. Continue treatment per established plan of care. Discharge Recommendations: To Be Determined (depends on progress)  Further Equipment Recommendations for Discharge:  TBD (depends on progress)     SUBJECTIVE:   Patient stated I'm cold.   Can I have some ice cream?    OBJECTIVE DATA SUMMARY:   Critical Behavior:  Neurologic State: (P) Confused  Orientation Level: (P) Oriented to person, Oriented to place  Cognition: (P) Decreased attention/concentration, Poor safety awareness, Decreased command following  Safety/Judgement: Awareness of environment     Functional Mobility Training:  Bed Mobility:  Rolling: Modified independent  Supine to Sit: Minimum assistance  Scooting: Contact guard assistance     Transfers:  Sit to Stand: Minimum assistance  Stand to Sit: Minimum assistance        Balance:  Sitting: Impaired  Sitting - Static: Good (unsupported)  Sitting - Dynamic: Fair (occasional)    Ambulation/Gait Training:  Distance (ft): 30 Feet (ft)  Assistive Device: Gait belt  Ambulation - Level of Assistance: Minimal assistance;Assist x2  Gait Abnormalities: Decreased step clearance; Path deviations;Trunk sway increased     Pain:  Pain Scale 1: Numeric (0 - 10)  Pain Intensity 1: 0  Pain Location 1: Back     Pain Description 1: Aching  Pain Intervention(s) 1: Medication (see MAR)     Activity Tolerance: Tolerated PT treatment session fairly well. Please refer to the flowsheet for vital signs taken during this treatment.   After treatment:   [x]    Patient left in no apparent distress sitting up in chair  []    Patient left in no apparent distress in bed  [x]    Call bell left within reach  [x]    Nursing notified  []    Caregiver present  [x]    Bed alarm activated    COMMUNICATION/COLLABORATION:   The patients plan of care was discussed with: Registered Nurse    Suni Neville, PT  Time Calculation: 24 mins

## 2018-02-19 NOTE — PROGRESS NOTES
Hospitalist Progress Note    NAME: Rinku Ray   :  1945   MRN:  [id number]   LOS:   4      Assessment / Plan:  Acute COPD exacerbation due to upper respiratory tract infection  -slow to improve, still has significant wheezing and poor air flow  -will continue IV steroids  -switch to xopenex due to tachycardia  -broaden abx to cefepime, check sputum culture  -mucinex  -needs refills for inhalers and needs new PCP on discharge  -nicotine patch    Acute encephalopathy likely hospital delirium  Clonazepam abuse  -mental status worsened after started PO clonazepam here  -per family patient has history of abusing clonazepam, patient uses it to \"get high\" would fill 90 day prescription and finish it in 2-3 days  -she has not been taking this for the last 3 months at home, in an agreement with her landlord  -try haldol prn agitation  -if mental status not improving may need neuro or psych input    Paroxysmal atrial fibrillation  -likely in setting of acute illness, now back in NSR  -started aspirin, echo unremarkable  -increased metoprolol, required dilt PRN  -transfer to telemetry, off of dilt drip now    Diarrhea resolved    CAD, HTN, HLD  -continue asprin, BB, statin     Code Status: dnr  Surrogate Decision Maker: Eliza Ng  DVT Prophylaxis: heparin     Subjective:     Chief Complaint: sob  Still confused today, answers \"i don't know\" to all questions    Objective:     VITALS:   Last 24hrs VS reviewed since prior progress note.  Most recent are:  Patient Vitals for the past 24 hrs:   Temp Pulse Resp BP SpO2   18 1612 - 87 - 140/70 -   18 1600 - 86 - 141/86 96 %   18 1554 - (!) 107 - 143/87 -   18 1300 98.3 °F (36.8 °C) 94 18 147/60 97 %   18 0826 98.2 °F (36.8 °C) 95 20 151/72 98 %   18 0614 - - - - 97 %   18 0341 98.7 °F (37.1 °C) (!) 101 20 142/73 97 %   18 2222 98 °F (36.7 °C) (!) 103 18 141/84 97 %   18 - - - - 98 %   18 98.5 °F (36.9 °C) (!) 102 22 157/74 99 %       Intake/Output Summary (Last 24 hours) at 02/19/18 1652  Last data filed at 02/19/18 1106   Gross per 24 hour   Intake              260 ml   Output              400 ml   Net             -140 ml        PHYSICAL EXAM:  General: Alert, cachectic appearing cooperative, no acute distress    EENT:  EOMI. Anicteric sclerae. Mucous membranes moist  Resp:  Wheezing, poor airflow  CV:  Regular rhythm, no edema  GI:  Soft, non distended, non tender. +Bowel sounds  Neurologic:  Sleepy today, following commands, speech slow  Psych:   Poor insight, confused  Skin:  No rashes, no jaundice  ________________________________________________________________________  Care Plan discussed with:    Comments   Patient x    Family      RN     Care Manager     Consultant                        Multidiciplinary team rounds were held today with , nursing, pharmacist and clinical coordinator. Patient's plan of care was discussed; medications were reviewed and discharge planning was addressed. ________________________________________________________________________  Total NON critical care TIME:  25   Minutes    >50% of visit spent in counseling and coordination of care       ________________________________________________________________________    Procedures: see electronic medical records for all procedures/Xrays and details which were not copied into this note but were reviewed prior to creation of Plan. LABS:  I reviewed today's most current labs and imaging studies. Pertinent labs include:  No results for input(s): WBC, HGB, HCT, PLT, HGBEXT, HCTEXT, PLTEXT, HGBEXT, HCTEXT, PLTEXT in the last 72 hours.   Recent Labs      02/19/18   0344  02/18/18   1456   NA  141  138   K  4.1  3.7   CL  110*  107   CO2  21  25   GLU  128*  148*   BUN  34*  41*   CREA  0.78  0.97   CA  8.7  8.6       Signed: Fannie Alvarenga MD

## 2018-02-19 NOTE — CDMP QUERY
The medical record notes a dx. of Encephalopathy. Can you please further specify the type of Encephalopathy as:    => Toxic Encephalopathy in the setting of abuse of Clonazepam, noted with increased confusion, and agitation, requiring stopping the clonazepam & PRN Haldol.    => Other explanation of clinical findings  => Unable to determine (no explanation for clinical findings)    The medical record reflects the following clinical findings, treatment, and risk factors. Risk Factors:  Report abuse to the clonazepam,  HTN,   Clinical Indicators: \"seems more confused and agitated since starting the clonazapam    Treatment: stopping the clonazepam and ativan prn    Please clarify and document your clinical opinion in the progress notes and discharge summary including the definitive and/or presumptive diagnosis, (suspected or probable), related to the above clinical findings. Please include clinical findings supporting your diagnosis.     Thank you,  YUSEF AmaralN, RN, [address]  [phone]

## 2018-02-19 NOTE — PROGRESS NOTES
TRANSFER - IN REPORT:    Verbal report received from Juliane Lundberg (name) on Ama Melgar  being received from PCU (unit) for routine progression of care      Report consisted of patients Situation, Background, Assessment and   Recommendations(SBAR). Information from the following report(s) SBAR was reviewed with the receiving nurse. Opportunity for questions and clarification was provided. Assessment completed upon patients arrival to unit and care assumed.

## 2018-02-19 NOTE — PROGRESS NOTES
2/19/2018 8:36 AM    Admit Date: 2/15/2018    Admit Diagnosis: Acute exacerbation of chronic obstructive pulmonary disease*    Subjective:     No cardiac events. Confused. Agitated overnight reportedly.     Visit Vitals    /73 (BP 1 Location: Right arm, BP Patient Position: At rest)    Pulse (!) 101    Temp 98.7 °F (37.1 °C)    Resp 20    Ht 5' 2\" (1.575 m)    Wt 103 lb (46.7 kg)    SpO2 97%    BMI 18.84 kg/m2     Current Facility-Administered Medications   Medication Dose Route Frequency    metoprolol succinate (TOPROL-XL) XL tablet 50 mg  50 mg Oral DAILY    methylPREDNISolone (PF) (SOLU-MEDROL) injection 40 mg  40 mg IntraVENous Q8H    haloperidol lactate (HALDOL) injection 2 mg  2 mg IntraVENous Q6H PRN    cefepime (MAXIPIME) 2 g in 0.9 %  mL IVPB  2 g IntraVENous Q12H    nicotine (NICODERM CQ) 14 mg/24 hr patch 1 Patch  1 Patch TransDERmal DAILY    levalbuterol (XOPENEX) nebulizer soln 1.25 mg/3 mL  1.25 mg Nebulization Q6H RT    aspirin chewable tablet 81 mg  81 mg Oral DAILY    HYDROcodone-acetaminophen (NORCO) 5-325 mg per tablet 1 Tab  1 Tab Oral Q4H PRN    pravastatin (PRAVACHOL) tablet 80 mg  80 mg Oral DAILY    sodium chloride (NS) flush 5-10 mL  5-10 mL IntraVENous Q8H    sodium chloride (NS) flush 5-10 mL  5-10 mL IntraVENous PRN    acetaminophen (TYLENOL) tablet 650 mg  650 mg Oral Q6H PRN    ondansetron (ZOFRAN) injection 4 mg  4 mg IntraVENous Q6H PRN    docusate sodium (COLACE) capsule 100 mg  100 mg Oral DAILY PRN    heparin (porcine) injection 5,000 Units  5,000 Units SubCUTAneous Q8H    guaiFENesin ER (MUCINEX) tablet 600 mg  600 mg Oral Q12H    guaiFENesin (ROBITUSSIN) 100 mg/5 mL oral liquid 100 mg  100 mg Oral TID PRN         Objective:      Visit Vitals    /73 (BP 1 Location: Right arm, BP Patient Position: At rest)    Pulse (!) 101    Temp 98.7 °F (37.1 °C)    Resp 20    Ht 5' 2\" (1.575 m)    Wt 103 lb (46.7 kg)    SpO2 97%  BMI 18.84 kg/m2       Physical Exam:  Abdomen: soft, non-tender. Bowel sounds normal.   Extremities: no cyanosis or edema  Heart: regular rate and rhythm, S1, S2 normal, no murmur, click, rub or gallop  Lungs: clear to auscultation bilaterally  Neurologic: awake, confused    Data Review:   Labs:    Recent Results (from the past 24 hour(s))   METABOLIC PANEL, BASIC    Collection Time: 02/18/18  2:56 PM   Result Value Ref Range    Sodium 138 136 - 145 mmol/L    Potassium 3.7 3.5 - 5.1 mmol/L    Chloride 107 97 - 108 mmol/L    CO2 25 21 - 32 mmol/L    Anion gap 6 5 - 15 mmol/L    Glucose 148 (H) 65 - 100 mg/dL    BUN 41 (H) 6 - 20 MG/DL    Creatinine 0.97 0.55 - 1.02 MG/DL    BUN/Creatinine ratio 42 (H) 12 - 20      GFR est AA >60 >60 ml/min/1.73m2    GFR est non-AA 56 (L) >60 ml/min/1.73m2    Calcium 8.6 8.5 - 24.4 MG/DL   METABOLIC PANEL, BASIC    Collection Time: 02/19/18  3:44 AM   Result Value Ref Range    Sodium 141 136 - 145 mmol/L    Potassium 4.1 3.5 - 5.1 mmol/L    Chloride 110 (H) 97 - 108 mmol/L    CO2 21 21 - 32 mmol/L    Anion gap 10 5 - 15 mmol/L    Glucose 128 (H) 65 - 100 mg/dL    BUN 34 (H) 6 - 20 MG/DL    Creatinine 0.78 0.55 - 1.02 MG/DL    BUN/Creatinine ratio 44 (H) 12 - 20      GFR est AA >60 >60 ml/min/1.73m2    GFR est non-AA >60 >60 ml/min/1.73m2    Calcium 8.7 8.5 - 10.1 MG/DL       Telemetry: normal sinus rhythm      Assessment:     Active Problems:    Acute exacerbation of chronic obstructive pulmonary disease (COPD) (HCC) (2/15/2018)      Benzodiazepine abuse (2/17/2018)      Acute MI ()        Plan:     1. Transient a. Fib: most likely due to underlying COPD and hypoxia. Normal LVEF. No further episodes. Monitor. 2. H/o CAD: last stent in 2009. Stable. Continue BB, statin. Aspirin.

## 2018-02-19 NOTE — PROGRESS NOTES
ADULT PROTOCOL: JET AEROSOL  REASSESSMENT    Patient  Mercedes Esposito     74 y.o.   female     2/18/2018  10:45 PM    Breath Sounds Pre Procedure: Right Breath Sounds: Diminished                               Left Breath Sounds: Diminished    Breath Sounds Post Procedure: Right Breath Sounds: Diminished                                 Left Breath Sounds: Diminished    Breathing pattern: Pre procedure Breathing Pattern: Regular          Post procedure Breathing Pattern: Regular    Heart Rate: Pre procedure Pulse: 99           Post procedure Pulse: 101    Resp Rate: Pre procedure Respirations: 18           Post procedure Respirations: 20      Cough: Pre procedure Cough: Congested               Post procedure Cough: Non-productive      Oxygen: O2 Device: Room air        Changed: NO    SpO2: Pre procedure SpO2: 98 %   without oxygen              Post procedure SpO2: 97 %  without oxygen    Nebulizer Therapy: Current medications Aerosolized Medications: Xopenex      Changed: NO    Smoking History:    Smoking status: Current Every Day Smoker         Packs/day: 0.50          Problem List:   Patient Active Problem List   Diagnosis Code    Anxiety and depression F41.8    Postsurgical percutaneous transluminal coronary angioplasty status Z98.61    Tobacco use disorder F17.200    Old myocardial infarction I25.2    Essential hypertension, benign I10    Coronary atherosclerosis of native coronary artery I25.10    Mixed hyperlipidemia E78.2    Acute myocardial infarction of other inferior wall, subsequent episode of care I21.19    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

## 2018-02-19 NOTE — PROGRESS NOTES
Pt up walking the room with unsteady gait, not following commands, telesitter present, haldol given for pt agitation,anxiety at 0950 1230 1430, attempts to reorient pt unsucessful

## 2018-02-20 NOTE — PROGRESS NOTES
Problem: Falls - Risk of  Goal: *Absence of Falls  Document Sreedhar Fall Risk and appropriate interventions in the flowsheet.    Outcome: Progressing Towards Goal  Fall Risk Interventions:  Mobility Interventions: Bed/chair exit alarm, Communicate number of staff needed for ambulation/transfer, OT consult for ADLs, Patient to call before getting OOB, PT Consult for mobility concerns, PT Consult for assist device competence, Strengthening exercises (ROM-active/passive), Utilize walker, cane, or other assitive device, Utilize gait belt for transfers/ambulation    Mentation Interventions: Adequate sleep, hydration, pain control, Bed/chair exit alarm, Door open when patient unattended, Evaluate medications/consider consulting pharmacy, Gait belt with transfers/ambulation, Increase mobility, More frequent rounding, Reorient patient, Room close to nurse's station, Self-releasing belt, Toileting rounds, Update white board    Medication Interventions: Bed/chair exit alarm, Evaluate medications/consider consulting pharmacy, Teach patient to arise slowly, Patient to call before getting OOB, Utilize gait belt for transfers/ambulation    Elimination Interventions: Bed/chair exit alarm, Call light in reach, Patient to call for help with toileting needs, Toileting schedule/hourly rounds    History of Falls Interventions: Bed/chair exit alarm, Consult care management for discharge planning, Door open when patient unattended, Evaluate medications/consider consulting pharmacy, Investigate reason for fall, Room close to nurse's station, Utilize gait belt for transfer/ambulation

## 2018-02-20 NOTE — PROGRESS NOTES
Physical Therapy  Attempting to see patient for PT this pm. Patient sleeping upon arrival. Patient opens eye to verbal stimulation but shakes head \"no\" that she does not want to get up with therapy and then drifted back to sleep. Will defer therapy today and follow back tomorrow.   Flora Lopez, PT

## 2018-02-20 NOTE — PROGRESS NOTES
Cm attempted to meet with pt to complete initial assessment, dc planning. Pt is extremely confused and drowsy at this time, appears to only be oriented to person. CM attempted to contact emergency contact listed on file, son Brett Blanchard). Pt's son reported that he and his 3 other siblings have not been in contact with pt for some time. Per MD note, mPOA listed is Adalberto Garcia (no contact information listed). Pt's son stated he did not know of any relative named Adalberto Garcia. CM attempted to verify pt's address and psychosocial history, pt's son unable to provide information at this time. CM contacted NP to inform of situation, will contact family as mPOA needs to be established.      DearKAMALJIT Randolph Supervisee in Social Work, [employer]  [phone]

## 2018-02-20 NOTE — PROGRESS NOTES
Hospitalist Progress Note    NAME: Xuan Weiss   :  1945   MRN:  [id number]       Interim Hospital Summary: 74 y.o. female whom presented on 2/15/2018 with      Assessment / Plan:  Chest Pain  Elevated troponin upon admission  Paroxysmal atrial fibrillation  - cardiology following; c/o mid sternal chest pain, no relive with NTG, but relieved after receiving 1mg of Morphine. Pt is sound asleep at present time. Following troponin, it was 0.06 on  and now down to less than 0.04. Recheck troponin at 6pm. Further cardiac evaluation per Dr. Jordan Cordero.  - continue with ASA and metoprolol  - pt was in NSR prior to chest pain, went to tachycardia, currently in junctional rhythm with rate of 80 (ID interval 0.1)    Acute COPD exacerbation due to upper respiratory tract infection  Hx of Tobacco use  - O2 Sat RA @ 95%   - will continue IV steroids  - continue with xopenex (was changed due to tachycardia)  - continue with cefepime, check sputum culture  - continue with mucinex (needs refills for inhalers and needs new PCP on discharge_  - continue with nicotine patch     Acute encephalopathy   HX Clonazepam abuse  - mental status worsened after started PO clonazepam here. According to the family patient has history of abusing clonazepam, patient uses it to \"get high\" would fill 90 day prescription and finish it in 2-3 days. The family who provide above information unable to locate. However, there was no family who has been closely following up on pt per CM. CM spoke with Deborah Tucker, pt 's son who claims that there was no close relationship with any of the children. At this point, no can provide information on her usual baseline mental state.   - Head CT, neuro consult  - haldol prn agitation  - will consider pscy consult if need  - continue w/ aspirin, echo unremarkable     Diarrhea resolved    CAD, HTN, HLD  -continue asprin, BB, statin      Code Status: dnr  Surrogate Decision Maker: Eliza Tristan Nicely (no contact information)  DVT Prophylaxis: heparin      Recommended Disposition: TBD     Subjective:     Chief Complaint / Reason for Physician Visit  \"my chest hurts. \"  Discussed with RN events overnight. Review of Systems:  Symptom Y/N Comments  Symptom Y/N Comments   Fever/Chills n   Chest Pain n Occurred around 12:30pm   Poor Appetite    Edema     Cough n   Abdominal Pain     Sputum    Joint Pain     SOB/HANDLEY n   Pruritis/Rash     Nausea/vomit n   Tolerating PT/OT     Diarrhea    Tolerating Diet     Constipation    Other       Could NOT obtain due to:      Objective:     VITALS:   Last 24hrs VS reviewed since prior progress note. Most recent are:  Patient Vitals for the past 24 hrs:   Temp Pulse Resp BP SpO2   02/20/18 1416 98.2 °F (36.8 °C) 88 18 119/59 98 %   02/20/18 1345 - - - - 95 %   02/20/18 1241 - - 18 141/78 100 %   02/20/18 1231 - - 16 (!) 110/93 95 %   02/20/18 1133 - - - - 90 %   02/20/18 1031 98.1 °F (36.7 °C) (!) 101 24 122/62 90 %   02/20/18 0705 97.2 °F (36.2 °C) 97 16 159/75 95 %   02/20/18 0309 97.6 °F (36.4 °C) 88 18 126/61 92 %   02/20/18 0221 - - - - 92 %   02/19/18 2225 97.8 °F (36.6 °C) 65 16 132/67 93 %   02/19/18 2142 - - - - 92 %   02/19/18 1922 97.3 °F (36.3 °C) (!) 106 18 138/75 93 %   02/19/18 1909 98.1 °F (36.7 °C) 97 18 140/86 94 %   02/19/18 1612 - 87 - 140/70 -   02/19/18 1600 - 86 - 141/86 96 %   02/19/18 1554 98.2 °F (36.8 °C) (!) 107 19 143/87 97 %       Intake/Output Summary (Last 24 hours) at 02/20/18 1519  Last data filed at 02/19/18 1600   Gross per 24 hour   Intake              100 ml   Output              350 ml   Net             -250 ml        PHYSICAL EXAM:  General: Ill appearing. Alert, not cooperative, was in acute distress during chest pain  EENT:  EOMI. Anicteric sclerae. MMM  Resp:  Coarse breath sound in apex with decreased breath sounds at bases, no wheezing.   No accessory muscle use  CV:  Regular  rhythm,  No edema  GI:  Soft, Non distended, Non tender.  +Bowel sounds  Neurologic:  Alert and oriented X 1, normal speech,   Psych:   Poor insight. get anxious and agitated easily  Skin:  No rashes. No jaundice    Reviewed most current lab test results and cultures  YES  Reviewed most current radiology test results   YES  Review and summation of old records today    NO  Reviewed patient's current orders and MAR    YES  PMH/SH reviewed - no change compared to H&P  ________________________________________________________________________  Care Plan discussed with:    Comments   Patient y    Family      RN y    Care Manager y    Consultant                       y Multidiciplinary team rounds were held today with , nursing, pharmacist and clinical coordinator. Patient's plan of care was discussed; medications were reviewed and discharge planning was addressed. ________________________________________________________________________  Total NON critical care TIME:  30  Minutes    Total CRITICAL CARE TIME Spent:   Minutes non procedure based      Comments   >50% of visit spent in counseling and coordination of care     ________________________________________________________________________  Lamount Goods, NP     Procedures: see electronic medical records for all procedures/Xrays and details which were not copied into this note but were reviewed prior to creation of Plan. LABS:  I reviewed today's most current labs and imaging studies. Pertinent labs include:  No results for input(s): WBC, HGB, HCT, PLT, HGBEXT, HCTEXT, PLTEXT in the last 72 hours.   Recent Labs      02/19/18   0344  02/18/18   1456   NA  141  138   K  4.1  3.7   CL  110*  107   CO2  21  25   GLU  128*  148*   BUN  34*  41*   CREA  0.78  0.97   CA  8.7  8.6       Signed: )Juliann Chand, NP

## 2018-02-20 NOTE — PROGRESS NOTES
Bedside and Verbal shift change report given to [facility] (oncoming nurse) by ginger (offgoing nurse). Report included the following information SBAR, Kardex, ED Summary, Procedure Summary, Intake/Output, MAR, Recent Results and Cardiac Rhythm nsr. SHIFT SUMMARY:  5810: pt's O2 sats 65% on RA. Pt lethargic and hands are cyanotic. Pt pulled up to sitting position in bed and placed on 3L NC to maintain sats of 90%. Pt had episode of chest pain. Cardiac workup performed. See note from akbar greco, charge rn.

## 2018-02-20 NOTE — PROGRESS NOTES
1225: Patient complains of chest pain radiating to left arm described as pressure pain. Notified [place] NP, order received. Will draw cardiac enzymes, perform EKG and give Nitro SL, primary care RN at bedside  1230: Performed EKG and blood draw, Nitro SL X3 given still c/o chest pain 10/10. NP at bedside  1310: Morphine given per NP, NP at bedside  1314: Patient resting comfortably in bed with eyes closed. 1:1 sitter at bedside. Dr. Fabienne Manriquez at patient's bedside, draw cardiac enzymes at 1800. Primary care RN at bedside.

## 2018-02-20 NOTE — PROGRESS NOTES
Patient arrived on unit from PCU. Situated in bed with tele sitter and bed alarm.  Patient wants to lie on abdomen

## 2018-02-20 NOTE — PROGRESS NOTES
2/20/2018 3:48 PM    Admit Date: 2/15/2018    Admit Diagnosis: Acute exacerbation of chronic obstructive pulmonary disease*    Subjective:     Patricia Arora reported chest pain earlier. Seen by hospitalist. Now sleeping after getting pain meds. No other complaints per nursing staff.      Visit Vitals    /59 (BP 1 Location: Right arm, BP Patient Position: At rest)    Pulse 88    Temp 98.2 °F (36.8 °C)    Resp 18    Ht 5' 2\" (1.575 m)    Wt 103 lb (46.7 kg)    SpO2 98%    BMI 18.84 kg/m2     Current Facility-Administered Medications   Medication Dose Route Frequency    nitroglycerin (NITROSTAT) tablet 0.4 mg  0.4 mg SubLINGual PRN    aluminum-magnesium hydroxide (MAALOX) oral suspension 15 mL  15 mL Oral DAILY PRN    morphine 4 mg/mL        ipratropium (ATROVENT) 0.02 % nebulizer solution 0.5 mg  0.5 mg Nebulization Q6H RT    metoprolol succinate (TOPROL-XL) XL tablet 50 mg  50 mg Oral DAILY    methylPREDNISolone (PF) (SOLU-MEDROL) injection 40 mg  40 mg IntraVENous Q8H    haloperidol lactate (HALDOL) injection 2 mg  2 mg IntraVENous Q6H PRN    cefepime (MAXIPIME) 2 g in 0.9 %  mL IVPB  2 g IntraVENous Q12H    nicotine (NICODERM CQ) 14 mg/24 hr patch 1 Patch  1 Patch TransDERmal DAILY    levalbuterol (XOPENEX) nebulizer soln 1.25 mg/3 mL  1.25 mg Nebulization Q6H RT    aspirin chewable tablet 81 mg  81 mg Oral DAILY    HYDROcodone-acetaminophen (NORCO) 5-325 mg per tablet 1 Tab  1 Tab Oral Q4H PRN    pravastatin (PRAVACHOL) tablet 80 mg  80 mg Oral DAILY    sodium chloride (NS) flush 5-10 mL  5-10 mL IntraVENous Q8H    sodium chloride (NS) flush 5-10 mL  5-10 mL IntraVENous PRN    acetaminophen (TYLENOL) tablet 650 mg  650 mg Oral Q6H PRN    ondansetron (ZOFRAN) injection 4 mg  4 mg IntraVENous Q6H PRN    docusate sodium (COLACE) capsule 100 mg  100 mg Oral DAILY PRN    heparin (porcine) injection 5,000 Units  5,000 Units SubCUTAneous Q8H    guaiFENesin ER (MUCINEX) tablet 600 mg  600 mg Oral Q12H    guaiFENesin (ROBITUSSIN) 100 mg/5 mL oral liquid 100 mg  100 mg Oral TID PRN         Objective:      Visit Vitals    /59 (BP 1 Location: Right arm, BP Patient Position: At rest)    Pulse 88    Temp 98.2 °F (36.8 °C)    Resp 18    Ht 5' 2\" (1.575 m)    Wt 103 lb (46.7 kg)    SpO2 98%    BMI 18.84 kg/m2       Physical Exam:  Abdomen: soft, non-tender. Bowel sounds normal.   Extremities: no cyanosis or edema  Heart: regular rate and rhythm, S1, S2 normal, no murmur, click, rub or gallop  Lungs: clear to auscultation bilaterally  Neurologic: sleepy. Data Review:   Labs:    Recent Results (from the past 24 hour(s))   EKG, 12 LEAD, INITIAL    Collection Time: 02/20/18 12:32 PM   Result Value Ref Range    Ventricular Rate 100 BPM    Atrial Rate 100 BPM    P-R Interval 88 ms    QRS Duration 68 ms    Q-T Interval 360 ms    QTC Calculation (Bezet) 464 ms    Calculated P Axis 52 degrees    Calculated R Axis -56 degrees    Calculated T Axis 77 degrees    Diagnosis       Sinus rhythm with short NH  Left axis deviation  Low voltage QRS  Inferior infarct (cited on or before 15-FEB-2018)  When compared with ECG of 15-FEB-2018 23:45,  Sinus rhythm has replaced Atrial fibrillation  Vent.  rate has decreased BY  75 BPM  ST no longer depressed in Inferior leads  ST no longer depressed in Anterolateral leads  Nonspecific T wave abnormality no longer evident in Inferior leads     TROPONIN I    Collection Time: 02/20/18 12:38 PM   Result Value Ref Range    Troponin-I, Qt. <0.04 <0.05 ng/mL   CK W/ CKMB & INDEX    Collection Time: 02/20/18 12:38 PM   Result Value Ref Range    CK 63 26 - 192 U/L    CK - MB 2.9 <3.6 NG/ML    CK-MB Index 4.6 (H) 0 - 2.5         Telemetry: normal sinus rhythm      Assessment:     Active Problems:    Acute exacerbation of chronic obstructive pulmonary disease (COPD) ([facility]) (2/15/2018)      Benzodiazepine abuse (2/17/2018)      Acute MI ()        Plan:     1. Transient a. Fib: most likely due to underlying COPD and hypoxia. Normal LVEF. No further episodes. Monitor. 2. H/o CAD: c/o chest pain earlier. No significant EKG changes. No significant enzymes. F/u troponin. If remains -ve then will get stress test tomorrow. Last stent in 2009. Continue BB, statin. Aspirin.

## 2018-02-20 NOTE — CONSULTS
DATE OF CONSULTATION: 2/20/2018    CONSULTED BY: Carl Chirinos MD    Chief Complaint   Patient presents with    Shortness of Breath    Vomiting       Reason for Consult  I have been asked to see the patient in neurological consultation to render advice and opinion regarding somnolence and confusion    HISTORY OF PRESENT ILLNESS  Patient was admitted for respiratory c/o. Since admission she has become more confused. She had been a regular hydrocodone user getting 1800mg of oxycodone every 26 days until January 19 th when she was given only 750 mg. She has not gotten Clonazepam since 8/31/17. ROS  Not obtainable  PM  Past Medical History:   Diagnosis Date    Acute MI     Benzodiazepine abuse     CAD (coronary artery disease)     COPD     Diabetes ([facility] [address]     Emphysema     Essential hypertension     Gastrointestinal disorder     ulcer    Hyperkalemia     Hyperlipemia     Hypertension        FH  No family history on file.     [facility]  Social History     Social History    Marital status: SINGLE     Spouse name: N/A    Number of children: N/A    Years of education: N/A     Social History Main Topics    Smoking status: Current Every Day Smoker     Packs/day: 0.50     Years: 0.00     Types: Cigarettes    Smokeless tobacco: Never Used    Alcohol use No    Drug use: No    Sexual activity: Not on file     Other Topics Concern    Not on file     Social History Narrative       ALLERGIES  Allergies   Allergen Reactions    Iodinated Contrast- Oral And Iv Dye Anaphylaxis    Penicillin G Hives    Ciprofloxacin Unable to Obtain    Lyrica [Pregabalin] Other (comments)     lossing balance       PHYSICAL EXAM  EXAMINATION:   Patient Vitals for the past 24 hrs:   Temp Pulse Resp BP SpO2   02/20/18 1416 98.2 °F (36.8 °C) 88 18 119/59 98 %   02/20/18 1345 - - - - 95 %   02/20/18 1241 - - 18 141/78 100 %   02/20/18 1231 - - 16 (!) 110/93 95 %   02/20/18 1133 - - - - 90 %   02/20/18 1031 98.1 °F (36.7 °C) (!) 101 24 122/62 90 %   02/20/18 0705 97.2 °F (36.2 °C) 97 16 159/75 95 %   02/20/18 0309 97.6 °F (36.4 °C) 88 18 126/61 92 %   02/20/18 0221 - - - - 92 %   02/19/18 2225 97.8 °F (36.6 °C) 65 16 132/67 93 %   02/19/18 2142 - - - - 92 %   02/19/18 1922 97.3 °F (36.3 °C) (!) 106 18 138/75 93 %   02/19/18 1909 98.1 °F (36.7 °C) 97 18 140/86 94 %        General:   Physical Exam   CONSTITUTIONAL: Oriented to person,. Head: Normocephalic and atraumatic. Neurological Examination:   Mental Status:  Somnolent, confused     Cranial Nerves: Yosi Greener PERRL, Extraocular movements are full. . Facial movement intact, symmetric. Hearing intact to conversation. Tongue midline. Motor: Strength is 5/5 in all 4 ext. Normal tone. Sensation: Normal to light touch    Reflexes: DTRs 2+ throughout. Plantar responses downgoing. Coordination/Cerebellar: NT    Gait: NT       LAB DATA REVIEWED:    Results for orders placed or performed during the hospital encounter of 02/15/18   CULTURE, BLOOD, PAIRED   Result Value Ref Range    Special Requests: NO SPECIAL REQUESTS      Culture result: NO GROWTH 5 DAYS     CBC WITH AUTOMATED DIFF   Result Value Ref Range    WBC 10.3 3.6 - 11.0 K/uL    RBC 4.47 3.80 - 5.20 M/uL    HGB 10.4 (L) 11.5 - 16.0 g/dL    HCT 33.2 (L) 35.0 - 47.0 %    MCV 74.3 (L) 80.0 - 99.0 FL    MCH 23.3 (L) 26.0 - 34.0 PG    MCHC 31.3 30.0 - 36.5 g/dL    RDW 19.8 (H) 11.5 - 14.5 %    PLATELET 293 (H) 684 - 400 K/uL    MPV 9.8 8.9 - 12.9 FL    NRBC 0.2 (H) 0  WBC    ABSOLUTE NRBC 0.02 (H) 0.00 - 0.01 K/uL    NEUTROPHILS 84 (H) 32 - 75 %    LYMPHOCYTES 8 (L) 12 - 49 %    MONOCYTES 8 5 - 13 %    EOSINOPHILS 0 0 - 7 %    BASOPHILS 0 0 - 1 %    IMMATURE GRANULOCYTES 1 (H) 0.0 - 0.5 %    ABS. NEUTROPHILS 8.7 (H) 1.8 - 8.0 K/UL    ABS. LYMPHOCYTES 0.8 0.8 - 3.5 K/UL    ABS. MONOCYTES 0.8 0.0 - 1.0 K/UL    ABS. EOSINOPHILS 0.0 0.0 - 0.4 K/UL    ABS. BASOPHILS 0.0 0.0 - 0.1 K/UL    ABS. IMM.  GRANS. 0.1 (H) 0.00 - 0.04 K/UL    DF AUTOMATED     METABOLIC PANEL, COMPREHENSIVE   Result Value Ref Range    Sodium 141 136 - 145 mmol/L    Potassium 3.5 3.5 - 5.1 mmol/L    Chloride 112 (H) 97 - 108 mmol/L    CO2 18 (L) 21 - 32 mmol/L    Anion gap 11 5 - 15 mmol/L    Glucose 137 (H) 65 - 100 mg/dL    BUN 38 (H) 6 - 20 MG/DL    Creatinine 1.10 (H) 0.55 - 1.02 MG/DL    BUN/Creatinine ratio 35 (H) 12 - 20      GFR est AA 59 (L) >60 ml/min/1.73m2    GFR est non-AA 49 (L) >60 ml/min/1.73m2    Calcium 9.1 8.5 - 10.1 MG/DL    Bilirubin, total 0.4 0.2 - 1.0 MG/DL    ALT (SGPT) 49 12 - 78 U/L    AST (SGOT) 48 (H) 15 - 37 U/L    Alk.  phosphatase 196 (H) 45 - 117 U/L    Protein, total 8.2 6.4 - 8.2 g/dL    Albumin 3.3 (L) 3.5 - 5.0 g/dL    Globulin 4.9 (H) 2.0 - 4.0 g/dL    A-G Ratio 0.7 (L) 1.1 - 2.2     LACTIC ACID   Result Value Ref Range    Lactic acid 1.7 0.4 - 2.0 MMOL/L   CK W/ CKMB & INDEX   Result Value Ref Range     (H) 26 - 192 U/L    CK - MB 19.2 (H) <3.6 NG/ML    CK-MB Index 5.4 (H) 0 - 2.5     NT-PRO BNP   Result Value Ref Range    NT pro-BNP 3659 (H) 0 - 125 PG/ML   TROPONIN I   Result Value Ref Range    Troponin-I, Qt. <0.04 <0.05 ng/mL   URINALYSIS W/ REFLEX CULTURE   Result Value Ref Range    Color YELLOW/STRAW      Appearance CLEAR CLEAR      Specific gravity 1.024 1.003 - 1.030      pH (UA) 5.5 5.0 - 8.0      Protein 300 (A) NEG mg/dL    Glucose NEGATIVE  NEG mg/dL    Ketone NEGATIVE  NEG mg/dL    Bilirubin NEGATIVE  NEG      Blood MODERATE (A) NEG      Urobilinogen 0.2 0.2 - 1.0 EU/dL    Nitrites NEGATIVE  NEG      Leukocyte Esterase NEGATIVE  NEG      WBC 0-4 0 - 4 /hpf    RBC 5-10 0 - 5 /hpf    Epithelial cells FEW FEW /lpf    Bacteria NEGATIVE  NEG /hpf    UA:UC IF INDICATED CULTURE NOT INDICATED BY UA RESULT CNI     TROPONIN I   Result Value Ref Range    Troponin-I, Qt. 0.06 (H) <6.19 ng/mL   METABOLIC PANEL, BASIC   Result Value Ref Range    Sodium 142 136 - 145 mmol/L    Potassium 3.3 (L) 3.5 - 5.1 mmol/L    Chloride 114 (H) 97 - 108 mmol/L    CO2 16 (L) 21 - 32 mmol/L    Anion gap 12 5 - 15 mmol/L    Glucose 168 (H) 65 - 100 mg/dL    BUN 29 (H) 6 - 20 MG/DL    Creatinine 0.83 0.55 - 1.02 MG/DL    BUN/Creatinine ratio 35 (H) 12 - 20      GFR est AA >60 >60 ml/min/1.73m2    GFR est non-AA >60 >60 ml/min/1.73m2    Calcium 8.2 (L) 8.5 - 10.1 MG/DL   CBC WITH AUTOMATED DIFF   Result Value Ref Range    WBC 8.2 3.6 - 11.0 K/uL    RBC 3.90 3.80 - 5.20 M/uL    HGB 9.0 (L) 11.5 - 16.0 g/dL    HCT 28.8 (L) 35.0 - 47.0 %    MCV 73.8 (L) 80.0 - 99.0 FL    MCH 23.1 (L) 26.0 - 34.0 PG    MCHC 31.3 30.0 - 36.5 g/dL    RDW 19.6 (H) 11.5 - 14.5 %    PLATELET 911 (H) 363 - 400 K/uL    MPV 10.2 8.9 - 12.9 FL    NRBC 0.4 (H) 0  WBC    ABSOLUTE NRBC 0.03 (H) 0.00 - 0.01 K/uL    NEUTROPHILS 89 (H) 32 - 75 %    LYMPHOCYTES 6 (L) 12 - 49 %    MONOCYTES 4 (L) 5 - 13 %    EOSINOPHILS 0 0 - 7 %    BASOPHILS 0 0 - 1 %    IMMATURE GRANULOCYTES 1 (H) 0.0 - 0.5 %    ABS. NEUTROPHILS 7.3 1.8 - 8.0 K/UL    ABS. LYMPHOCYTES 0.5 (L) 0.8 - 3.5 K/UL    ABS. MONOCYTES 0.3 0.0 - 1.0 K/UL    ABS. EOSINOPHILS 0.0 0.0 - 0.4 K/UL    ABS. BASOPHILS 0.0 0.0 - 0.1 K/UL    ABS. IMM.  GRANS. 0.1 (H) 0.00 - 0.04 K/UL    DF SMEAR SCANNED      RBC COMMENTS ANISOCYTOSIS  1+       TROPONIN I   Result Value Ref Range    Troponin-I, Qt. 0.06 (H) <5.50 ng/mL   METABOLIC PANEL, BASIC   Result Value Ref Range    Sodium 138 136 - 145 mmol/L    Potassium 3.7 3.5 - 5.1 mmol/L    Chloride 107 97 - 108 mmol/L    CO2 25 21 - 32 mmol/L    Anion gap 6 5 - 15 mmol/L    Glucose 148 (H) 65 - 100 mg/dL    BUN 41 (H) 6 - 20 MG/DL    Creatinine 0.97 0.55 - 1.02 MG/DL    BUN/Creatinine ratio 42 (H) 12 - 20      GFR est AA >60 >60 ml/min/1.73m2    GFR est non-AA 56 (L) >60 ml/min/1.73m2    Calcium 8.6 8.5 - 31.1 MG/DL   METABOLIC PANEL, BASIC   Result Value Ref Range    Sodium 141 136 - 145 mmol/L    Potassium 4.1 3.5 - 5.1 mmol/L    Chloride 110 (H) 97 - 108 mmol/L    CO2 21 21 - 32 mmol/L    Anion gap 10 5 - 15 mmol/L    Glucose 128 (H) 65 - 100 mg/dL    BUN 34 (H) 6 - 20 MG/DL    Creatinine 0.78 0.55 - 1.02 MG/DL    BUN/Creatinine ratio 44 (H) 12 - 20      GFR est AA >60 >60 ml/min/1.73m2    GFR est non-AA >60 >60 ml/min/1.73m2    Calcium 8.7 8.5 - 10.1 MG/DL   TROPONIN I   Result Value Ref Range    Troponin-I, Qt. <0.04 <0.05 ng/mL   CK W/ CKMB & INDEX   Result Value Ref Range    CK 63 26 - 192 U/L    CK - MB 2.9 <3.6 NG/ML    CK-MB Index 4.6 (H) 0 - 2.5     GLUCOSE, POC   Result Value Ref Range    Glucose (POC) 157 (H) 65 - 100 mg/dL    Performed by Tim BRUCE(JEAN-CLAUDE)    GLUCOSE, POC   Result Value Ref Range    Glucose (POC) 144 (H) 65 - 100 mg/dL    Performed by Satish Hoff)    EKG, 12 LEAD, INITIAL   Result Value Ref Range    Ventricular Rate 114 BPM    Atrial Rate 114 BPM    P-R Interval 130 ms    QRS Duration 66 ms    Q-T Interval 328 ms    QTC Calculation (Bezet) 452 ms    Calculated P Axis 94 degrees    Calculated R Axis -4 degrees    Calculated T Axis 67 degrees    Diagnosis       Sinus tachycardia with premature atrial complexes with aberrant conduction  Right atrial enlargement  Possible Inferior infarct , age undetermined  Abnormal ECG  When compared with ECG of 02-AUG-2017 13:27,  aberrant conduction is now present      Confirmed by Gisele Peace ([id number]) on 2/15/2018 2:42:41 PM     EKG, 12 LEAD, SUBSEQUENT   Result Value Ref Range    Ventricular Rate 175 BPM    Atrial Rate 182 BPM    QRS Duration 72 ms    Q-T Interval 278 ms    QTC Calculation (Bezet) 474 ms    Calculated R Axis -27 degrees    Calculated T Axis -105 degrees    Diagnosis       Atrial fibrillation with rapid ventricular response  Low voltage QRS  Inferior infarct (cited on or before 02-AUG-2017)  Abnormal ECG  When compared with ECG of 15-FEB-2018 11:19,  Atrial fibrillation has replaced Sinus rhythm  Vent.  rate has increased BY  61 BPM    Confirmed by Gisele Peace ([id number]) on 2/16/2018 11:28:28 PM     EKG, 12 LEAD, INITIAL Result Value Ref Range    Ventricular Rate 100 BPM    Atrial Rate 100 BPM    P-R Interval 88 ms    QRS Duration 68 ms    Q-T Interval 360 ms    QTC Calculation (Bezet) 464 ms    Calculated P Axis 52 degrees    Calculated R Axis -56 degrees    Calculated T Axis 77 degrees    Diagnosis       Sinus rhythm with short TN  Left axis deviation  Low voltage QRS  Inferior infarct (cited on or before 15-FEB-2018)  When compared with ECG of 15-FEB-2018 23:45,  Sinus rhythm has replaced Atrial fibrillation  Vent. rate has decreased BY  75 BPM  ST no longer depressed in Inferior leads  ST no longer depressed in Anterolateral leads  Nonspecific T wave abnormality no longer evident in Inferior leads          Imaging review:  None    HOME MEDS  Prior to Admission Medications   Prescriptions Last Dose Informant Patient Reported? Taking? ASPIRIN/ACETAMINOPHEN/CAFFEINE (EXCEDRIN EXTRA STRENGTH PO) 2/14/2018 at Unknown time Self Yes Yes   Sig: Take 1 Tab by mouth daily as needed for Pain. clonazepam (KLONOPIN) 0.5 mg tablet 2/12/2018 at Unknown time Self Yes No   Sig: Take 0.5 mg by mouth four (4) times daily.    oxyCODONE IR (ROXICODONE) 10 mg tab immediate release tablet 2/12/2018 at Unknown time Self Yes Yes   Sig: Take 10 mg by mouth every four (4) hours as needed for Pain.   pravastatin (PRAVACHOL) 80 mg tablet 2/14/2018 at Unknown time Self No Yes   Sig: TAKE 1 TABLET BY MOUTH ONCE DAILY      Facility-Administered Medications: None       CURRENT MEDS  Current Facility-Administered Medications   Medication Dose Route Frequency    morphine 4 mg/mL        ipratropium (ATROVENT) 0.02 % nebulizer solution 0.5 mg  0.5 mg Nebulization Q6H RT    metoprolol succinate (TOPROL-XL) XL tablet 50 mg  50 mg Oral DAILY    methylPREDNISolone (PF) (SOLU-MEDROL) injection 40 mg  40 mg IntraVENous Q8H    cefepime (MAXIPIME) 2 g in 0.9 %  mL IVPB  2 g IntraVENous Q12H    nicotine (NICODERM CQ) 14 mg/24 hr patch 1 Patch  1 Patch TransDERmal DAILY    levalbuterol (XOPENEX) nebulizer soln 1.25 mg/3 mL  1.25 mg Nebulization Q6H RT    aspirin chewable tablet 81 mg  81 mg Oral DAILY    pravastatin (PRAVACHOL) tablet 80 mg  80 mg Oral DAILY    sodium chloride (NS) flush 5-10 mL  5-10 mL IntraVENous Q8H    heparin (porcine) injection 5,000 Units  5,000 Units SubCUTAneous Q8H    guaiFENesin ER (MUCINEX) tablet 600 mg  600 mg Oral Q12H       IMPRESSION:RECOMMENDATIONS:  I am not sure why she is somnolent, her neurologic exam is non focal, I would avoid benzodiazipines as you are, she should be quite tolerant of opiates. Already has CT head in AM w/o contrast ordered, will check EEG, if they are normal would continue as you are, she should clear up. Thank you very much for this consultation. We will follow up on the above studies and give further recommendations as indicated. Rayma Duane. Varney Favre MD  Neurologist    This note will not be viewable in [address]

## 2018-02-21 NOTE — PROGRESS NOTES
Bedside shift change report given to Tex Huerta (oncoming nurse) by Luis Grant (offgoing nurse). Report included the following information SBAR, Kardex, Intake/Output, MAR and Recent Results. SHIFT SUMMARY:            [facility] NURSING NOTE   Admission Date 2/15/2018   Admission Diagnosis Acute exacerbation of chronic obstructive pulmonary disease (COPD) ([address]   Consults IP CONSULT TO HOSPITALIST  IP CONSULT TO CARDIOLOGY  IP CONSULT TO NEUROLOGY      Cardiac Monitoring [x] Yes [] No      Purposeful Hourly Rounding [x] Yes    Sreedhar Score Total Score: 5   Sreedhar score 3 or > [x] Bed Alarm [] Avasys [x] 1:1 sitter [] Patient refused (Place signed refusal form in chart)   Sal Score Sal Score: 16   Sal score 14 or < [] PMT consult [] Wound Care consult    []  Specialty bed  [] Nutrition consult      Influenza Vaccine Received Flu Vaccine for Current Season (usually Sept-March): Yes           Oxygen needs? [x] Room air Oxygen @  []1L    []2L    []3L   []4L    []5L   []6L     Use home O2? [] Yes [x] No  Perform O2 challenge test using  smartphrase (.Homeoxygen)      Last bowel movement Last Bowel Movement Date: 02/19/18      Urinary Catheter             LDAs               Peripheral IV 08/02/17 Right Arm (Active)       Peripheral IV 02/19/18 Right;Posterior Forearm (Active)   Site Assessment Clean, dry, & intact 2/20/2018  7:40 PM   Phlebitis Assessment 0 2/20/2018  7:40 PM   Infiltration Assessment 0 2/20/2018  7:40 PM   Dressing Status Clean, dry, & intact; Clean 2/20/2018  7:40 PM   Dressing Type Transparent 2/20/2018  7:40 PM   Hub Color/Line Status Yellow; Infusing 2/20/2018  7:40 PM   Action Taken Open ports on tubing capped 2/19/2018  4:00 PM                         Readmission Risk Assessment Tool Score High Risk            23       Total Score        3 Has Seen PCP in Last 6 Months (Yes=3, No=0)    3 Patient Length of Stay (>5 days = 3)    9 Pt.  Coverage (Medicare=5 , Medicaid, or Self-Pay=4)    8 Charlson Comorbidity Score (Age + Comorbid Conditions)        Criteria that do not apply:    . Living with Significant Other. Assisted Living. LTAC. SNF.  or   Rehab    IP Visits Last 12 Months (1-3=4, 4=9, >4=11)       Expected Length of Stay 3d 19h   Actual Length of Stay 5

## 2018-02-21 NOTE — PROGRESS NOTES
ADULT PROTOCOL: JET AEROSOL  REASSESSMENT    Patient  Mercedes Esposito     74 y.o.   female     2/20/2018  10:32 PM    Breath Sounds Pre Procedure: Right Breath Sounds: Diminished                               Left Breath Sounds: Diminished    Breath Sounds Post Procedure: Right Breath Sounds: Diminished                                 Left Breath Sounds: Diminished    Breathing pattern: Pre procedure Breathing Pattern: Regular          Post procedure Breathing Pattern: Regular    Heart Rate: Pre procedure Pulse: 100           Post procedure Pulse: 95    Resp Rate: Pre procedure Respirations: 20           Post procedure Respirations: 18            Cough: Pre procedure Cough: Non-productive               Post procedure Cough: Non-productive    Oxygen: O2 Device: Room air        Changed: NO    SpO2: Pre procedure SpO2: 96 %   without oxygen              Post procedure SpO2: 95 %  without oxygen    Nebulizer Therapy: Current medications Aerosolized Medications: Ipratropium bromide, Xopenex      Changed: NO    Smoking History:    Smoking status: Current Every Day Smoker       Packs/day: 0.50       Years: 0.00       Types: Cigarettes         Problem List:   Patient Active Problem List   Diagnosis Code    Anxiety and depression F41.8    Postsurgical percutaneous transluminal coronary angioplasty status Z98.61    Tobacco use disorder F17.200    Old myocardial infarction I25.2    Essential hypertension, benign I10    Coronary atherosclerosis of native coronary artery I25.10    Mixed hyperlipidemia E78.2    Acute myocardial infarction of other inferior wall, subsequent episode of care I21.19    [facility]    Chronic ischemic heart disease I25.9    COPD with emphysema/chronic bronchitis with intermittent exacerbations J43.9    S/P angioplasty with stent--ISR RCA PCI Z95.9    Iatrogenic hypotension I95.89    Acute exacerbation of chronic obstructive pulmonary disease (COPD) ([facility]) J44.1    Benzodiazepine abuse F13.10    Acute MI I21.9       Respiratory Therapist: Rose Mejia, RT

## 2018-02-21 NOTE — PROGRESS NOTES
Hospitalist Progress Note    NAME: Brea Lehman   :  1945   MRN:  [id number]       Interim Hospital Summary: 74 y.o. female whom presented on 2/15/2018 with      Assessment / Plan:  Chest Pain  Elevated troponin upon admission  Paroxysmal atrial fibrillation  - cardiology following; no further chest pain. Last stent placement in . Stress test result pending  - continue with ASA and metoprolol  - remain in junctional rhythm with rate of 80 (OK interval 0.1)     Acute COPD exacerbation due to upper respiratory tract infection  Leukocytosis  Aspiration PNA  Hx of Tobacco use  - CXR: New to bilateral airspace disease likely represents pneumonia. Will treat like aspiration PNA. Continue with Cefepime and Flagyl  - O2 Sat RA @ 95%   - continue IV steroids  - continue with xopenex (was changed due to tachycardia)  - continue with mucinex (needs refills for inhalers and needs new PCP on discharge)  - continue with nicotine patch      Acute encephalopathy   HX Clonazepam abuse  - mental status worsened after started PO clonazepam here. - According to Anna Marie Vega (patient was renting the place from [facility] for the past one year), the patient is usually like to bake and was able to taking care of herself. Anna Marie Vega shared that pt has been has abusing clonazepam, taking frequent dose of Nyquil (claims that makes her calm down), and OTC sleeping pill. Anna Marie Vega requested not to share with the patient about disclosing this medication usage, \"I don't want any hard feelings. \" As far as Mimi's knowledge, pt has no living children who is very close to him. - Head CT:  No acute intracranial findings and no significant change since previous study. Chronic lacunar infarcts and white matter hypodensity consistent with chronic  small vessel ischemic change.   - neuro following  - haldol prn agitation  - will consider pscy consult if need  - continue w/ aspirin, echo unremarkable      Diarrhea resolved    CAD, HTN, HLD  -continue asprin, BB, statin      Code Status: dnr  Surrogate Decision Maker: Frantz Lopez (landlord for the past one year)    DVT Prophylaxis: heparin        Recommended Disposition: TBD       Subjective:     Chief Complaint / Reason for Physician Visit  Pt is confused, only orient to herself. Discussed with RN events overnight. Review of Systems:  Symptom Y/N Comments  Symptom Y/N Comments   Fever/Chills    Chest Pain     Poor Appetite    Edema     Cough    Abdominal Pain     Sputum    Joint Pain     SOB/HANDLEY    Pruritis/Rash     Nausea/vomit    Tolerating PT/OT     Diarrhea    Tolerating Diet     Constipation    Other       Could NOT obtain due to:      Objective:     VITALS:   Last 24hrs VS reviewed since prior progress note. Most recent are:  Patient Vitals for the past 24 hrs:   Temp Pulse Resp BP SpO2   02/21/18 0839 98.9 °F (37.2 °C) 84 18 130/58 96 %   02/21/18 0507 98.8 °F (37.1 °C) 90 18 148/56 93 %   02/21/18 0218 - - - - 94 %   02/21/18 0135 98.1 °F (36.7 °C) 94 16 117/71 97 %   02/20/18 2033 - - - - 96 %   02/20/18 1915 98.5 °F (36.9 °C) (!) 101 18 117/71 96 %       Intake/Output Summary (Last 24 hours) at 02/21/18 1442  Last data filed at 02/21/18 0358   Gross per 24 hour   Intake              100 ml   Output                0 ml   Net              100 ml        PHYSICAL EXAM:  General: Ill appearing. Alert, cooperative, no acute distress    EENT:  EOMI. Anicteric sclerae. MMM  Resp:  Coarse breath sound in apex and diminished at bases. No wheezing or rales. No accessory muscle use  CV:  Regular  rhythm,  No edema  GI:  Soft, Non distended, Non tender.  +Bowel sounds  Neurologic:  Alert and oriented X 1, normal speech,   Psych:   Poor insight. Not anxious nor agitated  Skin:  No rashes.   No jaundice    Reviewed most current lab test results and cultures  YES  Reviewed most current radiology test results   YES  Review and summation of old records today    NO  Reviewed patient's current orders and MAR    YES  PMH/SH reviewed - no change compared to H&P  ________________________________________________________________________  Care Plan discussed with:    Comments   Patient y    Family  y Arabella Macdonald [phone]   RN y    Care Manager y    Consultant                       y Multidiciplinary team rounds were held today with , nursing, pharmacist and clinical coordinator. Patient's plan of care was discussed; medications were reviewed and discharge planning was addressed. ________________________________________________________________________  Total NON critical care TIME:  30 Minutes    Total CRITICAL CARE TIME Spent:   Minutes non procedure based      Comments   >50% of visit spent in counseling and coordination of care     ________________________________________________________________________  Joceline Garcia NP     Procedures: see electronic medical records for all procedures/Xrays and details which were not copied into this note but were reviewed prior to creation of Plan. LABS:  I reviewed today's most current labs and imaging studies.   Pertinent labs include:  Recent Labs      02/21/18 0037   WBC  33.2*   HGB  10.2*   HCT  33.6*   PLT  527*     Recent Labs      02/21/18   0037  02/19/18   0344  02/18/18   1456   NA  137  141  138   K  3.8  4.1  3.7   CL  105  110*  107   CO2  24  21  25   GLU  124*  128*  148*   BUN  25*  34*  41*   CREA  0.85  0.78  0.97   CA  9.0  8.7  8.6   MG  2.1   --    --    ALB  2.5*   --    --    TBILI  0.5   --    --    SGOT  21   --    --    ALT  31   --    --        Signed: )Juliann Chand, NP

## 2018-02-21 NOTE — PROGRESS NOTES
Problem: Falls - Risk of  Goal: *Absence of Falls  Document Sreedhar Fall Risk and appropriate interventions in the flowsheet.    Outcome: Progressing Towards Goal  Fall Risk Interventions:  Mobility Interventions: Bed/chair exit alarm, Communicate number of staff needed for ambulation/transfer, Mechanical lift, OT consult for ADLs, Patient to call before getting OOB    Mentation Interventions: Adequate sleep, hydration, pain control, Bed/chair exit alarm, Door open when patient unattended, Evaluate medications/consider consulting pharmacy, Eyeglasses and hearing aids, Increase mobility, More frequent rounding, Reorient patient, Room close to nurse's station, Self-releasing belt, Toileting rounds, Update white board    Medication Interventions: Bed/chair exit alarm, Evaluate medications/consider consulting pharmacy, Patient to call before getting OOB, Teach patient to arise slowly    Elimination Interventions: Bed/chair exit alarm, Call light in reach, Elevated toilet seat, Patient to call for help with toileting needs, Toilet paper/wipes in reach, Toileting schedule/hourly rounds    History of Falls Interventions: Bed/chair exit alarm, Consult care management for discharge planning, Door open when patient unattended, Evaluate medications/consider consulting pharmacy, Investigate reason for fall, Room close to nurse's station

## 2018-02-21 NOTE — PROGRESS NOTES
Bedside and Verbal shift change report given to [facility] (oncoming nurse) by ginger (offgoing nurse). Report included the following information SBAR, Kardex, ED Summary, Procedure Summary, Intake/Output, MAR, Recent Results and Cardiac Rhythm nsr. SHIFT SUMMARY:  Pt needs sputum sample collected if possible.

## 2018-02-21 NOTE — PROGRESS NOTES
ADULT PROTOCOL: JET AEROSOL ASSESSMENT    Patient  Mikki Gan     74 y.o.   female     2/21/2018  10:34 AM    Breath Sounds Pre Procedure: Right Breath Sounds: Diminished                               Left Breath Sounds: Diminished    Breath Sounds Post Procedure: Right Breath Sounds: Diminished                                 Left Breath Sounds: Diminished    Breathing pattern: Pre procedure Breathing Pattern: Regular          Post procedure Breathing Pattern: Regular    Heart Rate: Pre procedure Pulse: 80           Post procedure Pulse: 92    Resp Rate: Pre procedure Respirations: 16           Post procedure Respirations: 16            Cough: Pre procedure Cough: Non-productive               Post procedure Cough: Non-productive      Oxygen: O2 Device: Room air   room air     Changed: NO    SpO2: Pre procedure SpO2: 94 %   without oxygen              Post procedure SpO2: 95 %  without oxygen    Nebulizer Therapy: Current medications Aerosolized Medications: Ipratropium bromide, Xopenex      Changed: NO    Smoking History: current smoker    Problem List:   Patient Active Problem List   Diagnosis Code    Anxiety and depression F41.8    Postsurgical percutaneous transluminal coronary angioplasty status Z98.61    Tobacco use disorder F17.200    Old myocardial infarction I25.2    Essential hypertension, benign I10    Coronary atherosclerosis of native coronary artery I25.10    Mixed hyperlipidemia E78.2    Acute myocardial infarction of other inferior wall, subsequent episode of care I21.19    Multiple bruises T07. 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

## 2018-02-21 NOTE — PROGRESS NOTES
Initial Nutrition Assessment:    INTERVENTIONS/RECOMMENDATIONS:   · If pt seems too lethargic or confused for PO intake consider SLP consult  · Regular diet due to poor PO intake  · Glucerna TID once diet advanced    ASSESSMENT:   Chart reviewed, medically noted for Acute encephalopathy and PMH shown below. Pt was off the floor during visit attempt. RN reports poor PO intake and likely would not be able to get detail nutrition history due to confusion. Assessing pt due to low BMI. Unable to determine cause of low BMI at this time however ER notes documents pt experience N/V/D with poor PO intake x1 week PTA. COPD may be another factor.      Past Medical History:   Diagnosis Date    Acute MI     Benzodiazepine abuse     CAD (coronary artery disease)     COPD     Diabetes ([facility] [address]     Emphysema     Essential hypertension     Gastrointestinal disorder     ulcer    Hyperkalemia     Hyperlipemia     Hypertension        Diet Order: NPO  % Eaten:  Patient Vitals for the past 72 hrs:   % Diet Eaten   02/19/18 0826 0 %   02/18/18 1808 0 %   02/18/18 1312 0 %     Pertinent Medications: [x]Reviewed: solumedrol  Pertinent Labs: [x]Reviewed:   Food Allergies: [x]NKFA  []Other   Last BM: 2/19  Edema:        []RUE   []LUE   []RLE   []LLE      Pressure Injury:      [] Stage I   [] Stage II   [] Stage III   [] Stage IV      Wt Readings from Last 30 Encounters:   02/21/18 44.4 kg (97 lb 12.8 oz)   08/02/17 47.6 kg (105 lb)   05/10/17 47.4 kg (104 lb 8 oz)   09/15/15 47.7 kg (105 lb 2.6 oz)   07/31/15 47.6 kg (105 lb)   12/15/14 54.7 kg (120 lb 11.2 oz)   06/17/14 51.7 kg (114 lb)   06/06/14 50.8 kg (112 lb)   02/04/14 51.7 kg (114 lb)   01/06/14 52.3 kg (115 lb 6.4 oz)   06/12/13 53.4 kg (117 lb 11.2 oz)   05/18/13 54.9 kg (121 lb)   05/14/13 54.1 kg (119 lb 4.8 oz)   03/01/13 57.6 kg (126 lb 14.4 oz)   02/19/13 58.6 kg (129 lb 3 oz)   09/28/12 56.4 kg (124 lb 6 oz)   08/20/12 54.7 kg (120 lb 8 oz)   03/26/12 52.8 kg (116 lb 4.8 oz)   10/30/11 53 kg (116 lb 13.5 oz)   10/10/11 52 kg (114 lb 10.2 oz)   08/22/11 52.8 kg (116 lb 6.5 oz)   07/04/11 55.5 kg (122 lb 5.7 oz)   07/02/11 96.6 kg (213 lb)   06/16/11 49 kg (108 lb)   06/11/11 46.3 kg (102 lb)   06/07/11 59 kg (130 lb 1.1 oz)   05/07/11 49.4 kg (109 lb)       Anthropometrics:   Height: 5' 2\" (157.5 cm) Weight: 44.4 kg (97 lb 12.8 oz)   IBW (%IBW):   ( ) UBW (%UBW):   (  %)   Last Weight Metrics:  Weight Loss Metrics 2/21/2018 8/2/2017 5/10/2017 9/15/2015 7/31/2015 12/15/2014 6/17/2014   Today's Wt 97 lb 12.8 oz 105 lb 104 lb 8 oz 105 lb 2.6 oz 105 lb 120 lb 11.2 oz 114 lb   BMI 17.89 kg/m2 20.51 kg/m2 20.41 kg/m2 19.23 kg/m2 19.2 kg/m2 22.07 kg/m2 20.85 kg/m2       BMI: Body mass index is 17.89 kg/(m^2). This BMI is indicative of:   [x]Underweight    []Normal    []Overweight    [] Obesity   [] Extreme Obesity (BMI>40)     Estimated Nutrition Needs (Based on):   1325 Kcals/day (BMR: 900 x 1.2 + 250) , 60 g (1.3 g/kg) Protein  Carbohydrate: At Least 130 g/day  Fluids: 1325 mL/day (1ml/kcal) or per primary team    NUTRITION DIAGNOSES:   Problem:  Underweight      Etiology: related to unclear etiology but likely unable to care for self     Signs/Symptoms: as evidenced by BMI of 17.9      NUTRITION INTERVENTIONS:  Meals/Snacks: General/healthful diet   Supplements: Commercial supplement              GOAL:   consume >25% of meals and ONS in 2-4 days    LEARNING NEEDS (Diet, Food/Nutrient-Drug Interaction):    [x] None Identified   [] Identified and Education Provided/Documented   [] Identified and Pt declined/was not appropriate     Cultureal, Congregation, OR Ethnic Dietary Needs:    [x] None Identified   [] Identified and Addressed     [x] Interdisciplinary Care Plan Reviewed/Documented    [x] Discharge Planning:   General healthy diet     MONITORING /EVALUATION:      Food/Nutrient Intake Outcomes:  Total energy intake  Physical Signs/Symptoms Outcomes: Weight/weight change, Electrolyte and renal profile, GI    NUTRITION RISK:    [x] High              [] Moderate           []  Low  []  Minimal/Uncompromised    PT SEEN FOR:    []  MD Consult: []Calorie Count      []Diabetic Diet Education        []Diet Education     []Electrolyte Management     []General Nutrition Management and Supplements     []Management of Tube Feeding     []TPN Recommendations    []  RN Referral:  []MST score >=2     []Enteral/Parenteral Nutrition PTA     []Pregnant: Gestational DM or Multigestation     []Pressure Ulcer/Wound Care needs        [x]  Low BMI  []  LOS Referral       Carlos Carver RDN  Pager [phone]  Weekend Pager [phone]

## 2018-02-21 NOTE — PROGRESS NOTES
Problem: Falls - Risk of  Goal: *Absence of Falls  Document Sreedhar Fall Risk and appropriate interventions in the flowsheet.    Outcome: Progressing Towards Goal  Fall Risk Interventions:  Mobility Interventions: Bed/chair exit alarm, Communicate number of staff needed for ambulation/transfer, OT consult for ADLs, Patient to call before getting OOB, PT Consult for mobility concerns, PT Consult for assist device competence, Strengthening exercises (ROM-active/passive), Utilize walker, cane, or other assitive device, Utilize gait belt for transfers/ambulation    Mentation Interventions: Adequate sleep, hydration, pain control, Bed/chair exit alarm, Door open when patient unattended, Evaluate medications/consider consulting pharmacy, Family/sitter at bedside, Increase mobility, More frequent rounding, Reorient patient, Room close to nurse's station, Self-releasing belt, Toileting rounds    Medication Interventions: Bed/chair exit alarm, Evaluate medications/consider consulting pharmacy, Patient to call before getting OOB, Teach patient to arise slowly, Utilize gait belt for transfers/ambulation    Elimination Interventions: Bed/chair exit alarm, Call light in reach, Patient to call for help with toileting needs, Toileting schedule/hourly rounds    History of Falls Interventions: Bed/chair exit alarm, Consult care management for discharge planning, Door open when patient unattended, Evaluate medications/consider consulting pharmacy, Investigate reason for fall, Room close to nurse's station, Utilize gait belt for transfer/ambulation

## 2018-02-21 NOTE — PROGRESS NOTES
2/21/2018 11:09 AM    Admit Date: 2/15/2018    Admit Diagnosis: Acute exacerbation of chronic obstructive pulmonary disease*    Subjective:     No further chest pain reported.      Visit Vitals    /58 (BP 1 Location: Left arm, BP Patient Position: At rest)    Pulse 84    Temp 98.9 °F (37.2 °C)    Resp 18    Ht 5' 2\" (1.575 m)    Wt 97 lb 12.8 oz (44.4 kg)    SpO2 96%    BMI 17.89 kg/m2     Current Facility-Administered Medications   Medication Dose Route Frequency    sodium chloride (NS) flush        regadenoson (LEXISCAN) 0.4 mg/5 mL injection        nitroglycerin (NITROSTAT) tablet 0.4 mg  0.4 mg SubLINGual PRN    aluminum-magnesium hydroxide (MAALOX) oral suspension 15 mL  15 mL Oral DAILY PRN    ipratropium (ATROVENT) 0.02 % nebulizer solution 0.5 mg  0.5 mg Nebulization Q6H RT    metoprolol succinate (TOPROL-XL) XL tablet 50 mg  50 mg Oral DAILY    methylPREDNISolone (PF) (SOLU-MEDROL) injection 40 mg  40 mg IntraVENous Q8H    haloperidol lactate (HALDOL) injection 2 mg  2 mg IntraVENous Q6H PRN    cefepime (MAXIPIME) 2 g in 0.9 %  mL IVPB  2 g IntraVENous Q12H    nicotine (NICODERM CQ) 14 mg/24 hr patch 1 Patch  1 Patch TransDERmal DAILY    levalbuterol (XOPENEX) nebulizer soln 1.25 mg/3 mL  1.25 mg Nebulization Q6H RT    aspirin chewable tablet 81 mg  81 mg Oral DAILY    HYDROcodone-acetaminophen (NORCO) 5-325 mg per tablet 1 Tab  1 Tab Oral Q4H PRN    pravastatin (PRAVACHOL) tablet 80 mg  80 mg Oral DAILY    sodium chloride (NS) flush 5-10 mL  5-10 mL IntraVENous Q8H    sodium chloride (NS) flush 5-10 mL  5-10 mL IntraVENous PRN    acetaminophen (TYLENOL) tablet 650 mg  650 mg Oral Q6H PRN    ondansetron (ZOFRAN) injection 4 mg  4 mg IntraVENous Q6H PRN    docusate sodium (COLACE) capsule 100 mg  100 mg Oral DAILY PRN    heparin (porcine) injection 5,000 Units  5,000 Units SubCUTAneous Q8H    guaiFENesin ER (MUCINEX) tablet 600 mg  600 mg Oral Q12H    guaiFENesin (ROBITUSSIN) 100 mg/5 mL oral liquid 100 mg  100 mg Oral TID PRN         Objective:      Visit Vitals    /58 (BP 1 Location: Left arm, BP Patient Position: At rest)    Pulse 84    Temp 98.9 °F (37.2 °C)    Resp 18    Ht 5' 2\" (1.575 m)    Wt 97 lb 12.8 oz (44.4 kg)    SpO2 96%    BMI 17.89 kg/m2       Physical Exam:  Abdomen: soft, non-tender.  Bowel sounds normal.  Extremities: no cyanosis or edema  Heart: regular rate and rhythm, S1, S2 normal, no murmur, click, rub or gallop  Lungs: clear to auscultation bilaterally    Data Review:   Labs:    Recent Results (from the past 24 hour(s))   EKG, 12 LEAD, INITIAL    Collection Time: 02/20/18 12:32 PM   Result Value Ref Range    Ventricular Rate 100 BPM    Atrial Rate 100 BPM    P-R Interval 88 ms    QRS Duration 68 ms    Q-T Interval 360 ms    QTC Calculation (Bezet) 464 ms    Calculated P Axis 52 degrees    Calculated R Axis -56 degrees    Calculated T Axis 77 degrees    Diagnosis       Sinus rhythm with short UT  Left axis deviation  Low voltage QRS  Inferior infarct (cited on or before 15-FEB-2018)  Confirmed by Dorlene Scheuermann ([id number]) on 2/20/2018 7:45:53 PM     TROPONIN I    Collection Time: 02/20/18 12:38 PM   Result Value Ref Range    Troponin-I, Qt. <0.04 <0.05 ng/mL   CK W/ CKMB & INDEX    Collection Time: 02/20/18 12:38 PM   Result Value Ref Range    CK 63 26 - 192 U/L    CK - MB 2.9 <3.6 NG/ML    CK-MB Index 4.6 (H) 0 - 2.5     TROPONIN I    Collection Time: 02/20/18  6:38 PM   Result Value Ref Range    Troponin-I, Qt. <0.04 <0.05 ng/mL   CK W/ CKMB & INDEX    Collection Time: 02/20/18  6:38 PM   Result Value Ref Range    CK 51 26 - 192 U/L    CK - MB 2.3 <3.6 NG/ML    CK-MB Index 4.5 (H) 0 - 2.5     CBC WITH AUTOMATED DIFF    Collection Time: 02/21/18 12:37 AM   Result Value Ref Range    WBC 33.2 (H) 3.6 - 11.0 K/uL    RBC 4.45 3.80 - 5.20 M/uL    HGB 10.2 (L) 11.5 - 16.0 g/dL    HCT 33.6 (L) 35.0 - 47.0 %    MCV 75.5 (L) 80.0 - 99.0 FL    MCH 22.9 (L) 26.0 - 34.0 PG    MCHC 30.4 30.0 - 36.5 g/dL    RDW 20.1 (H) 11.5 - 14.5 %    PLATELET 423 (H) 128 - 400 K/uL    MPV 10.3 8.9 - 12.9 FL    NRBC 0.1 (H) 0  WBC    ABSOLUTE NRBC 0.02 (H) 0.00 - 0.01 K/uL    NEUTROPHILS 94 (H) 32 - 75 %    LYMPHOCYTES 2 (L) 12 - 49 %    MONOCYTES 4 (L) 5 - 13 %    EOSINOPHILS 0 0 - 7 %    BASOPHILS 0 0 - 1 %    IMMATURE GRANULOCYTES 0 0.0 - 0.5 %    ABS. NEUTROPHILS 31.2 (H) 1.8 - 8.0 K/UL    ABS. LYMPHOCYTES 0.7 (L) 0.8 - 3.5 K/UL    ABS. MONOCYTES 1.3 (H) 0.0 - 1.0 K/UL    ABS. EOSINOPHILS 0.0 0.0 - 0.4 K/UL    ABS. BASOPHILS 0.0 0.0 - 0.1 K/UL    ABS. IMM. GRANS. 0.0 0.00 - 0.04 K/UL    RBC COMMENTS MICROCYTOSIS  1+        RBC COMMENTS HYPOCHROMIA  1+        RBC COMMENTS SPHEROCYTES  PRESENT        RBC COMMENTS ANISOCYTOSIS  1+        WBC COMMENTS HYPERSEGMENTED NEUT      DF MANUAL     METABOLIC PANEL, COMPREHENSIVE    Collection Time: 02/21/18 12:37 AM   Result Value Ref Range    Sodium 137 136 - 145 mmol/L    Potassium 3.8 3.5 - 5.1 mmol/L    Chloride 105 97 - 108 mmol/L    CO2 24 21 - 32 mmol/L    Anion gap 8 5 - 15 mmol/L    Glucose 124 (H) 65 - 100 mg/dL    BUN 25 (H) 6 - 20 MG/DL    Creatinine 0.85 0.55 - 1.02 MG/DL    BUN/Creatinine ratio 29 (H) 12 - 20      GFR est AA >60 >60 ml/min/1.73m2    GFR est non-AA >60 >60 ml/min/1.73m2    Calcium 9.0 8.5 - 10.1 MG/DL    Bilirubin, total 0.5 0.2 - 1.0 MG/DL    ALT (SGPT) 31 12 - 78 U/L    AST (SGOT) 21 15 - 37 U/L    Alk.  phosphatase 151 (H) 45 - 117 U/L    Protein, total 7.5 6.4 - 8.2 g/dL    Albumin 2.5 (L) 3.5 - 5.0 g/dL    Globulin 5.0 (H) 2.0 - 4.0 g/dL    A-G Ratio 0.5 (L) 1.1 - 2.2     MAGNESIUM    Collection Time: 02/21/18 12:37 AM   Result Value Ref Range    Magnesium 2.1 1.6 - 2.4 mg/dL   VITAMIN B12    Collection Time: 02/21/18 12:37 AM   Result Value Ref Range    Vitamin B12 743 211 - 911 pg/mL   FOLATE    Collection Time: 02/21/18 12:37 AM   Result Value Ref Range    Folate 10.0 5.0 - 21.0 ng/mL   AMMONIA    Collection Time: 02/21/18 12:37 AM   Result Value Ref Range    Ammonia <10 <32 UMOL/L   TSH 3RD GENERATION    Collection Time: 02/21/18 12:37 AM   Result Value Ref Range    TSH 0.74 0.36 - 3.74 uIU/mL   PATHOLOGIST REVIEW    Collection Time: 02/21/18 12:37 AM   Result Value Ref Range    Pathologist review (NOTE)        Telemetry: normal sinus rhythm      Assessment:     Active Problems:    Acute exacerbation of chronic obstructive pulmonary disease (COPD) ([facility] [address] (2/15/2018)      Benzodiazepine abuse (2/17/2018)      Acute MI ()        Plan:     1. Transient a. Fib: most likely due to underlying COPD and hypoxia. Normal LVEF. No further episodes. Monitor.    2. H/o CAD: no further chest pain. -ve cardiac enzymes. Stress test today. Last stent in 2009. Continue BB, statin. Aspirin.

## 2018-02-21 NOTE — PROGRESS NOTES
Pt's caregiver Nikki Jimenez can be reached at [phone]. She states that her voicemail is full, she can receive texts and she will call back phone numbers if she misses the call.

## 2018-02-22 NOTE — PROGRESS NOTES
Attempted to see patient for PT session, however patient receiving bedside EEG at this time. Will hold at this time and follow up time permitting and patient availability. Thanks.    Denzil Bernheim, PT, DPT

## 2018-02-22 NOTE — PROGRESS NOTES
Hospitalist Progress Note    NAME: Collin Mcadams   :  1945   MRN:  [id number]       Interim Hospital Summary: 74 y.o. female whom presented on 2/15/2018 with      Assessment / Plan:  Acute COPD exacerbation due to upper respiratory tract infection  Sepsis (POA)  Leukocytosis  Aspiration PNA  Hx of Tobacco use  - CXR: New to bilateral airspace disease likely represents pneumonia. Will treat like aspiration PNA. Continue with Cefepime and Flagyl  - O2 Sat RA @ 95%   - IV steroids changed to PO prednisone  - continue with xopenex (was changed due to tachycardia)  - continue with mucinex (needs refills for inhalers and needs new PCP on discharge)  - continue with nicotine patch      Chest Pain (resolved)  Elevated troponin upon admission  Paroxysmal atrial fibrillation  - cardiology following; no further chest pain. Last stent placement in . Stress test result pending  - continue with ASA and metoprolol  - remain in junctional rhythm with rate of 80 (MS interval 0.1)  - normal Echo. Stress test revealed no evidence of MI      Acute encephalopathy   HX Clonazepam abuse  - mental status improved this morning. Very pleasant. Wearing hospital gown (usually refuse to wear hospital gown). [place] to self and place.  - Neurology following; EEG to be done. - Pt doesn't have any close family members who are involved in her care or in her life. Dedra Vasquez has been provided some medical information. As of now Dedra Pedro is the most cloest person to patient. Dedra Pedro is pt's landlord for the past year who helps pt with cooking and providing basic needs. Prior to the admission, pt was able to cook and take care of herself. Dedra Vasquez did share that pt has a history of taking clonazepam,  Nyquil, and sleeping pill frequently. Pt told Dedra Vasquez that those medication makes her feel better.  Dedra Vasquez would not want pt to know that she provided us with pt's medical information.  - Head CT:  No acute intracranial findings and no significant change since previous study. Chronic lacunar infarcts and white matter hypodensity consistent with chronic  small vessel ischemic change.  - haldol prn agitation  - will consider pscy consult if need  - continue w/ aspirin, echo unremarkable      Diarrhea resolved    CAD, HTN, HLD  -continue asprin, BB, statin      Code Status: dnr  Surrogate Decision Maker: Janeth Andersons (landlord for the past one year)     DVT Prophylaxis: heparin          Recommended Disposition: TBD         Subjective:     Chief Complaint / Reason for Physician Visit  \"I am tired, but feeling ok\". Discussed with RN events overnight. Review of Systems:  Symptom Y/N Comments  Symptom Y/N Comments   Fever/Chills n   Chest Pain n    Poor Appetite    Edema     Cough n   Abdominal Pain     Sputum    Joint Pain     SOB/HANDLEY n   Pruritis/Rash     Nausea/vomit n   Tolerating PT/OT     Diarrhea    Tolerating Diet     Constipation    Other       Could NOT obtain due to:      Objective:     VITALS:   Last 24hrs VS reviewed since prior progress note. Most recent are:  Patient Vitals for the past 24 hrs:   Temp Pulse Resp BP SpO2   02/22/18 0725 - - - - 96 %   02/22/18 0718 98 °F (36.7 °C) 94 18 125/70 98 %   02/22/18 0312 98.2 °F (36.8 °C) 92 18 134/72 99 %   02/22/18 0205 - - - - 95 %   02/21/18 2304 98.1 °F (36.7 °C) (!) 102 18 135/64 94 %   02/21/18 2006 - - - - 100 %   02/21/18 1954 97.6 °F (36.4 °C) - 18 180/78 96 %   02/21/18 1529 97.8 °F (36.6 °C) 96 18 139/67 95 %       Intake/Output Summary (Last 24 hours) at 02/22/18 1012  Last data filed at 02/22/18 0330   Gross per 24 hour   Intake              480 ml   Output                0 ml   Net              480 ml        PHYSICAL EXAM:  General: Ill appearing, underweight. Alert, cooperative, no acute distress    EENT:  EOMI. Anicteric sclerae. MMM  Resp:  Clear in apex with decreased breath sounds at bases. no wheezing or rales.   No accessory muscle use  CV:  Regular  rhythm,  No edema  GI:  Soft, Non distended, Non tender.  +Bowel sounds  Neurologic:  Alert and oriented X self and place. normal speech,   Psych:   Poor insight. Not anxious nor agitated this morning  Skin:  No rashes. No jaundice    Reviewed most current lab test results and cultures  YES  Reviewed most current radiology test results   YES  Review and summation of old records today    NO  Reviewed patient's current orders and MAR    YES  PMH/SH reviewed - no change compared to H&P  ________________________________________________________________________  Care Plan discussed with:    Comments   Patient y    Family      RN y    Care Manager y    Consultant                       y Multidiciplinary team rounds were held today with , nursing, pharmacist and clinical coordinator. Patient's plan of care was discussed; medications were reviewed and discharge planning was addressed. ________________________________________________________________________  Total NON critical care TIME:  30  Minutes    Total CRITICAL CARE TIME Spent:   Minutes non procedure based      Comments   >50% of visit spent in counseling and coordination of care     ________________________________________________________________________  Rice Fanta, NP     Procedures: see electronic medical records for all procedures/Xrays and details which were not copied into this note but were reviewed prior to creation of Plan. LABS:  I reviewed today's most current labs and imaging studies.   Pertinent labs include:  Recent Labs      02/22/18 0153 02/21/18 0037   WBC  36.9*  33.2*   HGB  10.0*  10.2*   HCT  32.1*  33.6*   PLT  425*  527*     Recent Labs      02/22/18   0153  02/21/18   0037   NA  136  137   K  3.5  3.8   CL  103  105   CO2  23  24   GLU  135*  124*   BUN  24*  25*   CREA  0.89  0.85   CA  8.2*  9.0   MG  1.9  2.1   ALB  2.1*  2.5*   TBILI  0.3  0.5   SGOT  20  21   ALT  26  31       Signed: )Juliann Chand, NP

## 2018-02-22 NOTE — PROCEDURES
[address]  MR#: [id number]  : 1945  ACCOUNT #: [de-identified]   DATE OF SERVICE: 2018    EXAM NUMBER:  [id number]. DESCRIPTION OF PROCEDURE:  The electrodes were applied in accordance with International 10-20 system of electrode placement. EEG was reviewed in both bipolar and referential montages. This is an awake EEG. The background is a symmetric 9 Hz alpha rhythm. It is intermixed with a 6-7 Hz theta rhythm which is symmetric as well. Hyperventilation and photic stimulation were not performed. IMPRESSION:  This is an abnormal electroencephalogram due to the presence of excessive slowing which is symmetric. This can be seen with a variety of toxic, metabolic and structural encephalopathies. Clinical correlation is advised. The absence of seizures on an electroencephalogram does not eliminate diagnosis of epilepsy.       MD PARISH Gonzalez /   D: 2018 12:39     T: 2018 12:57  JOB #: [id number]

## 2018-02-22 NOTE — PROGRESS NOTES
Problem: Falls - Risk of  Goal: *Absence of Falls  Document Sreedhar Fall Risk and appropriate interventions in the flowsheet.    Outcome: Progressing Towards Goal  Fall Risk Interventions:  Mobility Interventions: Bed/chair exit alarm, Communicate number of staff needed for ambulation/transfer, Mechanical lift, OT consult for ADLs, Patient to call before getting OOB    Mentation Interventions: Adequate sleep, hydration, pain control, Bed/chair exit alarm, Door open when patient unattended, Evaluate medications/consider consulting pharmacy, Increase mobility, More frequent rounding, Reorient patient, Room close to nurse's station, Self-releasing belt, Toileting rounds, Update white board    Medication Interventions: Bed/chair exit alarm, Evaluate medications/consider consulting pharmacy, Patient to call before getting OOB, Teach patient to arise slowly    Elimination Interventions: Bed/chair exit alarm, Call light in reach, Elevated toilet seat, Patient to call for help with toileting needs, Toilet paper/wipes in reach, Toileting schedule/hourly rounds    History of Falls Interventions: Bed/chair exit alarm, Consult care management for discharge planning, Door open when patient unattended, Evaluate medications/consider consulting pharmacy, Investigate reason for fall

## 2018-02-22 NOTE — PROGRESS NOTES
Problem: Mobility Impaired (Adult and Pediatric)  Goal: *Acute Goals and Plan of Care (Insert Text)  Physical Therapy Goals    Revised 2/22/2018  1. Patient will move from supine to sit and sit to supine  in bed with independence within 7 day(s). 2.  Patient will transfer from bed to chair and chair to bed with modified independence using the least restrictive device within 7 day(s). 3.  Patient will perform sit to stand with modified independence within 7 day(s). 4.  Patient will ambulate with modified independence for 100 feet with the least restrictive device within 7 day(s). Initiated 2/16/2018  1. Patient will move from supine to sit and sit to supine , scoot up and down and roll side to side in bed with independence within 7 day(s). Remainder of goals to be set upon further assessment       physical Therapy TREATMENT: WEEKLY REASSESSMENT  Patient: Wanda Knott (58 y.o. female)  Date: 2/22/2018  Diagnosis: Acute exacerbation of chronic obstructive pulmonary disease (COPD) ([facility] [address] <principal problem not specified>       Precautions: Contact, Fall, Bed Alarm  Chart, physical therapy assessment, plan of care and goals were reviewed. ASSESSMENT:  Patient received reclined in recliner and agreeable to therapy. Patient tolerated session fairly well. Upon arrival to room pt did not have nasal cannula in place. SpO2: 98%; reapplied 3L of oxygen during activity and remained 97%. Patient completed sit<>stand without AD with min assist and ambulated a short distance within the room with min assist. Patient demonstrated narrow GILMA, path deviations, generalized instability and reaching out for wall for support. Patient returned to seated in chair with all needs met. Patient will continue to benefit from PT to progress mobility, improve tolerance to activity and reach highest level of independence. Patient may benefit from use of RW next session to provide bilateral UE support.  Patient will benefit from SNF upon discharge given she is functioning well below baseline status and has limited support at home. Patient's progression toward goals since last assessment: new goals set from evaluation     PLAN:  Goals have been updated based on progression since last assessment. Patient continues to benefit from skilled intervention to address the above impairments. Continue to follow the patient 4 times a week to address goals. Planned Interventions:  [x]              Bed Mobility Training             [x]       Neuromuscular Re-Education  [x]              Transfer Training                   []       Orthotic/Prosthetic Training  [x]              Gait Training                         []       Modalities  [x]              Therapeutic Exercises           []       Edema Management/Control  [x]              Therapeutic Activities            [x]       Patient and Family Training/Education  []              Other (comment):  Discharge Recommendations: Skilled Nursing Facility  Further Equipment Recommendations for Discharge: TBD at rehab     SUBJECTIVE:   Patient stated I am feeling better! Nic Juan Carlos    OBJECTIVE DATA SUMMARY:   Critical Behavior:  Neurologic State: Alert, Confused  Orientation Level: Oriented to person  Cognition: Decreased command following, Decreased attention/concentration  Safety/Judgement: Awareness of environment    Functional Mobility Training:     Transfers:  Sit to Stand: Minimum assistance  Stand to Sit: Minimum assistance                 Balance:  Sitting: Intact  Sitting - Static: Good (unsupported)  Sitting - Dynamic: Fair (occasional)  Standing: Impaired  Standing - Static: Fair  Standing - Dynamic : Fair  Ambulation/Gait Training:  Distance (ft): 20 Feet (ft)  Assistive Device: Gait belt (HHA )  Ambulation - Level of Assistance: Minimal assistance        Gait Abnormalities: Decreased step clearance; Path deviations;Trunk sway increased        Base of Support: Narrowed; Center of gravity altered Speed/Leela: Pace decreased (<100 feet/min); Shuffled  Step Length: Right shortened;Left shortened       Therapeutic Exercises: Ankle pumps, LAQ  Pain:  Pain Scale 1: Numeric (0 - 10)  Pain Intensity 1: 0              Activity Tolerance:   Good. VSS on 3L  Please refer to the flowsheet for vital signs taken during this treatment.   After treatment:   [x]  Patient left in no apparent distress sitting up in chair  []  Patient left in no apparent distress in bed  [x]  Call bell left within reach  [x]  Nursing notified  []  Caregiver present  [x]  Bed alarm activated    COMMUNICATION/COLLABORATION:   The patients plan of care was discussed with: Registered Nurse    Mac Alexander PT,D PT   Time Calculation: 14 mins

## 2018-02-22 NOTE — PROGRESS NOTES
Nutrition Assessment:    INTERVENTIONS/RECOMMENDATIONS:   Diet progression per primary team  Continue glucerna shakes   Trial of magic cup    ASSESSMENT:   Chart reviewed. Pt reports of a fair appetite but does like glucerna shakes. She also agreed to try magic cup. Will monitor PO intake. Diet Order: Full liquids  % Eaten:  No data found. Pertinent Medications: [x]Reviewed: prednisone,   Pertinent Labs: [x]Reviewed:   Food Allergies: [x]NKFA  []Other   Last BM:  2/21  Edema:      []RUE   []LUE   []RLE   []LLE      Pressure Ulcer:      [] Stage I   [] Stage II   [] Stage III   [] Stage IV      Anthropometrics: Height: 5' 2\" (157.5 cm) Weight: 44.4 kg (97 lb 12.8 oz)    IBW (%IBW):   ( ) UBW (%UBW):   (  %)    BMI: Body mass index is 17.89 kg/(m^2). This BMI is indicative of:  [x]Underweight   []Normal   []Overweight   [] Obesity   [] Extreme Obesity (BMI>40)  Last Weight Metrics:  Weight Loss Metrics 2/21/2018 8/2/2017 5/10/2017 9/15/2015 7/31/2015 12/15/2014 6/17/2014   Today's Wt 97 lb 12.8 oz 105 lb 104 lb 8 oz 105 lb 2.6 oz 105 lb 120 lb 11.2 oz 114 lb   BMI 17.89 kg/m2 20.51 kg/m2 20.41 kg/m2 19.23 kg/m2 19.2 kg/m2 22.07 kg/m2 20.85 kg/m2       Estimated Nutrition Needs (Based on): 1325 Kcals/day (BMR: 900 x 1.2 + 250) , 60 g (1.3 g/kg) Protein  Carbohydrate: At Least 130 g/day  Fluids: 1325 mL/day or per primary team    NUTRITION DIAGNOSES:   Problem:  Underweight      Etiology: related to unclear etiology but likely unable to care for self     Signs/Symptoms: as evidenced by BMI of 17.9      NUTRITION INTERVENTIONS:  Meals/Snacks: General/healthful diet   Supplements: Commercial supplement              GOAL:   consume >25% of meals and ONS in 2-4 days    NUTRITION MONITORING AND EVALUATION      Food/Nutrient Intake Outcomes:  Total energy intake  Physical Signs/Symptoms Outcomes: Weight/weight change, Electrolyte and renal profile, GI    Previous Goal Met:   [] Met              [x] Progressing Towards Goal              [] Not Progressing Towards Goal   Previous Recommendations:   [x] Implemented          [] Not Implemented          [] Not Applicable    LEARNING NEEDS (Diet, Food/Nutrient-Drug Interaction):    [x] None Identified   [] Identified and Education Provided/Documented   [] Identified and Pt declined/was not appropriate     Cultural, Yazidi, OR Ethnic Dietary Needs:    [x] None Identified   [] Identified and Addressed     [x] Interdisciplinary Care Plan Reviewed/Documented    [x] Discharge Planning: General healthy diet   [] Participated in Interdisciplinary Rounds    NUTRITION RISK:    [x] High        to      [x] Moderate           []  Low  []  Minimal/Uncompromised      Antonio Pinto RDN  Pager [phone]  Weekend Pager [phone]

## 2018-02-22 NOTE — PROGRESS NOTES
ADULT PROTOCOL: JET AEROSOL  REASSESSMENT    Patient  Shanique Rizo     74 y.o.   female     2/22/2018  1:32 PM    Breath Sounds Pre Procedure: Right Breath Sounds: Clear, Diminished                               Left Breath Sounds: Clear, Diminished    Breath Sounds Post Procedure: Right Breath Sounds: Diminished                                 Left Breath Sounds: Diminished    Breathing pattern: Pre procedure Breathing Pattern: Regular          Post procedure Breathing Pattern: Regular    Heart Rate: Pre procedure Pulse: 97           Post procedure Pulse: 101    Resp Rate: Pre procedure Respirations: 18           Post procedure Respirations: 16      Oxygen: O2 Device: Room air       Changed: No    SpO2: Pre procedure SpO2: 96 %    Room Air               Post procedure SpO2: 99 %  Room Air     Nebulizer Therapy: Current medications Aerosolized Medications: Ipratropium bromide, Xopenex Q4 Resp      Changed: No        Patient takes poor breathing treatment. Patient refuses to keep mask on      Smoking History:    Smoking status: Current Every Day Smoker       Packs/day: 0.50       Years: 0.00       Types: Cigarettes           Problem List:   Patient Active Problem List   Diagnosis Code    Anxiety and depression F41.8    Postsurgical percutaneous transluminal coronary angioplasty status Z98.61    Tobacco use disorder F17.200    Old myocardial infarction I25.2    Essential hypertension, benign I10    Coronary atherosclerosis of native coronary artery I25.10    Mixed hyperlipidemia E78.2    Acute myocardial infarction of other inferior wall, subsequent episode of care I21.19    Multiple bruises T07. 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

## 2018-02-22 NOTE — PROGRESS NOTES
Spiritual Care Assessment/Progress Notes    Shanique Rizo [id number]  [id number]    1945  74 y.o.  female    Patient Telephone Number: [phone] (home)   Temple Affiliation: Deven Enriquez   Language: English   Extended Emergency Contact Information  Primary Emergency Contact: [address] Phone: [phone]  Relation: Child   Patient Active Problem List    Diagnosis Date Noted    Benzodiazepine abuse 02/17/2018    Acute MI     Acute exacerbation of chronic obstructive pulmonary disease (COPD) ([facility] [address] 02/15/2018    S/P angioplasty with stent--ISR RCA PCI 07/31/2015    Iatrogenic hypotension 07/31/2015    COPD with emphysema/chronic bronchitis with intermittent exacerbations 06/06/2014    Chronic ischemic heart disease 01/06/2014    Multiple bruises 05/14/2013    Anxiety and depression 07/25/2012    Postsurgical percutaneous transluminal coronary angioplasty status 07/25/2012    Tobacco use disorder 07/25/2012    Old myocardial infarction 07/25/2012    Essential hypertension, benign 07/25/2012    Coronary atherosclerosis of native coronary artery 07/25/2012    Mixed hyperlipidemia 07/25/2012    Acute myocardial infarction of other inferior wall, subsequent episode of care 07/25/2012        Date: 2/22/2018       Level of Temple/Spiritual Activity:  [x]         Involved in rell tradition/spiritual practice    []         Not involved in rell tradition/spiritual practice  []         Spiritually oriented    []         Claims no spiritual orientation    []         seeking spiritual identity  []         Feels alienated from Jewish practice/tradition  []         Feels angry about Jewish practice/tradition  [x]         Spirituality/Jewish tradition may be a resource for coping at this time.   []         Not able to assess due to medical condition    Services Provided Today:  []         crisis intervention    []         reading Scriptures  [x] spiritual assessment    []         prayer  [x]         empathic listening/emotional support  []         rites and rituals (cite in comments)  [x]         life review     []         Restoration support  []         theological development    []         advocacy  []         ethical dialog     []         blessing  []         bereavement support    []         support to family  []         anticipatory grief support   []         help with AMD  []         spiritual guidance    []         meditation      Spiritual Care Needs  []         Emotional Support  []         Spiritual/Hoahaoism Care  []         Loss/Adjustment  []         Advocacy/Referral                /Ethics  [x]         No needs expressed at               this time  []         Other: (note in               comments)  [address]  []         Follow up visits with               pt/family  []         Provide materials  []         Schedule sacraments  []         Contact Community               Clergy  [x]         Follow up as needed  []         Other: (note in               comments)     Comments: Initial visit with patient in 2212. Introduced self and explained role of chaplains in the hospital. Provided active listening as patient provided extensive life review and shared stories. Patient was especially focused on her desire to see her younger sister whom she is very close with and her sadness over not being able to attend the wedding of a close friend in [place]. Patient has no specific needs at this time but shared that she was very happy to see a  and would be open to continued visits as chaplains are able. Advised patient of  availability and assured of ongoing support as needed and desired.  Manuel Palomino M.Div.    Paging Service [phone] ([phone])

## 2018-02-22 NOTE — PROGRESS NOTES
Problem: Dysphagia (Adult)  Goal: *Acute Goals and Plan of Care (Insert Text)  2/21/2018  Speech path  1. Pt will tolerate full liquids with no overt s/s of aspiration. 2. Pt will tolerate solids with no overt s/s of aspiration. Speech language pathology dysphagia treatment  Patient: Jael Rincon (89 y.o. female)  Date: 2/22/2018  Diagnosis: Acute exacerbation of chronic obstructive pulmonary disease (COPD) ([facility] [address] <principal problem not specified>       Precautions:  Contact, Fall, Bed Alarm    ASSESSMENT:  Patient initially appropriate and agreeable to PO intake. Patient tolerated purees and thin liquids well with no overt s/s aspiration. Patient then adamantly refused solid trials despite mod encouragement, therefore unable to determine tolerance. Progression toward goals:  []         Improving appropriately and progressing toward goals  [x]         Improving slowly and progressing toward goals  []         Not making progress toward goals and plan of care will be adjusted     PLAN:  Recommendations and Planned Interventions:  --Patient is safe for full liquid diet. Refused solids  --SLP to follow for diet advancement as patient agreeable  Patient continues to benefit from skilled intervention to address the above impairments. Continue treatment per established plan of care. Discharge Recommendations: To Be Determined     SUBJECTIVE:   Patient stated I have a cat, and I have a fish.  without context. OBJECTIVE:   Cognitive and Communication Status:  Neurologic State: Alert, Confused  Orientation Level: Oriented to person  Cognition: Decreased command following, Decreased attention/concentration  Perception: Appears intact  Perseveration: No perseveration noted  Safety/Judgement: Awareness of environment  Dysphagia Treatment:     P.O. Trials:  Patient Position: upright in bed  Vocal quality prior to P.O.: No impairment  Consistency Presented:  Thin liquid;Puree (refused solid)  How Presented: Self-fed/presented;Spoon;Cup/sip;Cup/gulp;Straw;Successive swallows     Bolus Acceptance: No impairment  Bolus Formation/Control: No impairment     Propulsion: Delayed (# of seconds)  Oral Residue: None  Initiation of Swallow: No impairment  Laryngeal Elevation: Functional  Aspiration Signs/Symptoms: None  Pharyngeal Phase Characteristics: No impairment, issues, or problems                Pain:  Pain Scale 1: Numeric (0 - 10)  Pain Intensity 1: 0     After treatment:   []              Patient left in no apparent distress sitting up in chair  [x]              Patient left in no apparent distress in bed  [x]              Call bell left within reach  [x]              Nursing notified  []              Caregiver present  []              Bed alarm activated    COMMUNICATION/EDUCATION:   The patients plan of care including recommendations, planned interventions, and recommended diet changes were discussed with: Registered Nurse.     Jose Pimentel SLP  Time Calculation: 15 mins

## 2018-02-22 NOTE — PROGRESS NOTES
Bedside shift change report given to Priti Dubois (oncoming nurse) by Feliciano Libman (offgoing nurse). Report included the following information SBAR, Kardex, Intake/Output, MAR and Recent Results. SHIFT SUMMARY:            [address] NURSING NOTE   Admission Date 2/15/2018   Admission Diagnosis Acute exacerbation of chronic obstructive pulmonary disease (COPD) ([facility] [address]   Consults IP CONSULT TO HOSPITALIST  IP CONSULT TO CARDIOLOGY  IP CONSULT TO NEUROLOGY      Cardiac Monitoring [x] Yes [] No      Purposeful Hourly Rounding [x] Yes    Sreedhar Score Total Score: 5   Sreedhar score 3 or > [x] Bed Alarm [x] [employer] [] 1:1 sitter [] Patient refused (Place signed refusal form in chart)   Sal Score Sal Score: 16   Sal score 14 or < [] PMT consult [] Wound Care consult    []  Specialty bed  [] Nutrition consult      Influenza Vaccine Received Flu Vaccine for Current Season (usually Sept-March): Yes           Oxygen needs? [x] Room air Oxygen @  []1L    []2L    []3L   []4L    []5L   []6L     Use home O2? [] Yes [x] No  Perform O2 challenge test using  smartphrase (.Homeoxygen)      Last bowel movement Last Bowel Movement Date: 02/19/18      Urinary Catheter             LDAs               Peripheral IV 08/02/17 Right Arm (Active)       Peripheral IV 02/21/18 Left Forearm (Active)   Site Assessment Clean, dry, & intact 2/21/2018  8:05 PM   Phlebitis Assessment 0 2/21/2018  8:05 PM   Infiltration Assessment 0 2/21/2018  8:05 PM   Dressing Status Clean, dry, & intact 2/21/2018  8:05 PM   Dressing Type Transparent 2/21/2018  8:05 PM   Hub Color/Line Status Blue; Infusing 2/21/2018  8:05 PM   Action Taken Blood drawn 2/21/2018 12:44 AM                         Readmission Risk Assessment Tool Score High Risk            23       Total Score        3 Has Seen PCP in Last 6 Months (Yes=3, No=0)    3 Patient Length of Stay (>5 days = 3)    9 Pt.  Coverage (Medicare=5 , Medicaid, or Self-Pay=4)    8 Charlson Comorbidity Score (Age + Comorbid Conditions)        Criteria that do not apply:    . Living with Significant Other. Assisted Living. LTAC. SNF.  or   Rehab    IP Visits Last 12 Months (1-3=4, 4=9, >4=11)       Expected Length of Stay 3d 19h   Actual Length of Stay 6

## 2018-02-22 NOTE — PROGRESS NOTES
CM consult acknowledged. Pt has Medicare and Medicaid insurance per chart. NP and CM contacted pt's emergency contact (son, Jeff Chavira) on Tuesday and Wednesday, son does not want to be involved. Pt's domolorrobbin Pierre Babinski) who has been her support system is willing, but will need to rule out other NOK as mPOA first. CM contacted  to assist with reaching out to pt's other children to rule out NOK. Also look to having pt assessed for decision-making capacity when mental status improves, as this is not pt's baseline per Jaden Bustos. Care Management Interventions  PCP Verified by CM: Yes  Palliative Care Criteria Met (RRAT>21 & CHF Dx)?: No  Mode of Transport at Discharge: Other (see comment) (TBD by disposition)  Transition of Care Consult (CM Consult): Discharge Planning  Discharge Durable Medical Equipment: No  Health Maintenance Reviewed: Yes  Physical Therapy Consult: Yes  Occupational Therapy Consult: Yes  Speech Therapy Consult: Yes  Current Support Network:  Other (Rents an apartment from [place]; estranged from children)  Confirm Follow Up Transport: Friends (Jaden shin)  Plan discussed with Pt/Family/Caregiver: Yes  Discharge Location  Discharge Placement:  (TBD)    KAMALJIT Mccormick Supervisee in Social Work, [employer]  [phone]

## 2018-02-22 NOTE — PROGRESS NOTES
2/22/2018 10:59 AM    Admit Date: 2/15/2018    Admit Diagnosis: Acute exacerbation of chronic obstructive pulmonary disease*    Subjective:     Brady Lawson denies chest pain, chest pressure/discomfort, dyspnea, palpitations.     Visit Vitals    /85 (BP 1 Location: Left arm, BP Patient Position: At rest)    Pulse 92    Temp 98.4 °F (36.9 °C)    Resp 18    Ht 5' 2\" (1.575 m)    Wt 97 lb 12.8 oz (44.4 kg)    SpO2 93%    BMI 17.89 kg/m2     Current Facility-Administered Medications   Medication Dose Route Frequency    predniSONE (DELTASONE) tablet 20 mg  20 mg Oral DAILY WITH BREAKFAST    [START ON 2/23/2018] metoprolol succinate (TOPROL-XL) XL tablet 100 mg  100 mg Oral DAILY    ipratropium (ATROVENT) 0.02 % nebulizer solution 0.5 mg  0.5 mg Nebulization Q4H RT    levalbuterol (XOPENEX) nebulizer soln 1.25 mg/3 mL  1.25 mg Nebulization Q4H RT    metroNIDAZOLE (FLAGYL) IVPB premix 500 mg  500 mg IntraVENous Q8H    nitroglycerin (NITROSTAT) tablet 0.4 mg  0.4 mg SubLINGual PRN    aluminum-magnesium hydroxide (MAALOX) oral suspension 15 mL  15 mL Oral DAILY PRN    haloperidol lactate (HALDOL) injection 2 mg  2 mg IntraVENous Q6H PRN    cefepime (MAXIPIME) 2 g in 0.9 %  mL IVPB  2 g IntraVENous Q12H    nicotine (NICODERM CQ) 14 mg/24 hr patch 1 Patch  1 Patch TransDERmal DAILY    aspirin chewable tablet 81 mg  81 mg Oral DAILY    HYDROcodone-acetaminophen (NORCO) 5-325 mg per tablet 1 Tab  1 Tab Oral Q4H PRN    pravastatin (PRAVACHOL) tablet 80 mg  80 mg Oral DAILY    sodium chloride (NS) flush 5-10 mL  5-10 mL IntraVENous Q8H    sodium chloride (NS) flush 5-10 mL  5-10 mL IntraVENous PRN    acetaminophen (TYLENOL) tablet 650 mg  650 mg Oral Q6H PRN    ondansetron (ZOFRAN) injection 4 mg  4 mg IntraVENous Q6H PRN    docusate sodium (COLACE) capsule 100 mg  100 mg Oral DAILY PRN    heparin (porcine) injection 5,000 Units  5,000 Units SubCUTAneous Q8H    guaiFENesin ER (MUCINEX) tablet 600 mg  600 mg Oral Q12H    guaiFENesin (ROBITUSSIN) 100 mg/5 mL oral liquid 100 mg  100 mg Oral TID PRN         Objective:      Visit Vitals    /85 (BP 1 Location: Left arm, BP Patient Position: At rest)    Pulse 92    Temp 98.4 °F (36.9 °C)    Resp 18    Ht 5' 2\" (1.575 m)    Wt 97 lb 12.8 oz (44.4 kg)    SpO2 93%    BMI 17.89 kg/m2       Physical Exam:  Abdomen: soft, non-tender. Bowel sounds normal.   Extremities: no cyanosis or edema  Heart: regular rate and rhythm, S1, S2 normal, no murmur, click, rub or gallop  Lungs: decreased breath sounds bilaterally  Neurologic: Grossly normal    Data Review:   Labs:    Recent Results (from the past 24 hour(s))   NUCLEAR STRESS TEST    Collection Time: 02/21/18 11:53 AM   Result Value Ref Range    Diagnosis       Nondiagnostic stress test  cardiolite Images to follow    Confirmed by Heather Hardy ([id number]),  Eleno Paul ([id number]) on   2/21/2018 11:51:37 AM      Test indication Chest Pain     Functional capacity      ECG Interp. Before Exercise Normal     ECG Interp. During Exercise none     Overall HR response to exercise      Overall BP response to exercise      Max. Systolic  mmHg    Max. Diastolic BP 75 mmHg    Max.  Heart rate 117 BPM    [facility] treadmill score 5456     Grigsby TM score result      Peak Ex METs 1.0 METS    Protocol name Shweta Jeferson cardiac condition      Attending YVAN Fischer M.D.    CBC WITH AUTOMATED DIFF    Collection Time: 02/22/18  1:53 AM   Result Value Ref Range    WBC 36.9 (H) 3.6 - 11.0 K/uL    RBC 4.25 3.80 - 5.20 M/uL    HGB 10.0 (L) 11.5 - 16.0 g/dL    HCT 32.1 (L) 35.0 - 47.0 %    MCV 75.5 (L) 80.0 - 99.0 FL    MCH 23.5 (L) 26.0 - 34.0 PG    MCHC 31.2 30.0 - 36.5 g/dL    RDW 20.1 (H) 11.5 - 14.5 %    PLATELET 762 (H) 626 - 400 K/uL    MPV 10.0 8.9 - 12.9 FL    NRBC 0.0 0  WBC    ABSOLUTE NRBC 0.00 0.00 - 0.01 K/uL    NEUTROPHILS 97 (H) 32 - 75 % LYMPHOCYTES 2 (L) 12 - 49 %    MONOCYTES 1 (L) 5 - 13 %    EOSINOPHILS 0 0 - 7 %    BASOPHILS 0 0 - 1 %    IMMATURE GRANULOCYTES 0 0.0 - 0.5 %    ABS. NEUTROPHILS 35.8 (H) 1.8 - 8.0 K/UL    ABS. LYMPHOCYTES 0.7 (L) 0.8 - 3.5 K/UL    ABS. MONOCYTES 0.4 0.0 - 1.0 K/UL    ABS. EOSINOPHILS 0.0 0.0 - 0.4 K/UL    ABS. BASOPHILS 0.0 0.0 - 0.1 K/UL    ABS. IMM. GRANS. 0.0 0.00 - 0.04 K/UL    DF AUTOMATED      RBC COMMENTS ANISOCYTOSIS  2+       MAGNESIUM    Collection Time: 02/22/18  1:53 AM   Result Value Ref Range    Magnesium 1.9 1.6 - 2.4 mg/dL   METABOLIC PANEL, COMPREHENSIVE    Collection Time: 02/22/18  1:53 AM   Result Value Ref Range    Sodium 136 136 - 145 mmol/L    Potassium 3.5 3.5 - 5.1 mmol/L    Chloride 103 97 - 108 mmol/L    CO2 23 21 - 32 mmol/L    Anion gap 10 5 - 15 mmol/L    Glucose 135 (H) 65 - 100 mg/dL    BUN 24 (H) 6 - 20 MG/DL    Creatinine 0.89 0.55 - 1.02 MG/DL    BUN/Creatinine ratio 27 (H) 12 - 20      GFR est AA >60 >60 ml/min/1.73m2    GFR est non-AA >60 >60 ml/min/1.73m2    Calcium 8.2 (L) 8.5 - 10.1 MG/DL    Bilirubin, total 0.3 0.2 - 1.0 MG/DL    ALT (SGPT) 26 12 - 78 U/L    AST (SGOT) 20 15 - 37 U/L    Alk. phosphatase 135 (H) 45 - 117 U/L    Protein, total 6.5 6.4 - 8.2 g/dL    Albumin 2.1 (L) 3.5 - 5.0 g/dL    Globulin 4.4 (H) 2.0 - 4.0 g/dL    A-G Ratio 0.5 (L) 1.1 - 2.2         Telemetry: normal sinus rhythm, MAT, PAF      Assessment:     Active Problems:    Acute exacerbation of chronic obstructive pulmonary disease (COPD) ([facility] (2/15/2018)      Benzodiazepine abuse (2/17/2018)      Acute MI ()        Plan:     1. Transient a. Fib: most likely due to underlying COPD and hypoxia. Normal LVEF. Increasing dose of BB due to short burst of PAF/MAT overnight. Monitor.     2. H/o CAD: no further chest pain. Stress test noted. Continue current meds. F/u with Dr. Emi Stoddard once dc. Will s/o and follow as needed for now. Please call if can be of any further assistance.

## 2018-02-23 NOTE — PROGRESS NOTES
Hospitalist Progress Note    NAME: Patty Turk   :  1945   MRN:  [id number]       Interim Hospital Summary: 74 y.o. female whom presented on 2/15/2018 with      Assessment / Plan:  Acute COPD exacerbation due to upper respiratory tract infection  Sepsis  Leukocytosis  Aspiration PNA  Hx of Tobacco use  - CXR: New to bilateral airspace disease likely represents pneumonia. Will treat like aspiration PNA. Continue with Cefepime and Flagyl  - O2 Sat RA @ 95%   - continue with prednisone  - continue with xopenex (was changed due to tachycardia)  - continue with mucinex (needs refills for inhalers and needs new PCP on discharge)  - continue with nicotine patch  - WBC down to 35 from 36    Chest Pain (resolved)  Elevated troponin upon admission  Paroxysmal atrial fibrillation  - cardiology following; no further chest pain. Last stent placement in . Stress test result pending  - continue with ASA and metoprolol  - remain in junctional rhythm with rate of 80 (OR interval 0.1)  - normal Echo. Stress test revealed no evidence of MI      Acute encephalopathy   HX Clonazepam abuse  - pt is much more alert and orient x self, time, and able to carry out conversation. If patient is continue to improve like this then we may not need to concern about capacity of decision making on her care. - EEG to be done. - Pt doesn't have any close family members who are involved in her care or in her life. Arabella Macdonald has been provided some medical information. As of now Arabella Macdonald is the most cloest person to patient. Arabella Macdonald is pt's landlord for the past year who helps pt with cooking and providing basic needs. Prior to the admission, pt was able to cook and take care of herself. Arabella Macdonald did share that pt has a history of taking clonazepam,  Nyquil, and sleeping pill frequently. Pt told Arabella Macdonald that those medication makes her feel better.  Arabella Macdonald would not want pt to know that she provided us with pt's medical information.  - Head CT:  No acute intracranial findings and no significant change since previous study. Chronic lacunar infarcts and white matter hypodensity consistent with chronic  small vessel ischemic change.  - haldol prn agitation  - will consider pscy consult if need  - continue w/ aspirin, echo unremarkable      Diarrhea resolved    CAD, HTN, HLD  -continue asprin, BB, statin      Code Status: dnr  Surrogate Decision Maker: Nikki Jimenez (landlor for the past one year)      DVT Prophylaxis: heparin          Recommended Disposition: rehab        Subjective:     Chief Complaint / Reason for Physician Visit  \"I feel better\". Discussed with RN events overnight. Review of Systems:  Symptom Y/N Comments  Symptom Y/N Comments   Fever/Chills n   Chest Pain n    Poor Appetite    Edema     Cough    Abdominal Pain     Sputum    Joint Pain     SOB/HANDLEY n   Pruritis/Rash     Nausea/vomit n   Tolerating PT/OT     Diarrhea    Tolerating Diet     Constipation    Other       Could NOT obtain due to:      Objective:     VITALS:   Last 24hrs VS reviewed since prior progress note. Most recent are:  Patient Vitals for the past 24 hrs:   Temp Pulse Resp BP SpO2   02/23/18 1132 (!) 100.8 °F (38.2 °C) (!) 102 20 144/74 94 %   02/23/18 0755 99.4 °F (37.4 °C) 97 18 122/66 98 %   02/23/18 0746 - - - - 96 %   02/23/18 0356 - - - - 97 %   02/23/18 0259 (!) 100.9 °F (38.3 °C) 96 20 148/74 97 %   02/22/18 2352 - - - - 95 %   02/22/18 2245 98.8 °F (37.1 °C) 88 20 119/60 100 %   02/22/18 1952 - - - - 97 %   02/22/18 1938 98 °F (36.7 °C) 89 20 110/51 98 %   02/22/18 1524 97.8 °F (36.6 °C) 91 20 127/74 97 %   02/22/18 1508 - - - - 96 %       Intake/Output Summary (Last 24 hours) at 02/23/18 1438  Last data filed at 02/23/18 0755   Gross per 24 hour   Intake             1340 ml   Output                0 ml   Net             1340 ml        PHYSICAL EXAM:  General: Underweight, Alert, cooperative, no acute distress    EENT:  EOMI. Anicteric sclerae.  MMM  Resp:  Clear in apex with decreased breath sounds at bases. no wheezing or rales. No accessory muscle use  CV:  Regular  rhythm,  No edema  GI:  Soft, Non distended, Non tender.  +Bowel sounds  Neurologic:  Alert and oriented X 2, normal speech,   Psych:   Good insight. Not anxious nor agitated  Skin:  No rashes. No jaundice    Reviewed most current lab test results and cultures  YES  Reviewed most current radiology test results   YES  Review and summation of old records today    NO  Reviewed patient's current orders and MAR    YES  PMH/SH reviewed - no change compared to H&P  ________________________________________________________________________  Care Plan discussed with:    Comments   Patient y    Family      RN y    Care Manager y    Consultant                       y Multidiciplinary team rounds were held today with , nursing, pharmacist and clinical coordinator. Patient's plan of care was discussed; medications were reviewed and discharge planning was addressed. ________________________________________________________________________  Total NON critical care TIME:  30   Minutes    Total CRITICAL CARE TIME Spent:   Minutes non procedure based      Comments   >50% of visit spent in counseling and coordination of care     ________________________________________________________________________  Jaylen Chavis NP     Procedures: see electronic medical records for all procedures/Xrays and details which were not copied into this note but were reviewed prior to creation of Plan. LABS:  I reviewed today's most current labs and imaging studies.   Pertinent labs include:  Recent Labs      02/23/18   0306  02/22/18   0153  02/21/18   0037   WBC  35.3*  36.9*  33.2*   HGB  10.1*  10.0*  10.2*   HCT  33.1*  32.1*  33.6*   PLT  339  425*  527*     Recent Labs      02/23/18   0306  02/22/18   0153  02/21/18   0037   NA  137  136  137   K  3.1*  3.5  3.8   CL  106  103  105   CO2  23  23  24   GLU  86  135*  124*   BUN 21*  24*  25*   CREA  0.81  0.89  0.85   CA  8.3*  8.2*  9.0   MG  2.0  1.9  2.1   ALB  2.0*  2.1*  2.5*   TBILI  0.4  0.3  0.5   SGOT  22  20  21   ALT  23  26  31       Signed: )Juliann Chand, NP

## 2018-02-23 NOTE — PROGRESS NOTES
Problem: Dysphagia (Adult)  Goal: *Acute Goals and Plan of Care (Insert Text)  2/21/2018  Speech path  1. Pt will tolerate full liquids with no overt s/s of aspiration. 2. Pt will tolerate solids with no overt s/s of aspiration. Speech language pathology dysphagia treatment  Patient: Zabrina Oro (59 y.o. female)  Date: 2/23/2018  Diagnosis: Acute exacerbation of chronic obstructive pulmonary disease (COPD) ([facility]) <principal problem not specified>       Precautions:  Contact, Fall, Bed Alarm    ASSESSMENT:  Pt is confused and will accept only Glucerna this visit although ice cream and crackers are offered. She drank the liquids with no difficulty. Oral and pharyngeal swallow was wnl with liquids. Progression toward goals:  []         Improving appropriately and progressing toward goals  []         Improving slowly and progressing toward goals  []         Not making progress toward goals and plan of care will be adjusted     PLAN:  Recommendations and Planned Interventions: Will not recommend advancing diet as she consistently refuses solids. Purees/thins are all she will take. Recommend pushing Glucerna   Patient continues to benefit from skilled intervention to address the above impairments. Continue treatment per established plan of care. Discharge Recommendations:  None     SUBJECTIVE:   Patient stated she did not want the ice cream offered or the charli cracker    OBJECTIVE:   Cognitive and Communication Status:  Neurologic State: Alert, Confused  Orientation Level: Oriented to person  Cognition: Decreased attention/concentration  Perception: Appears intact  Perseveration: No perseveration noted  Safety/Judgement: Awareness of environment  Dysphagia Treatment:  Oral Assessment:     P.O.  Trials:     Vocal quality prior to P.O.:                                                                               Pain:  Pain Scale 1: Numeric (0 - 10)  Pain Intensity 1: 0     After treatment:   [] Patient left in no apparent distress sitting up in chair  [x]              Patient left in no apparent distress in bed  [x]              Call bell left within reach  [x]              Nursing notified  []              Caregiver present  []              Bed alarm activated    COMMUNICATION/EDUCATION:       The patients plan of care including recommendations, planned interventions, and recommended diet changes were discussed with: Registered Nurse. []              Posted safety precautions in patient's room.     TONI Lacy  Time Calculation: 10 mins

## 2018-02-23 NOTE — PROGRESS NOTES
Problem: Falls - Risk of  Goal: *Absence of Falls  Document Sreedhar Fall Risk and appropriate interventions in the flowsheet.    Outcome: Progressing Towards Goal  Fall Risk Interventions:  Mobility Interventions: Bed/chair exit alarm, Communicate number of staff needed for ambulation/transfer, Mechanical lift, OT consult for ADLs    Mentation Interventions: Adequate sleep, hydration, pain control, Bed/chair exit alarm, Door open when patient unattended, Increase mobility, More frequent rounding, Reorient patient, Room close to nurse's station, Toileting rounds, Update white board    Medication Interventions: Bed/chair exit alarm, Evaluate medications/consider consulting pharmacy, Patient to call before getting OOB, Teach patient to arise slowly    Elimination Interventions: Bed/chair exit alarm, Call light in reach, Elevated toilet seat, Patient to call for help with toileting needs, Toilet paper/wipes in reach, Toileting schedule/hourly rounds    History of Falls Interventions: Bed/chair exit alarm, Consult care management for discharge planning, Door open when patient unattended, Evaluate medications/consider consulting pharmacy, Investigate reason for fall, Room close to nurse's station

## 2018-02-23 NOTE — PROGRESS NOTES
Bedside shift change report given to [facility] (oncoming nurse) by Bobbi Frias (offgoing nurse). Report included the following information SBAR and Kardex. SHIFT SUMMARY:            [facility] NURSING NOTE   Admission Date 2/15/2018   Admission Diagnosis Acute exacerbation of chronic obstructive pulmonary disease (COPD) ([address]   Consults IP CONSULT TO HOSPITALIST  IP CONSULT TO CARDIOLOGY  IP CONSULT TO NEUROLOGY      Cardiac Monitoring [x] Yes [] No      Purposeful Hourly Rounding [x] Yes    Sreedhar Score Total Score: 5   Sreedhar score 3 or > [x] Bed Alarm [] Avasys [] 1:1 sitter [] Patient refused (Place signed refusal form in chart)   Sal Score Sal Score: 16   Sal score 14 or < [] PMT consult [] Wound Care consult    []  Specialty bed  [] Nutrition consult      Influenza Vaccine Received Flu Vaccine for Current Season (usually Sept-March): Yes           Oxygen needs? [x] Room air Oxygen @  []1L    []2L    []3L   []4L    []5L   []6L     Use home O2? [] Yes [x] No  Perform O2 challenge test using  smartphrase (.Homeoxygen)      Last bowel movement Last Bowel Movement Date: 02/21/18      Urinary Catheter             LDAs               Peripheral IV 08/02/17 Right Arm (Active)       Peripheral IV 02/21/18 Left Forearm (Active)   Site Assessment Clean, dry, & intact 2/23/2018  3:21 AM   Phlebitis Assessment 0 2/23/2018  3:21 AM   Infiltration Assessment 0 2/23/2018  3:21 AM   Dressing Status Clean, dry, & intact 2/23/2018  3:21 AM   Dressing Type Transparent 2/23/2018  3:21 AM   Hub Color/Line Status Blue 2/22/2018  8:00 AM   Action Taken Blood drawn 2/21/2018 12:44 AM                         Readmission Risk Assessment Tool Score High Risk            23       Total Score        3 Has Seen PCP in Last 6 Months (Yes=3, No=0)    3 Patient Length of Stay (>5 days = 3)    9 Pt.  Coverage (Medicare=5 , Medicaid, or Self-Pay=4)    8 Charlson Comorbidity Score (Age + Comorbid Conditions)        Criteria that do not apply: . Living with Significant Other. Assisted Living. LTAC. SNF.  or   Rehab    IP Visits Last 12 Months (1-3=4, 4=9, >4=11)       Expected Length of Stay 3d 19h   Actual Length of Stay 8

## 2018-02-23 NOTE — PROGRESS NOTES
Bedside and Verbal shift change report given to [facility] (oncoming nurse) by Malka Mobley RN (offgoing nurse). Report included the following information SBAR. SHIFT SUMMARY:            [facility] NURSING NOTE   Admission Date 2/15/2018   Admission Diagnosis Acute exacerbation of chronic obstructive pulmonary disease (COPD) ([address]   Consults IP CONSULT TO HOSPITALIST  IP CONSULT TO CARDIOLOGY  IP CONSULT TO NEUROLOGY      Cardiac Monitoring [] Yes [] No      Purposeful Hourly Rounding [] Yes    Sreedhar Score Total Score: 5   Sreedhar score 3 or > [x] Bed Alarm [] Avasys [] 1:1 sitter [] Patient refused (Place signed refusal form in chart)   Sal Score Sal Score: 16   Asl score 14 or < [] PMT consult [] Wound Care consult    []  Specialty bed  [] Nutrition consult      Influenza Vaccine Received Flu Vaccine for Current Season (usually Sept-March): Yes           Oxygen needs? [x] Room air Oxygen @  []1L    []2L    []3L   []4L    []5L   []6L     Use home O2? [] Yes [] No  Perform O2 challenge test using  smartphrase (.Homeoxygen)      Last bowel movement Last Bowel Movement Date: 02/21/18      Urinary Catheter             LDAs               Peripheral IV 08/02/17 Right Arm (Active)       Peripheral IV 02/21/18 Left Forearm (Active)   Site Assessment Clean, dry, & intact 2/22/2018  8:00 AM   Phlebitis Assessment 0 2/22/2018  8:00 AM   Infiltration Assessment 0 2/22/2018  8:00 AM   Dressing Status Clean, dry, & intact 2/22/2018  8:00 AM   Dressing Type Transparent 2/22/2018  8:00 AM   Hub Color/Line Status Blue 2/22/2018  8:00 AM   Action Taken Blood drawn 2/21/2018 12:44 AM                         Readmission Risk Assessment Tool Score High Risk            23       Total Score        3 Has Seen PCP in Last 6 Months (Yes=3, No=0)    3 Patient Length of Stay (>5 days = 3)    9 Pt.  Coverage (Medicare=5 , Medicaid, or Self-Pay=4)    8 Charlson Comorbidity Score (Age + Comorbid Conditions)        Criteria that do not apply: . Living with Significant Other. Assisted Living. LTAC. SNF.  or   Rehab    IP Visits Last 12 Months (1-3=4, 4=9, >4=11)       Expected Length of Stay 3d 19h   Actual Length of Stay 7

## 2018-02-23 NOTE — PROGRESS NOTES
CM spoke with NP, pt is much more alert and oriented today. CM to speak with pt regarding assessment and discharge plan for SNF recommendations. 10:15AM  Pt is a 75 yo  female admitted on 2/15/18 for acute exacerbation of COPD. Pt lives alone in a two-story house in [place]. Pt reports independent in ADLs/IADLs to include driving. Pt has no reported history of HH, SNF, or acute inpatient rehab. Pt has no reported DME at home. Pt likely will need transport at discharge pending disposition. Pt to transport self or use friend Clinton Womack for follow-up care appointments. Pt unable to remember preferred Rx. CM met with pt to verify demographic info and complete initial assessment, dc planning. Pt is more alert and oriented at this time (self, situation, time, place). CM discussed PT/OT recommendations for SNF at discharge. Pt is agreeable at this time. CM provided SNF list. FOC offered. Pt selected [facility], [facility], or [facility] for placement. FOC completed and placed on bedside chart. CM sent referrals via CC link, awaiting acceptance.      KAMALJIT Sandoval [employer] in Social Work, [address] [place] [address]  [phone]

## 2018-02-23 NOTE — PROGRESS NOTES
SHIFT SUMMARY:  1457: Patient had a fever of 100.8; prn tylenol given    1900: Bedside shift change report given to [address] [place] [address]  (oncoming nurse) by Mercedes Obregon (offgoing nurse). Report included the following information SBAR, Kardex, Intake/Output, MAR and Recent Results.

## 2018-02-24 NOTE — PROGRESS NOTES
Hospitalist Progress Note    NAME: Armida Romero   :  1945   MRN:  [id number]       Interim Hospital Summary: 74 y.o. female whom presented on 2/15/2018 with      Assessment / Plan:  Acute COPD exacerbation due to upper respiratory tract infection  Sepsis  Leukocytosis  Aspiration PNA  Hx of Tobacco use  - persistent leukocytosis (WBC 42.9 from 36.9) in absence of febrile episode with clinical improvement. No dysuria. No obvious s/sx of infection other than PNA.  - PA/Lat chest x-ray revealed Worsening bilateral upper lung patchy multifocal airspace disease with a suggestion of central cavitation within several airspace opacities. - chest CT ordered; d/c flagyl, add vanc, and continue with Cefepime. - Blood cx  &  no growth. No diarrhea. - CXR (): New to bilateral airspace disease likely represents pneumonia. Will treat like aspiration PNA.   - O2 Sat RA @ 95%   - prednisone has been d/c'd  - continue with xopenex (was changed due to tachycardia). Started on [place]. Pulmonary toilet  - continue with mucinex (needs refills for inhalers and needs new PCP on discharge)  - continue with nicotine patch     Chest Pain (resolved)  Elevated troponin upon admission  Paroxysmal atrial fibrillation  - cardiology following; no further chest pain. Last stent placement in .   - continue with ASA and metoprolol  - remain in junctional rhythm with rate of 80 (NC interval 0.1)  - normal Echo. Stress test revealed no evidence of MI      Acute encephalopathy   HX Clonazepam abuse  - pt is much more alert and orient x self, time, and able to carry out conversation. If patient is continue to improve like this then we may not need to concern about capacity of decision making on her care. - EEG to be done. - Pt doesn't have any close family members who are involved in her care or in her life. Jarrett Hernandez has been provided some medical information. As of now Jarrett Hernandez is the most cloest person to patient.  Jarrett Hernandez is pt's landlord for the past year who helps pt with cooking and providing basic needs. Prior to the admission, pt was able to cook and take care of herself. Carlos nelson did share that pt has a history of taking clonazepam,  Nyquil, and sleeping pill frequently. Pt told Carlos nelson that those medication makes her feel better. Carlos nelson would not want pt to know that she provided us with pt's medical information.  - Head CT:  No acute intracranial findings and no significant change since previous study. Chronic lacunar infarcts and white matter hypodensity consistent with chronic  small vessel ischemic change.  - haldol prn agitation  - will consider pscy consult if need  - continue w/ aspirin, echo unremarkable      Diarrhea resolved    CAD, HTN, HLD  -continue asprin, BB, statin      Code Status: dnr  Surrogate Decision Maker: Carlos nelson (landlord for the past one year)      DVT Prophylaxis: heparin          Recommended Disposition: rehab  Pt is alert and orient to self, place, and time. Mental status improved significantly over the past couple days. Pt would like to to rehab before return to her previous living status.       Subjective:     Chief Complaint / Reason for Physician Visit  \"I feel ok\". Discussed with RN events overnight. Review of Systems:  Symptom Y/N Comments  Symptom Y/N Comments   Fever/Chills n   Chest Pain n    Poor Appetite    Edema     Cough    Abdominal Pain     Sputum    Joint Pain     SOB/HANDLEY n   Pruritis/Rash     Nausea/vomit n   Tolerating PT/OT     Diarrhea    Tolerating Diet     Constipation    Other       Could NOT obtain due to:      Objective:     VITALS:   Last 24hrs VS reviewed since prior progress note.  Most recent are:  Patient Vitals for the past 24 hrs:   Temp Pulse Resp BP SpO2   02/24/18 0753 99.4 °F (37.4 °C) (!) 113 16 (!) 120/92 94 %   02/24/18 0254 99.3 °F (37.4 °C) (!) 104 16 130/68 99 %   02/24/18 0202 - - - - 94 %   02/23/18 2257 99.1 °F (37.3 °C) 95 16 134/76 100 %   02/23/18 2039 - - - - 96 %   02/23/18 1906 98.6 °F (37 °C) 96 18 132/70 96 %   02/23/18 1500 98.9 °F (37.2 °C) 97 20 109/63 96 %   02/23/18 1458 - - - - 96 %   02/23/18 1132 (!) 100.8 °F (38.2 °C) (!) 102 20 144/74 94 %       Intake/Output Summary (Last 24 hours) at 02/24/18 0852  Last data filed at 02/23/18 1500   Gross per 24 hour   Intake              120 ml   Output                0 ml   Net              120 ml        PHYSICAL EXAM:  General: Underweight. Alert, cooperative, no acute distress    EENT:  EOMI. Anicteric sclerae. MMM  Resp:  CTA in apex with diminished breath sounds at bases, no wheezing or rales. No accessory muscle use  CV:  Regular  rhythm,  No edema  GI:  Soft, Non distended, Non tender.  +Bowel sounds  Neurologic:  Alert and oriented X 3, normal speech,   Psych:   Fair insight. Not anxious nor agitated  Skin:  No rashes. No jaundice    Reviewed most current lab test results and cultures  YES  Reviewed most current radiology test results   YES  Review and summation of old records today    NO  Reviewed patient's current orders and MAR    YES  PMH/ reviewed - no change compared to H&P  ________________________________________________________________________  Care Plan discussed with:    Comments   Patient y    Family      RN y    Care Manager y    Consultant                        Multidiciplinary team rounds were held today with , nursing, pharmacist and clinical coordinator. Patient's plan of care was discussed; medications were reviewed and discharge planning was addressed.      ________________________________________________________________________  Total NON critical care TIME:  30   Minutes    Total CRITICAL CARE TIME Spent:   Minutes non procedure based      Comments   >50% of visit spent in counseling and coordination of care     ________________________________________________________________________  Mariella Prather NP     Procedures: see electronic medical records for all procedures/Xrays and details which were not copied into this note but were reviewed prior to creation of Plan. LABS:  I reviewed today's most current labs and imaging studies.   Pertinent labs include:  Recent Labs      02/24/18 0431 02/23/18 0306 02/22/18 0153   WBC  42.9*  35.3*  36.9*   HGB  9.6*  10.1*  10.0*   HCT  30.6*  33.1*  32.1*   PLT  340  339  425*     Recent Labs      02/24/18 0431 02/23/18 0306 02/22/18 0153   NA  140  137  136   K  3.6  3.1*  3.5   CL  109*  106  103   CO2  24  23  23   GLU  89  86  135*   BUN  20  21*  24*   CREA  0.92  0.81  0.89   CA  8.0*  8.3*  8.2*   MG  1.9  2.0  1.9   ALB   --   2.0*  2.1*   TBILI   --   0.4  0.3   SGOT   --   22  20   ALT   --   23  26       Signed: )Juliann Chand, NP

## 2018-02-24 NOTE — PROGRESS NOTES
Problem: Falls - Risk of  Goal: *Absence of Falls  Document Sreedhar Fall Risk and appropriate interventions in the flowsheet.    Outcome: Progressing Towards Goal  Fall Risk Interventions:  Mobility Interventions: Bed/chair exit alarm, Communicate number of staff needed for ambulation/transfer, Mechanical lift, OT consult for ADLs, Patient to call before getting OOB, PT Consult for mobility concerns, PT Consult for assist device competence    Mentation Interventions: Adequate sleep, hydration, pain control, Bed/chair exit alarm, Door open when patient unattended, Evaluate medications/consider consulting pharmacy, Eyeglasses and hearing aids    Medication Interventions: Bed/chair exit alarm, Evaluate medications/consider consulting pharmacy, Patient to call before getting OOB, Teach patient to arise slowly, Utilize gait belt for transfers/ambulation    Elimination Interventions: Bed/chair exit alarm, Call light in reach, Elevated toilet seat, Patient to call for help with toileting needs, Toilet paper/wipes in reach, Toileting schedule/hourly rounds, Urinal in reach    History of Falls Interventions: Bed/chair exit alarm, Door open when patient unattended, Evaluate medications/consider consulting pharmacy, Investigate reason for fall, Room close to nurse's station, Utilize gait belt for transfer/ambulation

## 2018-02-24 NOTE — CONSULTS
Initial Pulmonary / Critical Care Consultation    Assessment / Plan:      Patchy multilobar pneumonia - in patient with known aspiration risk and underlying copd - feels better clinically but WBC elevated. Some suggestion of hematogenous spread and cavitation (may be secondary to underlying severe appearing emphysema rather than necrotizing process but agree with addition of vancomycin. Blood cultures have been negative    Leukocytosis    COPD / emphysema      Tobacco abuse    CAD    DM    DNR    --continue abx and follow clinically (on maxipime, flagyl and vanc) pt is pcn allergic  --aspiration precautions with SLP following  --follow up blood cultures are pending  --repeat sputum culture  --follow for future radiographic resolution    Group to follow on Monday    History / Subjective:  Reason:  pneumonia  Requesting Provider:  Dr Maryelizabeth Cranker is a 74 y.o.  female who  has a past medical history of Acute MI; Benzodiazepine abuse; CAD (coronary artery disease); COPD; Diabetes ([facility] [address] Emphysema; Essential hypertension; Gastrointestinal disorder; Hyperkalemia; Hyperlipemia; and Hypertension. admitted 2/15/2018 with pneumonia. Pt found to have aspiration risk and has been on abx. She clinically says she feels much better and is afebrile. However, her WBC is rising and her CXR has shown increase in primarily upper lobe asdz. Denies change in sputum. No fevers or chills. No chest pain.   No hemoptysis    Allergies   Allergen Reactions    Iodinated Contrast- Oral And Iv Dye Anaphylaxis    Penicillin G Hives    Ciprofloxacin Unable to Obtain    Lyrica [Pregabalin] Other (comments)     lossing balance     Past Medical History:   Diagnosis Date    Acute MI     Benzodiazepine abuse     CAD (coronary artery disease)     COPD     Diabetes ([facility] [address]     Emphysema     Essential hypertension     Gastrointestinal disorder     ulcer    Hyperkalemia     Hyperlipemia     Hypertension       Past Surgical History:   Procedure Laterality Date    CORONARY STENT SINGLE W/PTCA      HX CORONARY STENT PLACEMENT      HX HEART CATHETERIZATION        Prior to Admission medications    Medication Sig Start Date End Date Taking? Authorizing Provider   metoprolol tartrate (LOPRESSOR) 25 mg tablet Take 1 Tab by mouth two (2) times a day. 18  Yes Nupur Wallis MD   aspirin 81 mg chewable tablet Take 1 Tab by mouth daily. 18  Yes Nupur Wallis MD   predniSONE (DELTASONE) 10 mg tablet Take 4 tabs daily for 3 days, then take 3 tabs daily for 3 days, then take 2 tabs daily for 3 days, then take 1 tab daily for 3 days, then stop 18  Yes Nupur Wallis MD   albuterol (PROVENTIL HFA, VENTOLIN HFA, PROAIR HFA) 90 mcg/actuation inhaler Take 2 Puffs by inhalation every four (4) hours as needed for Shortness of Breath. 18  Yes Nupur Wallis MD   oxyCODONE IR (ROXICODONE) 10 mg tab immediate release tablet Take 10 mg by mouth every four (4) hours as needed for Pain. Yes Historical Provider   pravastatin (PRAVACHOL) 80 mg tablet TAKE 1 TABLET BY MOUTH ONCE DAILY 7/6/15  Yes RAMON Hicks   ASPIRIN/ACETAMINOPHEN/CAFFEINE (EXCEDRIN EXTRA STRENGTH PO) Take 1 Tab by mouth daily as needed for Pain. Yes Historical Provider   clonazepam (KLONOPIN) 0.5 mg tablet Take 0.5 mg by mouth four (4) times daily. 11   Phys MD Abigail      No family history on file. Social History   Substance Use Topics    Smoking status: Current Every Day Smoker     Packs/day: 0.50     Years: 0.00     Types: Cigarettes    Smokeless tobacco: Never Used    Alcohol use No      ROS:  A comprehensive review of systems was negative except for that written in the HPI. Objective:  No data found.     Temp (24hrs), Av.1 °F (37.3 °C), Min:98.6 °F (37 °C), Max:99.4 °F (37.4 °C)    CVP:          Intake/Output Summary (Last 24 hours) at 18 1506  Last data filed at 18 0753   Gross per 24 hour   Intake              120 ml   Output 0 ml   Net              120 ml     Blood Sugar:  Glucose   Date Value Ref Range Status   02/24/2018 89 65 - 100 mg/dL Final   02/23/2018 86 65 - 100 mg/dL Final   02/22/2018 135 (H) 65 - 100 mg/dL Final   02/21/2018 124 (H) 65 - 100 mg/dL Final   02/19/2018 128 (H) 65 - 100 mg/dL Final     Exam:  Thin wf in NAD  Alert  MM dry  Anicteric  Trachea midline  No accessory use  Kyphosis  Distant bs bilaterally  RRR  Soft / NT / + BS  Warm and dry  Trace edema  No cyanosis or clubbing    Lab data was reviewed. Radiology images were independently viewed and available reports were reviewed.     CXR:  Flat diaphragms, bilateral asdz with some suggestion of cavitation    CT - emphysema with patchy bilateral asdz    Lab:  Recent Labs      02/24/18   0431  02/23/18   0306  02/22/18   0153   WBC  42.9*  35.3*  36.9*   HGB  9.6*  10.1*  10.0*   PLT  340  339  425*   NA  140  137  136   K  3.6  3.1*  3.5   CL  109*  106  103   CO2  24  23  23   BUN  20  21*  24*   CREA  0.92  0.81  0.89   GLU  89  86  135*   CA  8.0*  8.3*  8.2*   MG  1.9  2.0  1.9   TBILI   --   0.4  0.3   SGOT   --   22  20     All Micro Results     Procedure Component Value Units Date/Time    CULTURE, MRSA [[id number]] Collected:  02/24/18 1239    Order Status:  Completed Specimen:  Nares Updated:  02/24/18 1312    CULTURE, STOOL [[id number]] Collected:  02/23/18 1156    Order Status:  Completed Specimen:  Stool Updated:  02/24/18 1135     Special Requests: NO SPECIAL REQUESTS        Campylobacter antigen NEGATIVE        Culture result:         NO ROUTINE ENTERIC PATHOGENS ISOLATED INCLUDING SALMONELLA, SHIGELLA, YERSINIA, VIBRIO OR SHIGA TOXIN PRODUCING E. COLI , SO FAR      NO COLIFORMS ISOLATED                 HEAVY MIXED GRAM POSITIVE FLORENTINO ISOLATED , ONLY    CULTURE, BLOOD, PAIRED [[id number]] Collected:  02/24/18 0808    Order Status:  Completed Specimen:  Blood Updated:  02/24/18 0823    OVA & PARASITES, STOOL [[id number]]     Order Status: Sent Specimen:  Stool from Stool     C. DIFFICILE (DNA) [[id number]]     Order Status:  Canceled     CULTURE, RESPIRATORY/SPUTUM/BRONCH Iniguez Eagles STAIN [[id number]] Collected:  02/21/18 1500    Order Status:  Canceled Specimen:  Sputum from Sputum     INFLUENZA A & B AG (RAPID TEST) [[id number]]     Order Status:  Canceled Specimen:  Nasopharyngeal from Nasal washing     CULTURE, BLOOD, PAIRED [[id number]] Collected:  02/15/18 1132    Order Status:  Completed Specimen:  Blood Updated:  02/20/18 0803     Special Requests: NO SPECIAL REQUESTS        Culture result: NO GROWTH 5 DAYS       CULTURE, RESPIRATORY/SPUTUM/BRONCH Iniguez Eagles STAIN [[id number]] Collected:  02/18/18 1415    Order Status:  Canceled Specimen:  Sputum from Sputum     CULTURE, MRSA [[id number]]     Order Status:  Canceled Specimen:  Goran Mckinley MD

## 2018-02-24 NOTE — PROGRESS NOTES
Pharmacy Automatic Renal Dosing Protocol - Antimicrobials    Indication for Antimicrobials: Aspiration PNA / HAP    Current Regimen of Each Antimicrobial:  Cefepime 2 g IV every 12 hours (Start Date ; Day # 7)  Metronidazole 500 mg every 12 hours (Start Date ; Day #4/7)  Vancomycin 1000 mg IV load followed by 750 mg IV 18 hours (Start Date ; Day #1)    Previous Antimicrobial Therapy:  Azithromycin -    Significant Cultures:   2/15 Blood: NGTD x 5 days- Final   Stool: pending   Blood: pending    Paralysis, amputations, malnutrition: None documented     Labs:  Recent Labs      18   0431  18   0306  18   0153   CREA  0.92  0.81  0.89   BUN  20  21*  24*   WBC  42.9*  35.3*  36.9*     Temp (24hrs), Av °F (37.2 °C), Min:98.6 °F (37 °C), Max:99.4 °F (37.4 °C)      Creatinine Clearance (mL/min) or Dialysis: 38 mL/min   No results found for: PCT    Impression/Plan: Will order vancomycin 1000 mg IV load followed by 750 mg IV every 18 hours for an estimated trough of 16.5 mcg/mL. Will continue current regimen for cefepime and metronidazole. Pharmacy will follow daily and adjust medications as appropriate for renal function and/or serum levels. Thank you,  Isi Souza, PHARMD      Recommended duration of therapy  http://[facility]/Pharmacy/Clinical%20Companion/Duration%20of%20ABX%20therapy. docx    Renal Dosing  http://[facility]/[place]/[facility]/Pharmacy/Clinical%20Companion/Renal%[id number]. pdf

## 2018-02-24 NOTE — PROGRESS NOTES
SHIFT SUMMARY:  0800: Patient has no episodes of diarrhea    1200: Patient aspirated on thin liquids and regular diet; MD notified; verbal orders received for diet modification    1330: Nurse called [facility] pulmonary associate to speak with Quiana Mijares for a consult; Dr. Nadia Monzon is oncall    1400: Patient transported off unit for chest CT    1427: Patient transported back to the unit post test    1500: Dr. Nadia Monzon at patient's bedside    1900: Bedside shift change report given to SN Edilson/DOUG Palacios (oncoming nurse) by Sam Mcgregor (offgoing nurse). Report included the following information SBAR, Kardex, Intake/Output, MAR and Recent Results.

## 2018-02-25 NOTE — PROGRESS NOTES
Notified Dr Bard Kate of results. Orders for a IR consult for bronchial artery angiogram. IR nurse notified of consult. Stated that she would notify the physician on call. Also, notified Dr Martin Schafer of results, orders to transfuse 2 Units PRBCs & to hold NS while blood is infusing.

## 2018-02-25 NOTE — ROUTINE PROCESS
0630 Patient c/o SOB. Patient found to have coughed up blood with clots. Sats mid 90's on RA. PRN neb tx given and 2L of O2  placed on patient for comfort. Hospitalist on call paged. Awaiting call back.

## 2018-02-25 NOTE — PROGRESS NOTES
Pulmonary    Pt has had several episodes of hemoptysis today. No chest pain. Denies vomiting blood  Hgb has dropped several grams  She is currently receiving first of 2 units of PRBC's  She is alert and in no distress  She says that she understands and is ok with having us look into her airway to see if the site of bleeding can be determined    Have D/w IR and will be available if bronchial artery embolization was needed.   She has received a dose of solumedrol and will receive benadryl for pre-medication if she has to go to an angiographic procedure    Patient Vitals for the past 4 hrs:   BP Temp Pulse Resp SpO2   18 1645 94/49 99.3 °F (37.4 °C) (!) 101 (!) 37 100 %   18 1630 102/50 98.9 °F (37.2 °C) (!) 103 23 100 %   18 1622 96/51 97.8 °F (36.6 °C) (!) 102 23 100 %   18 1600 92/46 - (!) 105 23 100 %   18 1537 90/53 - (!) 103 20 100 %   18 1509 97/54 98.8 °F (37.1 °C) (!) 111 28 99 %   18 1433 101/46 99 °F (37.2 °C) (!) 111 28 98 %   Temp (24hrs), Av.7 °F (37.1 °C), Min:97.8 °F (36.6 °C), Max:100.2 °F (37.9 °C)    Intake/Output Summary (Last 24 hours) at 18 1725  Last data filed at 18 1702   Gross per 24 hour   Intake          1586.66 ml   Output                0 ml   Net          1586.66 ml     Alert  Thin  No accessory use  Dried blood in mouth  Coarse bs bilaterally  RRR  Soft NT  Warm and dry    Lab:  Recent Labs      18   1350  18   0725  18   0711  18   0114  18   0431  18   0306   WBC   --    --    --   44.4*  42.9*  35.3*   HGB  6.8*   --   8.3*  9.2*  9.6*  10.1*   PLT   --    --    --   306  340  339   NA   --    --    --   141  140  137   K   --    --    --   3.2*  3.6  3.1*   CL   --    --    --   110*  109*  106   CO2   --    --    --   21  24  23   BUN   --    --    --   27*  20  21*   CREA   --    --    --   1.07*  0.92  0.81   GLU   --    --    --   117*  89  86   CA   --    --    --   7.8*  8.0*  8.3* MG   --    --    --   1.9  1.9  2.0   TROIQ   --    --   0.04   --    --    --    CPK   --    --   36   --    --    --    TBILI   --    --    --   0.3   --   0.4   SGOT   --    --    --   20   --   22   LAC  3.0*  2.9*   --    --    --    --      CXR viewed - worsening bilateral asdz    Impression   Multi-lobar patchy pneumonia with hemoptysis with significant drop in hgb    COPD    Tobacco abuse    DNR    --Bronchoscopy to evaluate area of bleeding if possible  --on abx  --being transfused   --IR following    Aneesh Denny MD

## 2018-02-25 NOTE — PROGRESS NOTES
Problem: Falls - Risk of  Goal: *Absence of Falls  Document Sreedhar Fall Risk and appropriate interventions in the flowsheet.    Outcome: Progressing Towards Goal  Fall Risk Interventions:  Mobility Interventions: Bed/chair exit alarm, Patient to call before getting OOB    Mentation Interventions: Bed/chair exit alarm, Door open when patient unattended    Medication Interventions: Bed/chair exit alarm, Patient to call before getting OOB    Elimination Interventions: Call light in reach, Bed/chair exit alarm, Patient to call for help with toileting needs    History of Falls Interventions: Bed/chair exit alarm, Door open when patient unattended

## 2018-02-25 NOTE — PROGRESS NOTES
I explained to the pt about the needs of transfusion and bronchoscopy procedure. Patient agreed to receive transfusion of PRBC and  proceed with a diagnostic bronchoscopy to localize site of bleeding and possible bronchial artery embolization. Patient doesn't have any close family members. She requested not to contact her family nor her friend. We discussed once again honoring DNR/DNI status but agree to continue with current medical care.  dk

## 2018-02-25 NOTE — PROGRESS NOTES
Per [facility] charge nurse, pt no longer needs transfer to stepdown. Charge nurse to update order status.

## 2018-02-25 NOTE — PROGRESS NOTES
Bedside bronchoscopy performed by Dr. Geovanny Cruz. 100% non-rebreather placed for procedure. 5ml of 2% lidocaine instilled by MD above the cords. 6ml of 1% lidocaine instilled below the cords. Placed on 2 liters nasal cannula after procedure. No distress noted.

## 2018-02-25 NOTE — PROGRESS NOTES
02/25/18 1433   Vital Signs   Temp 99 °F (37.2 °C)   Temp Source Oral   Pulse (Heart Rate) (!) 111   Heart Rate Source Monitor   Cardiac Rhythm Sinus Tach   Resp Rate 28   O2 Sat (%) 98 %   Level of Consciousness Alert   /46   MAP (Calculated) (!) 64   BP 1 Method Automatic   BP 1 Location Right arm   BP Patient Position At rest   MEWS Score 4   MD notified; sepsis bundle initiated; patient transfer to CCU

## 2018-02-25 NOTE — PROGRESS NOTES
0830: Lab called for lactic acid of 2.9; NP notified; NS bolus still infusing    1100: Stool occult result came back positive; NP notified    32 059 515: TRANSFER - OUT REPORT:    Verbal report given to DOUG Burns(name) on [facility]  being transferred to CCU(unit) for change in patient condition(hematemesis)       Report consisted of patients Situation, Background, Assessment and   Recommendations(SBAR). Information from the following report(s) SBAR, Kardex, Intake/Output, MAR and Recent Results was reviewed with the receiving nurse. Lines:   Peripheral IV 08/02/17 Right Arm (Active)       Peripheral IV 02/24/18 Right Forearm (Active)   Site Assessment Clean, dry, & intact 2/25/2018  3:42 AM   Phlebitis Assessment 0 2/25/2018  3:42 AM   Infiltration Assessment 0 2/25/2018  3:42 AM   Dressing Status Clean, dry, & intact 2/25/2018  3:42 AM   Dressing Type Tape;Transparent 2/25/2018  3:42 AM   Hub Color/Line Status Blue 2/25/2018  3:42 AM        Opportunity for questions and clarification was provided.       Patient transported with:   Registered Nurse    1500: Patient transferred to CCU by CN and primary nurse

## 2018-02-25 NOTE — PROGRESS NOTES
[facility]  Bronchoscopy Report    Procedure: Diagnostic bronchoscopy. Indication: Hemoptysis    Consent/Treatment: Informed consent was obtained from the  patient after risks, benefits and alternatives were explained. Timeout verified the correct patient and correct procedure. Anesthesia:   Topical sedation to nares, mouth, and tracheobronchial tree with lidocaine  Moderate sedation with Fentanyl 50 mcg and Versed 2mg was used    Moderate (conscious) sedation was administered by the endoscopy nurse under the direct supervision of the endoscopist.  Heart rate, EKG, blood pressure, resp rate, adequacy of pulmonary ventilation, Oxygen saturation, and response to care were monitored. Total physician intraservice time was 15 minutes    Procedure Details:   -- The bronchoscope was introduced orally with use of a bite block. -- The vocal cords were found to be normal.  -- The trachea and angle were completely inspected and noted some old blood in the distal trachea and rul but.  -- The right-sided endobronchial anatomy was completely inspected and noted old blood in the RUL orifice that cleared with washing. RUL washings did not show obvious active bleeding. RML and RLL airways appeared to be normal  -- The left-sided endobronchial anatomy was completely inspected and was found to be normal.     Specimens:   Bronchial washings were sent for  microbiology, cytology, AFB smear and culture and fungal culture    Rapid On-Site Evaluation: n/a    Complications: none    Estimated Blood Loss: Minimal    Some old blood cleared from posterior pharynx, and distal trachea and RUL. No obvious source of bleeding noted.   ? If her source may be GI    --place on protonix and will also ask for GI to see  --Bronchial washings sent for bacteriology, fungal, afb and cytology studies    Matt Escobedo MD

## 2018-02-25 NOTE — PROGRESS NOTES
1400: TRANSFER - IN REPORT:    Verbal report received from Tony Chavez, [facility]  (name) on [facility]  being received from [facility] (unit) for urgent transfer      Report consisted of patients Situation, Background, Assessment and   Recommendations(SBAR). Information from the following report(s) SBAR, Kardex, Intake/Output, MAR and Recent Results was reviewed with the receiving nurse. Opportunity for questions and clarification was provided. Assessment completed upon patients arrival to unit and care assumed. 1500: Pt. Is now in her CCU room. Primary Nurse Ru Fowler and Jose R Mckeon RN performed a dual skin assessment on this patient. Pt. Has a very red sacrum, but it is blanchable. Will turn patient Q2 hours and apply a foam pad to sacrum. 1545: Pt. Is alert but not oriented  to time and has periods of confusion. Pt. Is not able to sign her own consent. Will attempt to obtain consent from Next of Kin. Pulses palpable. Patient is on 4 L's NC per Dr. Maldonado Sandoval due to patient's ABG results. Breath sounds diminished. Pt. Has not coughed up any blood since being in the CCU. Bowel sounds active. 1600: Spoke to Dr. Maldonado Sandoval and Dr. Katrina Felder after son Matti Gonsales  (Next of kin) refused to sign any consent. Dr. Katrina Felder and Rebeca Bolivar NP have inserted notes that state that this patient needs Blood Emergently. Will start blood soon. Dr. Katrina Felder spoke to IR. Bronchoscopy planned for this evening. 1624: First unit of blood now infusing. 1800: Bronchoscopy being completed now with Dr. Iveth Mcdonald and respiratory and nursing staff. Sedation given via verbal order during procedure. See MAR.    1850: First unit of blood completed. GI consult called. They are aware of her and will see her first thing in the morning. 1900: Report Given to DOUG Major.

## 2018-02-25 NOTE — PROGRESS NOTES
Hospitalist Progress Note    NAME: Brea Lehman   :  1945   MRN:  [id number]       Interim Hospital Summary: 74 y.o. female whom presented on 2/15/2018 with      Assessment / Plan:  Pt was seen @0700 per nursing request.  Pt started to c/o abdominal pain with nausea, vomited blood contained mucus content. SBP 70-80's and tachycardia. Afebrile. Repeat 8.3 from 0700 sample, it was 9 at 0100. Lactic acid 2.6, received 1L of NS bolus. We will continue with IV hydration NS with 20 meq KCL. WBC 44K. She is currently on Cefepime, Vanc which will cover aerobic, flagyl for anaerobic coverage. Zithromax was added to cover atypical coverage. EKG revealed ST. Trop 0.04. Pt agrees with current medical care as long as we honor her DNR/DNI status. Patient is alert and orient to self, place, and event processing, no longer encephalopathic. Acute COPD exacerbation due to upper respiratory tract infection  Sepsis   Leukocytosis, tachycardia, lactic acidosis  Aspiration PNA  Hx of Tobacco use  - persistent leukocytosis 44k. Afebrile. Received 1L of IV bolus. Will continue with IVF. Lactic acid 2.9. Will repeat at 2pm  - continue with cefepime, Vanc, flagyl, and zithromax  - pulmonology following;   - high resolution Ct revealed Multilobar pneumonia. - PA/Lat chest x-ray revealed Worsening bilateral upper lung patchy multifocal airspace disease with a suggestion of central cavitation within several airspace opacities. - Blood cx  &  no growth. No diarrhea. - CXR (): New to bilateral airspace disease likely represents pneumonia. Will treat like aspiration PNA.   - O2 Sat 95% @ 2L via n/c.  - prednisone has been d/c'd on   - continue with xopenex (was changed due to tachycardia). Continue with Breo.  Pulmonary toilet  - continue with mucinex (needs refills for inhalers and needs new PCP on discharge)  - continue with nicotine patch    ABD pain  N/V  - NPO except ice chips  - ABD acute series ordered  - antiemetic as need    Chest Pain (resolved)  Elevated troponin upon admission  Paroxysmal atrial fibrillation  - cardiology following; no further chest pain. Last stent placement in 2009.   - continue with ASA and metoprolol  - remain in junctional rhythm with rate of 80 (VT interval 0.1)  - normal Echo. Stress test revealed no evidence of MI    Vaginal candidasis  - clotrimazole vaginal cream ordered    Anemia, ? GIB  - H/H 8.3/26.7 from 9.2/29.7 within 8 hours of time span. [address] H/H @1400. Will consider GI consult. Heme (+) stool    Acute encephalopathy  (resolved)  HX Clonazepam abuse  - Pt has capacity to make her medical condition.  - Pt doesn't have any close family members who are involved in her care or in her life. Abram Mcnamara has been provided some medical information. As of now Isabelwu Mcnamara is the most cloest person to patient. Isabelwu Mcnamara is pt's landlord for the past year who helps pt with cooking and providing basic needs. Prior to the admission, pt was able to cook and take care of herself. Isabelwu Mcnamara did share that pt has a history of taking clonazepam,  Nyquil, and sleeping pill frequently. Pt told Abram Mcnamara that those medication makes her feel better. Abram Mcnamara would not want pt to know that she provided us with pt's medical information.  - Head CT:  No acute intracranial findings and no significant change since previous study. Chronic lacunar infarcts and white matter hypodensity consistent with chronic  small vessel ischemic change.  - haldol prn agitation  - will consider pscy consult if need  - continue w/ aspirin, echo unremarkable      Diarrhea resolved    CAD, HTN, HLD  -continue asprin, BB, statin      Code Status: dnr  Surrogate Decision Maker: Abram Naima (landlord for the past one year)  DVT Prophylaxis: SCD  Recommended Disposition: rehab  Pt is alert and orient to self, place, and time. Mental status improved significantly over the past couple days.  Pt would like to to rehab before return to her previous living status.    Pt requested not to discuss about her current medical condition with any family member or family friend. Subjective:     Chief Complaint / Reason for Physician Visit  \"I feel sick in my stocmach\". Discussed with RN events overnight. Review of Systems:  Symptom Y/N Comments  Symptom Y/N Comments   Fever/Chills n   Chest Pain n    Poor Appetite    Edema     Cough y   Abdominal Pain     Sputum    Joint Pain     SOB/HANDLEY    Pruritis/Rash     Nausea/vomit y   Tolerating PT/OT     Diarrhea n   Tolerating Diet     Constipation n   Other       Could NOT obtain due to:      Objective:     VITALS:   Last 24hrs VS reviewed since prior progress note. Most recent are:  Patient Vitals for the past 24 hrs:   Temp Pulse Resp BP SpO2   02/25/18 1046 - (!) 102 22 107/46 96 %   02/25/18 1002 - (!) 109 - 94/58 93 %   02/25/18 0910 - 97 - 95/55 100 %   02/25/18 0812 98 °F (36.7 °C) 98 20 94/55 98 %   02/25/18 0625 - - - - 96 %   02/25/18 0342 99.1 °F (37.3 °C) 95 20 116/67 95 %   02/25/18 0019 100.2 °F (37.9 °C) - - - -   02/24/18 2242 - (!) 102 20 119/70 95 %   02/24/18 1808 97.9 °F (36.6 °C) (!) 104 16 139/78 96 %   02/24/18 1554 98.4 °F (36.9 °C) (!) 101 16 122/89 95 %       Intake/Output Summary (Last 24 hours) at 02/25/18 1056  Last data filed at 02/24/18 1554   Gross per 24 hour   Intake              240 ml   Output                0 ml   Net              240 ml        PHYSICAL EXAM:  General: Ill appearing. Alert, cooperative, no acute distress    EENT:  EOMI. Anicteric sclerae. MMM  Resp:  Clear in apex with decreased breath sounds at bases. no wheezing or rales. No accessory muscle use  CV:  Regular  rhythm,  No edema  GI:  Soft, Non distended, diffuse tenderness.  +Bowel sounds  Neurologic:  Alert and oriented X 3, normal speech,   Psych:   Fair insight. Not anxious nor agitated  Skin:  No rashes.   No jaundice    Reviewed most current lab test results and cultures  YES  Reviewed most current radiology test results   YES  Review and summation of old records today    NO  Reviewed patient's current orders and MAR    YES  PMH/SH reviewed - no change compared to H&P  ________________________________________________________________________  Care Plan discussed with:    Comments   Patient y    Family      RN y    Care Manager     Consultant                       y Multidiciplinary team rounds were held today with , nursing, pharmacist and clinical coordinator. Patient's plan of care was discussed; medications were reviewed and discharge planning was addressed. ________________________________________________________________________  Total NON critical care TIME:  30 Minutes    Total CRITICAL CARE TIME Spent:   Minutes non procedure based      Comments   >50% of visit spent in counseling and coordination of care     ________________________________________________________________________  Jesu Preston NP     Procedures: see electronic medical records for all procedures/Xrays and details which were not copied into this note but were reviewed prior to creation of Plan. LABS:  I reviewed today's most current labs and imaging studies.   Pertinent labs include:  Recent Labs      02/25/18   0711  02/25/18 0114 02/24/18   0431 02/23/18   0306   WBC   --   44.4*  42.9*  35.3*   HGB  8.3*  9.2*  9.6*  10.1*   HCT  26.7*  29.7*  30.6*  33.1*   PLT   --   306  340  339     Recent Labs      02/25/18   0114  02/24/18   0431  02/23/18   0306   NA  141  140  137   K  3.2*  3.6  3.1*   CL  110*  109*  106   CO2  21  24  23   GLU  117*  89  86   BUN  27*  20  21*   CREA  1.07*  0.92  0.81   CA  7.8*  8.0*  8.3*   MG  1.9  1.9  2.0   ALB  1.5*   --   2.0*   TBILI  0.3   --   0.4   SGOT  20   --   22   ALT  17   --   23       Signed: )Juliann Chand, NP

## 2018-02-25 NOTE — PROGRESS NOTES
Respiratory therapist alerted nurse from room that patient had blood all over them. Patient states she was not sure if she vomited or coughed up the blood. Upon cleaning patient up, patient started coughing again and a large amount of bright red blood was expelled. Patient began getting very anxious. MD was paged and orders to continue with transfer to higher level of care and orders for a STAT H&H and to notify pulmonology. While on the phone with Dr Alo Rivers, patient started to cough more, and coughed up a large bright red clot, and continuing to spit blood from mouth. VSS, patient states no dizziness at this time. Patient states she does feel short of breath and breathing at 25 breaths per minute with O2 100% on 2LNC. Will continue to monitor closely, currently preparing for transfer.

## 2018-02-25 NOTE — PROGRESS NOTES
1915- Bedside report received from [facility] assumed care of patient. 2300- pt had BM, not enough for sample to lab, loose formed, medium sized.     0000- wound care consult placed, excoriation to Labia Majora, and small open area to L gluteal fold

## 2018-02-25 NOTE — PROGRESS NOTES
Pulmonary     Pt has developed massive hemoptysis and has been transferred to the ICU. She needs blood, a diagnostic bronchoscopy to localize site of bleeding and possible bronchial artery embolization. Pt unable to give consent and no family available to provide consent. I believe these interventions need to be done emergently.

## 2018-02-26 NOTE — PROGRESS NOTES
1900: Bedside and verbal report received from Maryjo Rodriguez, [facility].     2000: Assessment completed. Patient alert to self, place but confused. Lungs diminished bilaterally on 4L NC. NSR/ST. No complaints of pain. 2108: 2nd unit of PRBC started. 0000: Reassessment completed. 0025: 2 unit of PRBC completed. 0400: Reassessment completed. 0700: Bedside and verbal report given to Maryjo Rodriguez RN. Shift summary: Patient has had no signs of active bleeding/vomitting.

## 2018-02-26 NOTE — PROGRESS NOTES
Nutrition Assessment:    RECOMMENDATIONS:   Resume diet as medically able per GI     ASSESSMENT:   Chart reviewed, case discussed during CCU rounds. Pt NPO today for possible GI bleed, n/v this morning. GI saw her today. Pt has been confused. Labs reviewed. Her appetite appears to have been poor prior to being made NPO the past few days. Dietitians Intervention(s)/Plan(s): Resume diet as able per GI   SUBJECTIVE/OBJECTIVE:   Pt confused   Diet Order: NPO  % Eaten:  Patient Vitals for the past 72 hrs:   % Diet Eaten   02/24/18 1554 20 %   02/24/18 0753 10 %       Pertinent Medications:zithromax, cefepime, bryson Q, flagyl, protonix, vancomycin; [email or web address]); PRN Meds: zofran. Chemistries:  Lab Results   Component Value Date/Time    Sodium 147 (H) 02/26/2018 02:42 AM    Potassium 3.5 02/26/2018 02:42 AM    Chloride 119 (H) 02/26/2018 02:42 AM    CO2 16 (L) 02/26/2018 02:42 AM    Anion gap 12 02/26/2018 02:42 AM    Glucose 118 (H) 02/26/2018 02:42 AM    BUN 36 (H) 02/26/2018 02:42 AM    Creatinine 1.17 (H) 02/26/2018 02:42 AM    BUN/Creatinine ratio 31 (H) 02/26/2018 02:42 AM    GFR est AA 55 (L) 02/26/2018 02:42 AM    GFR est non-AA 45 (L) 02/26/2018 02:42 AM    Calcium 7.3 (L) 02/26/2018 02:42 AM    Albumin 1.2 (L) 02/26/2018 02:42 AM      Anthropometrics: Height: 5' 2\" (157.5 cm) Weight: 44.4 kg (97 lb 12.8 oz)   [x]bed scale (2/21)   []stated   []unknown     IBW (%IBW):   ( ) UBW (%UBW):   (  %)    BMI: Body mass index is 17.89 kg/(m^2). This BMI is indicative of:  [x]Underweight   []Normal   []Overweight   [] Obesity   [] Extreme Obesity (BMI>40)  Estimated Nutrition Needs (Based on): 1325 Kcals/day (BMR: 900 x 1.2 + 250) , 60 g (1.3 g/kg) Protein  Carbohydrate:  At Least 130 g/day  Fluids: 1400 mL/day    Last BM: 2/26   []Active     []Hyperactive  [x]Hypoactive       [] Absent   BS  Skin:    [x] Intact   [] Incision  [] Breakdown   [] DTI   [x] Tears/Excoriation/Abrasion  []Edema [] Other: Wt Readings from Last 30 Encounters:   02/21/18 44.4 kg (97 lb 12.8 oz)   08/02/17 47.6 kg (105 lb)   05/10/17 47.4 kg (104 lb 8 oz)   09/15/15 47.7 kg (105 lb 2.6 oz)   07/31/15 47.6 kg (105 lb)   12/15/14 54.7 kg (120 lb 11.2 oz)   06/17/14 51.7 kg (114 lb)   06/06/14 50.8 kg (112 lb)   02/04/14 51.7 kg (114 lb)   01/06/14 52.3 kg (115 lb 6.4 oz)   06/12/13 53.4 kg (117 lb 11.2 oz)   05/18/13 54.9 kg (121 lb)   05/14/13 54.1 kg (119 lb 4.8 oz)   03/01/13 57.6 kg (126 lb 14.4 oz)   02/19/13 58.6 kg (129 lb 3 oz)   09/28/12 56.4 kg (124 lb 6 oz)   08/20/12 54.7 kg (120 lb 8 oz)   03/26/12 52.8 kg (116 lb 4.8 oz)   10/30/11 53 kg (116 lb 13.5 oz)   10/10/11 52 kg (114 lb 10.2 oz)   08/22/11 52.8 kg (116 lb 6.5 oz)   07/04/11 55.5 kg (122 lb 5.7 oz)   07/02/11 96.6 kg (213 lb)   06/16/11 49 kg (108 lb)   06/11/11 46.3 kg (102 lb)   06/07/11 59 kg (130 lb 1.1 oz)   05/07/11 49.4 kg (109 lb)      NUTRITION DIAGNOSES:   Problem:  Underweight      Etiology: related to unclear etiology but likely unable to care for self     Signs/Symptoms: as evidenced by BMI of 17.9    Previous dx re: underweight continues. NUTRITION INTERVENTIONS:  Meals/Snacks: Other (Resume diet as medically able per GI )   Supplements: Commercial supplement              GOAL:   Pt will be resumed on PO diet in 2-3 days. NUTRITION MONITORING AND EVALUATION   Previous Goal: consume >25% of meals and ONS in 2-4 days   Previous Goal Met: N/A (Pt currently NPO for GI bleed)   Previous Recommendations Implemented: Yes   Cultural, Jewish, or Ethnic Dietary Needs: None   LEARNING NEEDS (Diet, Food/Nutrient-Drug Interaction):    [x] None Identified   [] Identified and Education Provided/Documented   [] Identified and Pt declined/was not appropriate      [x] Interdisciplinary Care Plan Reviewed/Documented    [x] Participated in Discharge Planning:  To be determined    [x] Interdisciplinary Rounds     NUTRITION RISK:    [x] High [] Moderate           []  Low  []  Minimal/Uncompromised      Kenisha Mercer, [address] [place] [address], [address] [place] [address] Dr  Pager [phone]  Weekend Pager [phone]

## 2018-02-26 NOTE — CONSULTS
GI Consultation Note [facility])    NAME: Zoran Marks : 1945 MRN: [id number]   PCP: Nmia Crockett MD  Date/Time:  2018 12:27 PM  Subjective:   REASON FOR CONSULT:    ?John Araujo is a 74 y.o.  female who I was asked to see for above. Pt has been hospitalized since 2/15 with COPD exacerbation and a multilobar PNA. Yesterday () pt reportedly had a large amount of hemoptysis. Pt subsequently underwent a bronchoscopy w/ RUL bleeding but reportedly no clear source of bleeding. Pt had a BM this AM w/o melena/hematochezia and denies AP. She is alert and oriented but confused. Past Medical History:   Diagnosis Date    Acute MI     Benzodiazepine abuse     CAD (coronary artery disease)     COPD     Diabetes ([address]     Emphysema     Essential hypertension     Gastrointestinal disorder     ulcer    Hyperkalemia     Hyperlipemia     Hypertension       Past Surgical History:   Procedure Laterality Date    CORONARY STENT SINGLE W/PTCA      HX CORONARY STENT PLACEMENT      HX HEART CATHETERIZATION       Social History   Substance Use Topics    Smoking status: Current Every Day Smoker     Packs/day: 0.50     Years: 0.00     Types: Cigarettes    Smokeless tobacco: Never Used    Alcohol use No      No family history on file. Allergies   Allergen Reactions    Iodinated Contrast- Oral And Iv Dye Anaphylaxis    Penicillin G Hives    Ciprofloxacin Unable to Obtain    Lyrica [Pregabalin] Other (comments)     lossing balance      Home Medications:  Prior to Admission Medications   Prescriptions Last Dose Informant Patient Reported? Taking? ASPIRIN/ACETAMINOPHEN/CAFFEINE (EXCEDRIN EXTRA STRENGTH PO) 2018 at Unknown time Self Yes Yes   Sig: Take 1 Tab by mouth daily as needed for Pain. clonazepam (KLONOPIN) 0.5 mg tablet 2018 at Unknown time Self Yes No   Sig: Take 0.5 mg by mouth four (4) times daily.    oxyCODONE IR (ROXICODONE) 10 mg tab immediate release tablet 2/12/2018 at Unknown time Self Yes Yes   Sig: Take 10 mg by mouth every four (4) hours as needed for Pain.   pravastatin (PRAVACHOL) 80 mg tablet 2/14/2018 at Unknown time Self No Yes   Sig: TAKE 1 TABLET BY MOUTH ONCE DAILY      Facility-Administered Medications: None     Hospital medications:  Current Facility-Administered Medications   Medication Dose Route Frequency    0.45% sodium chloride infusion  50 mL/hr IntraVENous CONTINUOUS    albuterol-ipratropium (DUO-NEB) 2.5 MG-0.5 MG/3 ML  3 mL Nebulization Q6H RT    azithromycin (ZITHROMAX) 500 mg in 0.9% sodium chloride 250 mL IVPB  500 mg IntraVENous Q24H    clotrimazole (MYCELEX) 1 % cream 1 Applicator  1 Applicator Vaginal QHS    0.9% sodium chloride infusion 250 mL  250 mL IntraVENous PRN    mupirocin (BACTROBAN) 2 % ointment   Both Nostrils BID    naloxone (NARCAN) injection 0.4 mg  0.4 mg IntraVENous Multiple    flumazenil (ROMAZICON) 0.1 mg/mL injection 0.2 mg  0.2 mg IntraVENous Multiple    midazolam (VERSED) injection 0.25-5 mg  0.25-5 mg IntraVENous Multiple    pantoprazole (PROTONIX) 40 mg in sodium chloride 10 mL injection  40 mg IntraVENous Q12H    fluticasone-vilanterol (BREO ELLIPTA) 100mcg-25mcg/puff  1 Puff Inhalation DAILY    lactobac ac& pc-s.therm-b.anim (FLORENTINO Q/RISAQUAD)  1 Cap Oral DAILY    metroNIDAZOLE (FLAGYL) IVPB premix 500 mg  500 mg IntraVENous Q8H    vancomycin (VANCOCIN) 750 mg in 0.9% sodium chloride 250 mL IVPB  750 mg IntraVENous Q18H    cefepime (MAXIPIME) 2 g in 0.9 %  mL IVPB  2 g IntraVENous Q12H    chlorhexidine (PERIDEX) 0.12 % mouthwash 15 mL  15 mL Oral Q12H    nitroglycerin (NITROSTAT) tablet 0.4 mg  0.4 mg SubLINGual PRN    aluminum-magnesium hydroxide (MAALOX) oral suspension 15 mL  15 mL Oral DAILY PRN    haloperidol lactate (HALDOL) injection 2 mg  2 mg IntraVENous Q6H PRN    nicotine (NICODERM CQ) 14 mg/24 hr patch 1 Patch  1 Patch TransDERmal DAILY    HYDROcodone-acetaminophen (NORCO) 5-325 mg per tablet 1 Tab  1 Tab Oral Q4H PRN    pravastatin (PRAVACHOL) tablet 80 mg  80 mg Oral DAILY    sodium chloride (NS) flush 5-10 mL  5-10 mL IntraVENous Q8H    sodium chloride (NS) flush 5-10 mL  5-10 mL IntraVENous PRN    acetaminophen (TYLENOL) tablet 650 mg  650 mg Oral Q6H PRN    ondansetron (ZOFRAN) injection 4 mg  4 mg IntraVENous Q6H PRN    docusate sodium (COLACE) capsule 100 mg  100 mg Oral DAILY PRN    guaiFENesin ER (MUCINEX) tablet 600 mg  600 mg Oral Q12H    guaiFENesin (ROBITUSSIN) 100 mg/5 mL oral liquid 100 mg  100 mg Oral TID PRN     REVIEW OF SYSTEMS:     [x]     Unable to obtain  ROS due to  [x]    confused  []    sedated   []    intubated   []    Total of 11 systems reviewed as follows:  Const:  negative fever, negative chills, negative weight loss  Eyes:   negative diplopia or visual changes, negative eye pain  ENT:   negative coryza, negative sore throat  Resp:   negative cough, hemoptysis, dyspnea  Cards:  negative for chest pain, palpitations, lower extremity edema  :  negative for frequency, dysuria and hematuria  Skin:   negative for rash and pruritus  Heme:  negative for easy bruising and gum/nose bleeding  MS:  negative for myalgias, arthralgias, back pain and muscle weakness  Neurolo:  negative for headaches, dizziness, vertigo, memory problems   Psych:  negative for feelings of anxiety, depression     Pertinent Positives include :    Objective:   VITALS:    Visit Vitals    /63    Pulse 95    Temp 98.1 °F (36.7 °C)    Resp 25    Ht 5' 2\" (1.575 m)    Wt 44.4 kg (97 lb 12.8 oz)    SpO2 98%    BMI 17.89 kg/m2     Temp (24hrs), Av.5 °F (36.9 °C), Min:97.1 °F (36.2 °C), Max:99.3 °F (37.4 °C)    PHYSICAL EXAM:   General:    Alert and oriented x2 but confused, cooperative, no distress, appears stated age. Head:   Normocephalic, without obvious abnormality, atraumatic. Eyes:   Conjunctivae clear, anicteric sclerae.   Pupils are equal  Nose:  Nares normal. No drainage or sinus tenderness. Throat:    Lips, mucosa, and tongue normal.  No Thrush  Neck:  Supple, symmetrical,  no adenopathy, thyroid: non tender  Back:    Symmetric,  No CVA tenderness. Lungs:   CTA bilaterally. No wheezing/rhonchi/rales. Chest wall:  No tenderness or deformity. No Accessory muscle use. Heart:   Regular rate and rhythm,  no murmur, rub or gallop. Abdomen:   Soft, non-tender. Not distended. Bowel sounds normal. No masses  Extremities: Atraumatic, No cyanosis. No edema. No clubbing  Skin:     Texture, turgor normal. No rashes/lesions/jaundice  Lymph: Cervical, supraclavicular normal.  Psych:  Good insight. Not depressed. Not anxious or agitated. Neurologic: EOMs intact. No facial asymmetry. No aphasia or slurred speech. Normal  Strength. LAB DATA REVIEWED:    Recent Results (from the past 50 hour(s))   CULTURE, MRSA    Collection Time: 02/24/18 12:39 PM   Result Value Ref Range    Special Requests: NO SPECIAL REQUESTS      Culture result: MRSA NOT PRESENT      Culture result:            Screening of patient nares for MRSA is for surveillance purposes and, if positive, to facilitate isolation considerations in high risk settings. It is not intended for automatic decolonization interventions per se as regimens are not sufficiently effective to warrant routine use.    CBC WITH AUTOMATED DIFF    Collection Time: 02/25/18  1:14 AM   Result Value Ref Range    WBC 44.4 (H) 3.6 - 11.0 K/uL    RBC 3.94 3.80 - 5.20 M/uL    HGB 9.2 (L) 11.5 - 16.0 g/dL    HCT 29.7 (L) 35.0 - 47.0 %    MCV 75.4 (L) 80.0 - 99.0 FL    MCH 23.4 (L) 26.0 - 34.0 PG    MCHC 31.0 30.0 - 36.5 g/dL    RDW 21.0 (H) 11.5 - 14.5 %    PLATELET 876 191 - 072 K/uL    MPV 10.5 8.9 - 12.9 FL    NRBC 0.0 0  WBC    ABSOLUTE NRBC 0.00 0.00 - 0.01 K/uL    NEUTROPHILS 94 (H) 32 - 75 %    BAND NEUTROPHILS 1 %    LYMPHOCYTES 1 (L) 12 - 49 %    MONOCYTES 4 (L) 5 - 13 %    EOSINOPHILS 0 0 - 7 %    BASOPHILS 0 0 - 1 % IMMATURE GRANULOCYTES 0 0.0 - 0.5 %    ABS. NEUTROPHILS 42.2 (H) 1.8 - 8.0 K/UL    ABS. LYMPHOCYTES 0.4 (L) 0.8 - 3.5 K/UL    ABS. MONOCYTES 1.8 (H) 0.0 - 1.0 K/UL    ABS. EOSINOPHILS 0.0 0.0 - 0.4 K/UL    ABS. BASOPHILS 0.0 0.0 - 0.1 K/UL    ABS. IMM. GRANS. 0.0 0.00 - 0.04 K/UL    DF AUTOMATED      RBC COMMENTS ANISOCYTOSIS  2+        RBC COMMENTS CIERRA CELLS  PRESENT       MAGNESIUM    Collection Time: 02/25/18  1:14 AM   Result Value Ref Range    Magnesium 1.9 1.6 - 2.4 mg/dL   METABOLIC PANEL, COMPREHENSIVE    Collection Time: 02/25/18  1:14 AM   Result Value Ref Range    Sodium 141 136 - 145 mmol/L    Potassium 3.2 (L) 3.5 - 5.1 mmol/L    Chloride 110 (H) 97 - 108 mmol/L    CO2 21 21 - 32 mmol/L    Anion gap 10 5 - 15 mmol/L    Glucose 117 (H) 65 - 100 mg/dL    BUN 27 (H) 6 - 20 MG/DL    Creatinine 1.07 (H) 0.55 - 1.02 MG/DL    BUN/Creatinine ratio 25 (H) 12 - 20      GFR est AA >60 >60 ml/min/1.73m2    GFR est non-AA 50 (L) >60 ml/min/1.73m2    Calcium 7.8 (L) 8.5 - 10.1 MG/DL    Bilirubin, total 0.3 0.2 - 1.0 MG/DL    ALT (SGPT) 17 12 - 78 U/L    AST (SGOT) 20 15 - 37 U/L    Alk.  phosphatase 126 (H) 45 - 117 U/L    Protein, total 6.2 (L) 6.4 - 8.2 g/dL    Albumin 1.5 (L) 3.5 - 5.0 g/dL    Globulin 4.7 (H) 2.0 - 4.0 g/dL    A-G Ratio 0.3 (L) 1.1 - 2.2     HGB & HCT    Collection Time: 02/25/18  7:11 AM   Result Value Ref Range    HGB 8.3 (L) 11.5 - 16.0 g/dL    HCT 26.7 (L) 35.0 - 47.0 %   TROPONIN I    Collection Time: 02/25/18  7:11 AM   Result Value Ref Range    Troponin-I, Qt. 0.04 <0.05 ng/mL   CK    Collection Time: 02/25/18  7:11 AM   Result Value Ref Range    CK 36 26 - 192 U/L   LIPASE    Collection Time: 02/25/18  7:11 AM   Result Value Ref Range    Lipase 265 73 - 393 U/L   EKG, 12 LEAD, INITIAL    Collection Time: 02/25/18  7:18 AM   Result Value Ref Range    Ventricular Rate 104 BPM    Atrial Rate 104 BPM    P-R Interval 118 ms    QRS Duration 66 ms    Q-T Interval 352 ms    QTC Calculation (Bezet) 462 ms    Calculated P Axis 98 degrees    Calculated R Axis -61 degrees    Calculated T Axis 85 degrees    Diagnosis       Sinus tachycardia  Left axis deviation  Inferior infarct (cited on or before 15-FEB-2018)  When compared with ECG of 20-FEB-2018 12:32,  No significant change was found  Confirmed by Faina Ayala ([id number]) on 2/25/2018 3:17:14 PM     TYPE & SCREEN    Collection Time: 02/25/18  7:22 AM   Result Value Ref Range    Crossmatch Expiration 02/28/2018     ABO/Rh(D) A POSITIVE     Antibody screen NEG     Unit number [id number]     Blood component type  LR     Unit division 00     Status of unit TRANSFUSED     Crossmatch result Compatible     Unit number [id number]     Blood component type  LR     Unit division 00     Status of unit TRANSFUSED     Crossmatch result Compatible    LACTIC ACID    Collection Time: 02/25/18  7:25 AM   Result Value Ref Range    Lactic acid 2.9 (HH) 0.4 - 2.0 MMOL/L   OCCULT BLOOD, STOOL    Collection Time: 02/25/18 10:00 AM   Result Value Ref Range    Occult blood, stool POSITIVE (A) NEG     HGB & HCT    Collection Time: 02/25/18  1:50 PM   Result Value Ref Range    HGB 6.8 (L) 11.5 - 16.0 g/dL    HCT 21.7 (L) 35.0 - 47.0 %   LACTIC ACID    Collection Time: 02/25/18  1:50 PM   Result Value Ref Range    Lactic acid 3.0 (HH) 0.4 - 2.0 MMOL/L   BLOOD GAS, ARTERIAL    Collection Time: 02/25/18  2:15 PM   Result Value Ref Range    pH 7.49 (H) 7.35 - 7.45      PCO2 19 (L) 35.0 - 45.0 mmHg    PO2 61 (L) 80 - 100 mmHg    O2 SAT 94 92 - 97 %    BICARBONATE 14 (L) 22 - 26 mmol/L    BASE DEFICIT 6.2 mmol/L    O2 METHOD NASAL O2      O2 FLOW RATE 3.00 L/min    Sample source ARTERIAL      SITE LEFT BRACHIAL      JEMAL'S TEST N/A     GLUCOSE, POC    Collection Time: 02/25/18  4:45 PM   Result Value Ref Range    Glucose (POC) 107 (H) 65 - 100 mg/dL    Performed by Merline Denier, RESPIRATORY/SPUTUM/BRONCH [facility] STAIN    Collection Time: 02/25/18  6:30 PM Result Value Ref Range    Special Requests: NO SPECIAL REQUESTS      GRAM STAIN 2+ WBCS SEEN      GRAM STAIN RARE EPITHELIAL CELLS SEEN      GRAM STAIN FEW BUDDING YEAST      Culture result: PENDING    CELL COUNT, BODY FLUID    Collection Time: 02/25/18  6:30 PM   Result Value Ref Range    BODY FLUID TYPE BRONCHIAL WASHING      FLUID COLOR RED      FLUID APPEARANCE BLOODY      FLUID RBC CT. >100 (H) 0 /cu mm    FLUID NUCLEATED CELLS 1190 /cu mm    FLD NEUTROPHILS 96 (A) NRRE %    FLD MONO/MACROPHAGES 4 (A) NRRE %    PATHOLOGIST REVIEW       Budding yeast and hyphae present. Recommend correlation with concurrent microbiology studies. Smear reviewed by Dr. Aziza Gaston   GLUCOSE, POC    Collection Time: 02/25/18 10:14 PM   Result Value Ref Range    Glucose (POC) 118 (H) 65 - 100 mg/dL    Performed by [place]    LACTIC ACID    Collection Time: 02/26/18  1:35 AM   Result Value Ref Range    Lactic acid 1.6 0.4 - 2.0 MMOL/L   CBC WITH AUTOMATED DIFF    Collection Time: 02/26/18  1:35 AM   Result Value Ref Range    WBC 33.5 (H) 3.6 - 11.0 K/uL    RBC 3.15 (L) 3.80 - 5.20 M/uL    HGB 8.3 (L) 11.5 - 16.0 g/dL    HCT 25.7 (L) 35.0 - 47.0 %    MCV 81.6 80.0 - 99.0 FL    MCH 26.3 26.0 - 34.0 PG    MCHC 32.3 30.0 - 36.5 g/dL    RDW 19.6 (H) 11.5 - 14.5 %    PLATELET 298 844 - 272 K/uL    MPV 11.3 8.9 - 12.9 FL    NRBC 0.0 0  WBC    ABSOLUTE NRBC 0.00 0.00 - 0.01 K/uL    NEUTROPHILS 97 (H) 32 - 75 %    LYMPHOCYTES 1 (L) 12 - 49 %    MONOCYTES 2 (L) 5 - 13 %    EOSINOPHILS 0 0 - 7 %    BASOPHILS 0 0 - 1 %    IMMATURE GRANULOCYTES 0 0.0 - 0.5 %    ABS. NEUTROPHILS 32.5 (H) 1.8 - 8.0 K/UL    ABS. LYMPHOCYTES 0.3 (L) 0.8 - 3.5 K/UL    ABS. MONOCYTES 0.7 0.0 - 1.0 K/UL    ABS. EOSINOPHILS 0.0 0.0 - 0.4 K/UL    ABS. BASOPHILS 0.0 0.0 - 0.1 K/UL    ABS. IMM.  GRANS. 0.0 0.00 - 0.04 K/UL    DF MANUAL      RBC COMMENTS ANISOCYTOSIS  1+        RBC COMMENTS CIERRA CELLS  PRESENT        RBC COMMENTS SPHEROCYTES  PRESENT        RBC COMMENTS MICROCYTOSIS  1+       PROTHROMBIN TIME + INR    Collection Time: 02/26/18  1:35 AM   Result Value Ref Range    INR 1.8 (H) 0.9 - 1.1      Prothrombin time 18.6 (H) 9.0 - 11.1 sec   PTT    Collection Time: 02/26/18  1:35 AM   Result Value Ref Range    aPTT 41.0 (H) 22.1 - 32.0 sec    aPTT, therapeutic range     58.0 - 67.7 SECS   METABOLIC PANEL, COMPREHENSIVE    Collection Time: 02/26/18  2:42 AM   Result Value Ref Range    Sodium 147 (H) 136 - 145 mmol/L    Potassium 3.5 3.5 - 5.1 mmol/L    Chloride 119 (H) 97 - 108 mmol/L    CO2 16 (L) 21 - 32 mmol/L    Anion gap 12 5 - 15 mmol/L    Glucose 118 (H) 65 - 100 mg/dL    BUN 36 (H) 6 - 20 MG/DL    Creatinine 1.17 (H) 0.55 - 1.02 MG/DL    BUN/Creatinine ratio 31 (H) 12 - 20      GFR est AA 55 (L) >60 ml/min/1.73m2    GFR est non-AA 45 (L) >60 ml/min/1.73m2    Calcium 7.3 (L) 8.5 - 10.1 MG/DL    Bilirubin, total 0.3 0.2 - 1.0 MG/DL    ALT (SGPT) 12 12 - 78 U/L    AST (SGOT) 20 15 - 37 U/L    Alk. phosphatase 91 45 - 117 U/L    Protein, total 5.0 (L) 6.4 - 8.2 g/dL    Albumin 1.2 (L) 3.5 - 5.0 g/dL    Globulin 3.8 2.0 - 4.0 g/dL    A-G Ratio 0.3 (L) 1.1 - 2.2     HGB & HCT    Collection Time: 02/26/18  7:49 AM   Result Value Ref Range    HGB 6.8 (L) 11.5 - 16.0 g/dL    HCT 20.5 (L) 35.0 - 47.0 %   HGB & HCT    Collection Time: 02/26/18  8:37 AM   Result Value Ref Range    HGB 10.1 (L) 11.5 - 16.0 g/dL    HCT 30.6 (L) 35.0 - 47.0 %     IMAGING RESULTS:   []      I have personally reviewed the actual   []    CXR  []    CT  []     US    Assessment/Plan:      Active Problems:    Acute exacerbation of chronic obstructive pulmonary disease (COPD) ([facility] [address] (2/15/2018)      Benzodiazepine abuse (2/17/2018)      Acute MI ()    1. ?Hematemesis  2. Hemoptysis s/p bronch with bleeding in RUL but no clear source of bleeding  3. No melena  4. COPD exacerbation and multilobar PNA  5. Pt is confused and not consentable and no family for consent  6. Anemia that responded robustly to blood transfusion  ___________________________________________________  RECOMMENDATIONS:    - ideally would perform EGD for further evaluation to r/o a GI source though it is unclear if pt did have an episode GI bleeding  - at this time I can not obtain consent from pt nor does she have family we can obtain consent from (confused) and an EGD is not urgent/emergent as she has no e/o active bleeding and her Hgb responded well to blood transfusion  - recommend IV PPI BID and monitor for GI bleeding; if pt has clear e/o overt GI bleeding would perform EGD on an urgent basis as deemed indicated    Discussed Code Status:    []    Full Code      []    DNR    ___________________________________________________  Care Plan discussed with:    [x]    Patient   []    Family   [x]    Nursing   []    Attending     ___________________________________________________  GI: Mike Arriaza MD

## 2018-02-26 NOTE — CONSULTS
Pulmonary / Critical Care Consultation    Assessment / Plan:      Patchy multilobar pneumonia - in patient with known aspiration risk and underlying copd - feels better clinically but WBC elevated. Some suggestion of hematogenous spread and cavitation (may be secondary to underlying severe appearing emphysema rather than necrotizing process but agree with addition of vancomycin. Blood cultures have been negative    Acute Hypoxic Respiratory Failure, ON NC oxygen. Possible GI or Pulmonary Source of bleeding. Noted to have some RUL bleeding upon bronch yesterday. RUL hemoptysis? Leukocytosis    COPD / emphysema      Tobacco abuse    CAD    DM    DNR    Rec:   -Oxygen to keep pox over 90%. --continue abx and follow clinically (on maxipime, flagyl and vanc) pt is pcn allergic  --aspiration precautions with SLP following  --follow up blood cultures are pending  --repeat sputum culture  --follow for future radiographic resolution  -Appears stable for transfer out of ICU.   -Will follow along with you. History / Subjective, last 24 hrs:     Pt is very thirsty this am. Has not had any loose stools. Reason:  pneumonia  Requesting Provider:  Dr Arin Mcdowell is a 74 y.o.  female who  has a past medical history of Acute MI; Benzodiazepine abuse; CAD (coronary artery disease); COPD; Diabetes ([facility] [address] Emphysema; Essential hypertension; Gastrointestinal disorder; Hyperkalemia; Hyperlipemia; and Hypertension. admitted 2/15/2018 with pneumonia. Pt found to have aspiration risk and has been on abx. She clinically says she feels much better and is afebrile. However, her WBC is rising and her CXR has shown increase in primarily upper lobe asdz. Denies change in sputum. No fevers or chills. No chest pain.   No hemoptysis    Allergies   Allergen Reactions    Iodinated Contrast- Oral And Iv Dye Anaphylaxis    Penicillin G Hives    Ciprofloxacin Unable to Obtain    Lyrica [Pregabalin] Other (comments)     lossing balance     Past Medical History:   Diagnosis Date    Acute MI     Benzodiazepine abuse     CAD (coronary artery disease)     COPD     Diabetes ([facility] [address]     Emphysema     Essential hypertension     Gastrointestinal disorder     ulcer    Hyperkalemia     Hyperlipemia     Hypertension       Past Surgical History:   Procedure Laterality Date    CORONARY STENT SINGLE W/PTCA      HX CORONARY STENT PLACEMENT      HX HEART CATHETERIZATION        Prior to Admission medications    Medication Sig Start Date End Date Taking? Authorizing Provider   metoprolol tartrate (LOPRESSOR) 25 mg tablet Take 1 Tab by mouth two (2) times a day. 2/17/18  Yes New Delacruz MD   aspirin 81 mg chewable tablet Take 1 Tab by mouth daily. 2/18/18  Yes New Delacruz MD   predniSONE (DELTASONE) 10 mg tablet Take 4 tabs daily for 3 days, then take 3 tabs daily for 3 days, then take 2 tabs daily for 3 days, then take 1 tab daily for 3 days, then stop 2/17/18  Yes New Delacruz MD   albuterol (PROVENTIL HFA, VENTOLIN HFA, PROAIR HFA) 90 mcg/actuation inhaler Take 2 Puffs by inhalation every four (4) hours as needed for Shortness of Breath. 2/17/18  Yes New Delacruz MD   oxyCODONE IR (ROXICODONE) 10 mg tab immediate release tablet Take 10 mg by mouth every four (4) hours as needed for Pain. Yes Historical Provider   pravastatin (PRAVACHOL) 80 mg tablet TAKE 1 TABLET BY MOUTH ONCE DAILY 7/6/15  Yes RAMON Hicks   ASPIRIN/ACETAMINOPHEN/CAFFEINE (EXCEDRIN EXTRA STRENGTH PO) Take 1 Tab by mouth daily as needed for Pain. Yes Historical Provider   clonazepam (KLONOPIN) 0.5 mg tablet Take 0.5 mg by mouth four (4) times daily. 5/7/11   Sedrick Hayes MD      No family history on file.   Social History   Substance Use Topics    Smoking status: Current Every Day Smoker     Packs/day: 0.50     Years: 0.00     Types: Cigarettes    Smokeless tobacco: Never Used    Alcohol use No      ROS:  A comprehensive review of systems was negative except for that written in the HPI. Objective:  Patient Vitals for the past 4 hrs:   BP Temp Pulse Resp SpO2   18 0700 109/52 - 86 18 -   18 0600 124/64 - 96 23 94 %   18 0513 124/72 - (!) 101 18 (!) 80 %   18 0400 127/64 97.1 °F (36.2 °C) 91 21 100 %     Temp (24hrs), Av.5 °F (36.9 °C), Min:97.1 °F (36.2 °C), Max:99.3 °F (37.4 °C)    CVP:          Intake/Output Summary (Last 24 hours) at 18 0714  Last data filed at 18 0600   Gross per 24 hour   Intake          3483.33 ml   Output              500 ml   Net          2983.33 ml     Blood Sugar:  Glucose   Date Value Ref Range Status   2018 118 (H) 65 - 100 mg/dL Final   2018 117 (H) 65 - 100 mg/dL Final   2018 89 65 - 100 mg/dL Final   2018 86 65 - 100 mg/dL Final   2018 135 (H) 65 - 100 mg/dL Final     Exam:  Thin wf in NAD  Alert  MM dry  Anicteric  Trachea midline  No accessory use  Kyphosis  Distant bs bilaterally  RRR  Soft / NT / + BS  Warm and dry  Trace edema  No cyanosis or clubbing    Lab data was reviewed. Radiology images were independently viewed and available reports were reviewed.     CXR:  Flat diaphragms, bilateral asdz with some suggestion of cavitation    CT - emphysema with patchy bilateral asdz    Lab:  Recent Labs      18   0242  18   0135  18   1350  18   0725  18   0711  18   0114  18   0431   WBC   --   33.5*   --    --    --   44.4*  42.9*   HGB   --   8.3*  6.8*   --   8.3*  9.2*  9.6*   PLT   --   205   --    --    --   306  340   NA  147*   --    --    --    --   141  140   K  3.5   --    --    --    --   3.2*  3.6   CL  119*   --    --    --    --   110*  109*   CO2  16*   --    --    --    --   21  24   BUN  36*   --    --    --    --   27*  20   CREA  1.17*   --    --    --    --   1.07*  0.92   GLU  118*   --    --    --    --   117*  89   CA  7.3*   --    --    --    --   7.8*  8.0*   MG   --    --    -- --    --   1.9  1.9   INR   --   1.8*   --    --    --    --    --    TROIQ   --    --    --    --   0.04   --    --    CPK   --    --    --    --   36   --    --    TBILI  0.3   --    --    --    --   0.3   --    SGOT  20   --    --    --    --   20   --    LAC   --   1.6  3.0*  2.9*   --    --    --      All Micro Results     Procedure Component Value Units Date/Time    CULTURE, SPUTUM/BRONCH/OTH [[id number]] Collected:  02/25/18 1830    Order Status:  Completed Specimen:  Sputum from Bronchial Washing Updated:  02/25/18 2344     Special Requests: NO SPECIAL REQUESTS        GRAM STAIN 2+ WBCS SEEN                 RARE EPITHELIAL CELLS SEEN      FEW BUDDING YEAST        Culture result: PENDING    CULTURE, FUNGUS [[id number]] Collected:  02/25/18 1830    Order Status:  Completed Specimen:  Bronchial Washing Updated:  02/25/18 2233    CULTURE, MRSA [[id number]] Collected:  02/24/18 1239    Order Status:  Completed Specimen:  Nares Updated:  02/25/18 2011     Special Requests: NO SPECIAL REQUESTS        Culture result: MRSA NOT PRESENT                     Screening of patient nares for MRSA is for surveillance purposes and, if positive, to facilitate isolation considerations in high risk settings. It is not intended for automatic decolonization interventions per se as regimens are not sufficiently effective to warrant routine use.     AFB CULTURE + SMEAR W/RFLX ID FROM CULTURE [[id number]] Collected:  02/25/18 1830    Order Status:  Completed Updated:  02/25/18 1914    CULTURE, STOOL [[id number]] Collected:  02/23/18 1156    Order Status:  Completed Specimen:  Stool Updated:  02/25/18 1054     Special Requests: NO SPECIAL REQUESTS        Campylobacter antigen NEGATIVE        Shiga toxin-producing E. coli Ag NO GROWTH        Culture result:         NO ROUTINE ENTERIC PATHOGENS ISOLATED INCLUDING SALMONELLA, SHIGELLA, YERSINIA, VIBRIO OR E. COLI 0157:H7      NO COLIFORMS ISOLATED                 HEAVY MIXED GRAM POSITIVE FLORENTINO ISOLATED , ONLY    CULTURE, BLOOD, PAIRED [[id number]] Collected:  02/24/18 0808    Order Status:  Completed Specimen:  Blood Updated:  02/25/18 1041     Special Requests: NO SPECIAL REQUESTS        Culture result: NO GROWTH 1 DAY       OVA & PARASITES, STOOL [[id number]] Collected:  02/25/18 1000    Order Status:  Completed Specimen:  Stool from Stool Updated:  02/25/18 1008    CULTURE, RESPIRATORY/SPUTUM/BRONCH Volodymyr November STAIN [[id number]]     Order Status:  Sent Specimen:  Sputum from Sputum     OVA & PARASITES, STOOL [[id number]]     Order Status:  Canceled Specimen:  Stool from Stool     C. DIFFICILE (DNA) [[id number]]     Order Status:  Canceled     CULTURE, RESPIRATORY/SPUTUM/BRONCH Volodymyr November STAIN [[id number]] Collected:  02/21/18 1500    Order Status:  Canceled Specimen:  Sputum from Sputum     INFLUENZA A & B AG (RAPID TEST) [[id number]]     Order Status:  Canceled Specimen:  Nasopharyngeal from Nasal washing     CULTURE, BLOOD, PAIRED [[id number]] Collected:  02/15/18 1132    Order Status:  Completed Specimen:  Blood Updated:  02/20/18 0803     Special Requests: NO SPECIAL REQUESTS        Culture result: NO GROWTH 5 DAYS       CULTURE, RESPIRATORY/SPUTUM/BRONCH Kam Aguillon [[id number]] Collected:  02/18/18 1415    Order Status:  Canceled Specimen:  Sputum from Sputum     CULTURE, MRSA [[id number]]     Order Status:  Canceled Specimen:  Toby Jonas MD

## 2018-02-26 NOTE — PROGRESS NOTES
Hospitalist Progress Note    NAME: Zoran Marks   :  1945   MRN:  [id number]       Interim Hospital Summary: 74 y.o. female whom presented on 2/15/2018 with      Assessment / Plan:  Hemoptysis: started yesterday 2 units PRBC given, s/p Bronchoscopy with BAL showing infection, F/U cultures CXR shows heavy burden from PNA in both upper lobes. Pulmonology help appreciated. Acute COPD exacerbation due to pneumonia  Sepsis not POA: c/w IVF, ABX. Leukocytosis, tachycardia, lactic acidosis: so far WBC trending down, monitor. Aspiration PNA: with hemoptysis to r/o necrotizing pneumonia, will c/w Cefepime/Flagyl/Vancomycin/Zithromax, bronchodilators. Hx of Tobacco use  ABD pain/N/V  Those had improved, but patient has diarrhea will check stool work up. Chest Pain (resolved)  Elevated troponin upon admission  Paroxysmal atrial fibrillation  - cardiology following; no further chest pain. Last stent placement in .   - continue with ASA and metoprolol  - remain in junctional rhythm with rate of 80 (CT interval 0.1)  - normal Echo. Stress test revealed no evidence of MI  Vaginal candidasis clotrimazole vaginal cream ordered  Anemia, ? GIB  - H/H 8.3/26.7 from 9.2/29.7 within 8 hours of time span. [address] H/H @1400. Will consider GI consult. Heme (+) stool, as per GI will need EGD once she is more stable. Acute encephalopathy still confused but much improved, 2ry to sepsis, monitor. HX Clonazepam abuse  - will consider pscy consult if need  - continue w/ aspirin, echo unremarkable  CAD, HTN, HLD continue asprin, BB, statin   Code Status: dnr  Surrogate Decision Maker: Alesia Lundborg ([facility] for the past one year)  DVT Prophylaxis: SCD  Recommended Disposition: rehab    Patient is awake and alert but may not have capacity for complex medical decisions, monitor. Subjective:     Chief Complaint / Reason for Physician Visit  \"I feel a little better today\". Discussed with RN events overnight.      Review of Systems:  Symptom Y/N Comments  Symptom Y/N Comments   Fever/Chills n   Chest Pain n    Poor Appetite    Edema     Cough y   Abdominal Pain     Sputum    Joint Pain     SOB/HANDLEY    Pruritis/Rash     Nausea/vomit y   Tolerating PT/OT     Diarrhea n   Tolerating Diet y    Constipation n   Other       Could NOT obtain due to:      Objective:     VITALS:   Last 24hrs VS reviewed since prior progress note.  Most recent are:  Patient Vitals for the past 24 hrs:   Temp Pulse Resp BP SpO2   02/26/18 1418 - 95 (!) 31 - -   02/26/18 1400 - 94 26 128/62 100 %   02/26/18 1347 - - - - 100 %   02/26/18 1300 - 99 24 139/68 -   02/26/18 1200 98.8 °F (37.1 °C) 94 19 124/59 -   02/26/18 1100 - 95 24 131/61 -   02/26/18 1000 - 95 25 126/63 98 %   02/26/18 0900 - (!) 103 25 147/75 98 %   02/26/18 0800 98.1 °F (36.7 °C) 96 24 124/68 97 %   02/26/18 0752 - - - - 94 %   02/26/18 0700 - 86 18 109/52 -   02/26/18 0600 - 96 23 124/64 94 %   02/26/18 0513 - (!) 101 18 124/72 (!) 80 %   02/26/18 0400 97.1 °F (36.2 °C) 91 21 127/64 100 %   02/26/18 0300 - 90 23 119/61 99 %   02/26/18 0200 - 97 22 116/59 100 %   02/26/18 0100 - 85 18 99/56 99 %   02/26/18 0000 98.1 °F (36.7 °C) 95 23 108/68 98 %   02/25/18 2330 - 100 21 97/59 98 %   02/25/18 2300 - 94 23 122/64 97 %   02/25/18 2230 - 90 18 92/55 100 %   02/25/18 2200 - 100 23 119/59 99 %   02/25/18 2145 - 96 19 110/58 99 %   02/25/18 2130 98.7 °F (37.1 °C) 99 29 109/56 100 %   02/25/18 2115 - 94 20 103/52 98 %   02/25/18 2100 98.1 °F (36.7 °C) 93 18 109/54 98 %   02/25/18 2000 98 °F (36.7 °C) 100 25 107/58 97 %   02/25/18 1935 - (!) 104 18 115/56 95 %   02/25/18 1932 - (!) 101 19 - 96 %   02/25/18 1919 - - - - 98 %   02/25/18 1900 98.6 °F (37 °C) 100 21 99/52 98 %   02/25/18 1845 - (!) 104 22 98/50 99 %   02/25/18 1815 - (!) 121 30 112/57 97 %   02/25/18 1800 98.6 °F (37 °C) (!) 108 20 99/54 100 %   02/25/18 1745 99 °F (37.2 °C) (!) 103 25 95/53 100 %   02/25/18 1730 99 °F (37.2 °C) (!) 104 26 94/56 100 %   02/25/18 1715 98.9 °F (37.2 °C) (!) 102 26 100/58 100 %   02/25/18 1700 99 °F (37.2 °C) (!) 102 24 102/54 100 %   02/25/18 1645 99.3 °F (37.4 °C) (!) 101 (!) 37 94/49 100 %   02/25/18 1630 98.9 °F (37.2 °C) (!) 103 23 102/50 100 %   02/25/18 1622 97.8 °F (36.6 °C) (!) 102 23 96/51 100 %   02/25/18 1600 - (!) 105 23 92/46 100 %   02/25/18 1537 - (!) 103 20 90/53 100 %   02/25/18 1509 98.8 °F (37.1 °C) (!) 111 28 97/54 99 %   02/25/18 1433 99 °F (37.2 °C) (!) 111 28 101/46 98 %       Intake/Output Summary (Last 24 hours) at 02/26/18 1432  Last data filed at 02/26/18 1418   Gross per 24 hour   Intake           4832.5 ml   Output              500 ml   Net           4332.5 ml        PHYSICAL EXAM:  General: Ill appearing. Alert, cooperative, no acute distress    EENT:  EOMI. Anicteric sclerae. MMM  Resp:  Clear in apex with decreased breath sounds at bases. no wheezing or rales. No accessory muscle use  CV:  Regular  rhythm,  No edema  GI:  Soft, Non distended, diffuse tenderness.  +Bowel sounds  Neurologic:  Alert and oriented X 3, normal speech,   Psych:   Fair insight. Not anxious nor agitated  Skin:  No rashes. No jaundice    Reviewed most current lab test results and cultures  YES  Reviewed most current radiology test results   YES  Review and summation of old records today    NO  Reviewed patient's current orders and MAR    YES  PMH/SH reviewed - no change compared to H&P  ________________________________________________________________________  Care Plan discussed with:    Comments   Patient y    Family      RN y    Care Manager     Consultant                       y Multidiciplinary team rounds were held today with , nursing, pharmacist and clinical coordinator. Patient's plan of care was discussed; medications were reviewed and discharge planning was addressed.      ________________________________________________________________________  Total NON critical care TIME:  30 Minutes    Total CRITICAL CARE TIME Spent:   Minutes non procedure based      Comments   >50% of visit spent in counseling and coordination of care     ________________________________________________________________________  Bishop Ibarra MD     Procedures: see electronic medical records for all procedures/Xrays and details which were not copied into this note but were reviewed prior to creation of Plan. LABS:  I reviewed today's most current labs and imaging studies. Pertinent labs include:  Recent Labs      02/26/18   0837  02/26/18   0749  02/26/18   0135 02/25/18   0114  02/24/18   0431   WBC   --    --   33.5*   --   44.4*  42.9*   HGB  10.1*  6.8*  8.3*   < >  9.2*  9.6*   HCT  30.6*  20.5*  25.7*   < >  29.7*  30.6*   PLT   --    --   205   --   306  340    < > = values in this interval not displayed.      Recent Labs      02/26/18   0242  02/26/18   0135  02/25/18   0114 02/24/18   0431   NA  147*   --   141  140   K  3.5   --   3.2*  3.6   CL  119*   --   110*  109*   CO2  16*   --   21  24   GLU  118*   --   117*  89   BUN  36*   --   27*  20   CREA  1.17*   --   1.07*  0.92   CA  7.3*   --   7.8*  8.0*   MG   --    --   1.9  1.9   ALB  1.2*   --   1.5*   --    TBILI  0.3   --   0.3   --    SGOT  20   --   20   --    ALT  12   --   17   --    INR   --   1.8*   --    --        Signed: )Bishop Ibarra MD

## 2018-02-26 NOTE — PROGRESS NOTES
CM sent pt handoff to receiving CM in CCU.      KAMALJIT Lane Supervise[employer] in Social Work, [address] [place] [address]  [phone]

## 2018-02-26 NOTE — INTERDISCIPLINARY ROUNDS
Interdisciplinary team rounds were held 2/26/18 with the following team members:Care Management, Diabetes Treatment Specialist, Nursing, Nutrition, Pharmacy, Physician, Respiratory Therapy and Clinical Coordinator. Plan of care discussed. Goal: See MD orders and progress notes for further  interventions and desired outcomes.

## 2018-02-26 NOTE — PROGRESS NOTES
Speech path  Spoke to nsg who reported the pt has been vomiting this morning and to hold therapy. We will follow up tomorrow.   Sandeep Gottlieb, SLP

## 2018-02-26 NOTE — PROGRESS NOTES
0700: Report received from DOUG Major.    0800: Pt. Is alert but not oriented  to time and has periods of confusion. Pulses palpable. Patient is on 4 L's NC, but refuses to wear it at times. Breath sounds diminished. Pt. Has not coughed up any blood since being in the CCU. Bowel sounds active. Skin intact but patients sacrum is very red, foam pad applied. 8798: Pt. Vomited yellow/green emesis. PRN Zofran given for nausea earlier before this episode. 0900: Pt. Is refusing to take all PO meds at this time. 1200: Pt. Is now tolerating sips of water. Per Dr. Ayla Calderon (GI), patient is allowed to have sips of water and we can check Patient's H&H every 12 hrs instead of 8 hrs.     1300: Pt. Has had 2 loose BM's today but they are not watery enough to send for C DIFF samples. They do not move when put in a specimen cup, they stay attached to the side of the container. 1400: Nurse has attempted to make sure patient turns every two hours but patient keeps adamantly refusing. Pt. Will now not turn at all. Pt. Is alert and oriented X3, but has brief periods of confusion and obscene behavior. 1500: Report given to Cecily Salas.  DOUG.

## 2018-02-26 NOTE — PROGRESS NOTES
Chart reviewed. RN reports that pt has been vomiting this AM and requesting therapy return later. Will continue to follow.     Lady Orozco, PT, DPT

## 2018-02-27 NOTE — WOUND CARE
Wound care nurse consult for \"erythema to sacrum\". Patient is a 73 y/o CF for acute on chronic COPD exacerbation. Currently in CCU, off bi-pap and now on NC. Patient is confused and has PMHX:  Past Medical History:   Diagnosis Date    Acute MI     Benzodiazepine abuse     CAD (coronary artery disease)     COPD     Diabetes ([facility] [address]     Emphysema     Essential hypertension     Gastrointestinal disorder     ulcer    Hyperkalemia     Hyperlipemia     Hypertension      Patient is also incontinent of urine with a [place] female external cathter in place. She has redness with satellite pustules to her perineum indicative of yeast. Sacrum shows no erythema at this time and skin intact. Recommend:    Perineum/buttocks: cleanse gently with soap and water, pat dry and apply Secura antifungal zinc cream to skin.     Joanna Clark RN, [employer]

## 2018-02-27 NOTE — INTERDISCIPLINARY ROUNDS
Interdisciplinary team rounds were held 2/27/18 with the following team members:Care Management, Diabetes Treatment Specialist, Nutrition, Pharmacy, Physical Therapy, Physician and Clinical Coordinator and the .    Plan of care discussed. Goal: See MD orders and progress notes for further  interventions and desired outcomes.

## 2018-02-27 NOTE — PROGRESS NOTES
Speech path  Per nsg the pt is hallucinating and has been tachy and increased RR. Increase in O2 needed per physician. Pt is not appropriate for po today. We recommend NPO for now.    Stef Singh, SLP

## 2018-02-27 NOTE — PROGRESS NOTES
0715- Bedside shift change report given to Farhad Sanchez (oncoming nurse) by Vikki Merritt (offgoing nurse). Report included the following information SBAR, Kardex and MAR. 0830- Patient has increased work of breathing. Monitor displays sinus tach with PAC's in the 120's. Respiratory rate 30's. Lung sounds with expiratory wheezes and bibasilar crackles. Spoke with Dr. Mary Kate Stevenson who will place orders. 0830-  Complete assessment done. Neuro: Patient is alert and oriented to self and place; disoriented to date and situation; follows commands. Patient is experiencing some hallucinations and is talking to a dog she states is at the foot of her bed. Respiratory:  Lung sounds with expiratory wheezes and crackles. Respirations labored at rest.  Cardiac: Monitor displays sinus tach with PAC's; heart murmur heard; trace edema noted in ankles. GI: Patient with active bowel sounds; abdomen soft and non-tender; patient is NPO. : Patient is incontinent. Brief wet. Brief changed and tremaine care provided. Skin: Sacrum reddened; wound care consult in place. IV: Patient has three PIV's all working and intact. 0845- Lasix and solumedrol given. Purewick placed. 9338- Patient in SVT. HR in the 200's. Valsalva maneuver unsuccessful. Dr. Mary Kate Stevenson notified. SBP in the 110's (see frequent vitals). 9429- Diltiazem 10 mg IV push given. EKG done. Pharmacy called to notify them of STAT diltiazem GTT. Dr. Amy Mora paged at Dr. Mary Kate Stevenson request to notify him of event. Patient placed on a 100% non-rebreather due to sats 88% on 6 lpm of oxygen via nasal cannula. 0945- Patient's respiratory rate 40's and sats 88% on a non-rebreather. Remains in rapid a fib. Spoke with Dr. Mary Kate Stevenson. Orders received for BIPAP. Notified respiratory. 9137- Patient placed on BIPAP. Sats 92%. Haloperidol given for agitation. 0424- Diltiazem given. 1002- Increased GTT to 15 mg/hr. [id number]- Patient converted to Sinus tach with PAC's. 1100- Spoke with Dr. mAy Mora.   He is aware of the patient's a fib episode. BP in the low 100's. He stated as long as the patient is in a sinus tach and not in a fib he would not start amiodarone despite the blood pressure. 1230- BP in the 70's. Diltiazem stopped. 's-130's. Monitor displays Sinus tach with PAC's. BP now 90's. Spoke with Dr. Cristino Martin. If the HR goes above 130's start diltiazem back at a lower rate. If the patient goes into a fib contact Dr. Ericka Marie for amiodarone orders. Also discussed agitation. Precedex orders received. 1330- Precedex up RASS 2+. Will titrate to keep RASS at 0. 1500- Reassessed. Patient appears more comfortable. Her work of breathing has decreased. Lung sounds diminished. Patient is alert, less agitated. 1510- Patient taken off Bipap and placed on 6 liters of oxygen. 1800- Patient remain off BiPAP. She is in no distress. 1930- Report to Daysi Hatfield RN.

## 2018-02-27 NOTE — PROGRESS NOTES
Pulmonary / Critical Care Consultation    Assessment / Plan:    Pt refused labs this am.     Patchy multilobar pneumonia - in patient with known aspiration risk and underlying copd - feels better clinically but WBC elevated. Some suggestion of hematogenous spread and cavitation (may be secondary to underlying severe appearing emphysema rather than necrotizing process but agree with addition of vancomycin. Blood cultures have been negative    Acute Hypoxic Respiratory Failure, ON NC oxygen. Possible GI or Pulmonary Source of bleeding. Noted to have some RUL bleeding upon bronch yesterday. RUL hemoptysis? Leukocytosis    COPD / emphysema  -appears with acute exacerbation this am. Seems worse than yesterday. At this point had increase RR, increased wheezing. Tobacco abuse    CAD    DM    DNR    Rec:   --Added steroids this am  --increased the nebs frequency  --Will give dose of lasix this am.   -Oxygen to keep pox over 90%. --continue abx and follow clinically (on maxipime, flagyl and vanc) pt is pcn allergic  --aspiration precautions with SLP following  --follow up blood cultures are pending  --follow for future radiographic resolution  -Will keep in ICU today due to worsening clinical status  -Will ask palliative care to assist in her care. History / Subjective, last 24 hrs:     Pt is very thirsty this am. Has not had any loose stools. 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  female who  has a past medical history of Acute MI; Benzodiazepine abuse; CAD (coronary artery disease); COPD; Diabetes ([facility] [address] Emphysema; Essential hypertension; Gastrointestinal disorder; Hyperkalemia; Hyperlipemia; and Hypertension. admitted 2/15/2018 with pneumonia. Pt found to have aspiration risk and has been on abx. She clinically says she feels much better and is afebrile.   However, her WBC is rising and her CXR has shown increase in primarily upper lobe asdz.  Denies change in sputum. No fevers or chills. No chest pain. No hemoptysis    Allergies   Allergen Reactions    Iodinated Contrast- Oral And Iv Dye Anaphylaxis    Penicillin G Hives    Ciprofloxacin Unable to Obtain    Lyrica [Pregabalin] Other (comments)     lossing balance     Past Medical History:   Diagnosis Date    Acute MI     Benzodiazepine abuse     CAD (coronary artery disease)     COPD     Diabetes ([facility] [address]     Emphysema     Essential hypertension     Gastrointestinal disorder     ulcer    Hyperkalemia     Hyperlipemia     Hypertension       Past Surgical History:   Procedure Laterality Date    CORONARY STENT SINGLE W/PTCA      HX CORONARY STENT PLACEMENT      HX HEART CATHETERIZATION        Prior to Admission medications    Medication Sig Start Date End Date Taking? Authorizing Provider   metoprolol tartrate (LOPRESSOR) 25 mg tablet Take 1 Tab by mouth two (2) times a day. 2/17/18  Yes Aimee Castillo MD   aspirin 81 mg chewable tablet Take 1 Tab by mouth daily. 2/18/18  Yes Aimee Castillo MD   predniSONE (DELTASONE) 10 mg tablet Take 4 tabs daily for 3 days, then take 3 tabs daily for 3 days, then take 2 tabs daily for 3 days, then take 1 tab daily for 3 days, then stop 2/17/18  Yes Aimee Castillo MD   albuterol (PROVENTIL HFA, VENTOLIN HFA, PROAIR HFA) 90 mcg/actuation inhaler Take 2 Puffs by inhalation every four (4) hours as needed for Shortness of Breath. 2/17/18  Yes Aimee Castillo MD   oxyCODONE IR (ROXICODONE) 10 mg tab immediate release tablet Take 10 mg by mouth every four (4) hours as needed for Pain. Yes Historical Provider   pravastatin (PRAVACHOL) 80 mg tablet TAKE 1 TABLET BY MOUTH ONCE DAILY 7/6/15  Yes RAMON Hicks   ASPIRIN/ACETAMINOPHEN/CAFFEINE (EXCEDRIN EXTRA STRENGTH PO) Take 1 Tab by mouth daily as needed for Pain. Yes Historical Provider   clonazepam (KLONOPIN) 0.5 mg tablet Take 0.5 mg by mouth four (4) times daily.  5/7/11   Sedrick Hayes MD      No family history on file. Social History   Substance Use Topics    Smoking status: Current Every Day Smoker     Packs/day: 0.50     Years: 0.00     Types: Cigarettes    Smokeless tobacco: Never Used    Alcohol use No      ROS:  A comprehensive review of systems was negative except for that written in the HPI. Objective:  Patient Vitals for the past 4 hrs:   BP Temp Pulse Resp SpO2   18 0831 131/58 98.6 °F (37 °C) (!) 126 (!) 31 99 %   18 0735 - - - - 99 %   18 0500 115/61 - (!) 104 22 -     Temp (24hrs), Av.6 °F (37 °C), Min:98.4 °F (36.9 °C), Max:98.8 °F (37.1 °C)    CVP:          Intake/Output Summary (Last 24 hours) at 18 0838  Last data filed at 18 0902   Gross per 24 hour   Intake          1924.16 ml   Output                0 ml   Net          1924.16 ml     Blood Sugar:  Glucose   Date Value Ref Range Status   2018 118 (H) 65 - 100 mg/dL Final   2018 117 (H) 65 - 100 mg/dL Final   2018 89 65 - 100 mg/dL Final   2018 86 65 - 100 mg/dL Final   2018 135 (H) 65 - 100 mg/dL Final     Exam:  Thin wf in NAD  Alert  MM dry  Anicteric  Trachea midline  Some accessory use  Increased work of breathing this am. Has bilatearl wheezing and rhonchi. Kyphosis  Distant bs bilaterally  RRR, tachycardic. Soft / NT / + BS  Warm and dry  Trace edema  No cyanosis or clubbing    Lab data was reviewed. Radiology images were independently viewed and available reports were reviewed.     CXR:  Flat diaphragms, bilateral asdz with some suggestion of cavitation    CT - emphysema with patchy bilateral asdz    Lab:  Recent Labs      18   0349  18   0837  18   0749  18   0242  18   0135  18   1350  18   0725  18   0711  18   0114   WBC   --    --    --    --   33.5*   --    --    --   44.4*   HGB  9.9*  10.1*  6.8*   --   8.3*  6.8*   --   8.3*  9.2*   PLT   --    --    --    --   205   --    --    --   306   NA --    --    --   147*   --    --    --    --   141   K   --    --    --   3.5   --    --    --    --   3.2*   CL   --    --    --   119*   --    --    --    --   110*   CO2   --    --    --   16*   --    --    --    --   21   BUN   --    --    --   36*   --    --    --    --   27*   CREA   --    --    --   1.17*   --    --    --    --   1.07*   GLU   --    --    --   118*   --    --    --    --   117*   CA   --    --    --   7.3*   --    --    --    --   7.8*   MG   --    --    --    --    --    --    --    --   1.9   INR   --    --    --    --   1.8*   --    --    --    --    TROIQ   --    --    --    --    --    --    --   0.04   --    CPK   --    --    --    --    --    --    --   36   --    TBILI   --    --    --   0.3   --    --    --    --   0.3   SGOT   --    --    --   20   --    --    --    --   20   LAC   --    --    --    --   1.6  3.0*  2.9*   --    --      All Micro Results     Procedure Component Value Units Date/Time    CULTURE, BLOOD, PAIRED [[id number]] Collected:  02/24/18 0808    Order Status:  Completed Specimen:  Blood Updated:  02/27/18 8865     Special Requests: NO SPECIAL REQUESTS        Culture result: NO GROWTH 3 DAYS       CULTURE, SPUTUM/BRONCH/OTH [[id number]]  (Abnormal) Collected:  02/25/18 1830    Order Status:  Completed Specimen:  Sputum from Bronchial Washing Updated:  02/26/18 1254     Special Requests: NO SPECIAL REQUESTS        GRAM STAIN 2+ WBCS SEEN                 RARE EPITHELIAL CELLS SEEN      FEW BUDDING YEAST        Culture result:         MODERATE YEAST ISOLATED SO FAR (A)    CULTURE, FUNGUS [[id number]] Collected:  02/25/18 1830    Order Status:  Completed Specimen:  Bronchial Washing Updated:  02/25/18 2233    CULTURE, MRSA [[id number]] Collected:  02/24/18 1239    Order Status:  Completed Specimen:  Nares Updated:  02/25/18 2011     Special Requests: NO SPECIAL REQUESTS        Culture result: MRSA NOT PRESENT                     Screening of patient nares for MRSA is for surveillance purposes and, if positive, to facilitate isolation considerations in high risk settings. It is not intended for automatic decolonization interventions per se as regimens are not sufficiently effective to warrant routine use.     AFB CULTURE + SMEAR W/RFLX ID FROM CULTURE [[id number]] Collected:  02/25/18 1830    Order Status:  Completed Updated:  02/25/18 1914    CULTURE, STOOL [[id number]] Collected:  02/23/18 1156    Order Status:  Completed Specimen:  Stool Updated:  02/25/18 1054     Special Requests: NO SPECIAL REQUESTS        Campylobacter antigen NEGATIVE        Shiga toxin-producing E. coli Ag NO GROWTH        Culture result:         NO ROUTINE ENTERIC PATHOGENS ISOLATED INCLUDING SALMONELLA, SHIGELLA, YERSINIA, VIBRIO OR E. COLI 0157:H7      NO COLIFORMS ISOLATED                 HEAVY MIXED GRAM POSITIVE FLORENTINO ISOLATED , ONLY    OVA & PARASITES, STOOL [[id number]] Collected:  02/25/18 1000    Order Status:  Completed Specimen:  Stool from Stool Updated:  02/25/18 1008    CULTURE, RESPIRATORY/SPUTUM/BRONCH Ancel Fore STAIN [[id number]] Collected:  02/24/18 1530    Order Status:  Canceled Specimen:  Sputum from Sputum     OVA & PARASITES, STOOL [[id number]]     Order Status:  Canceled Specimen:  Stool from Stool     C. DIFFICILE (DNA) [[id number]]     Order Status:  Canceled     CULTURE, RESPIRATORY/SPUTUM/BRONCH Ancel Fore STAIN [[id number]] Collected:  02/21/18 1500    Order Status:  Canceled Specimen:  Sputum from Sputum     INFLUENZA A & B AG (RAPID TEST) [[id number]]     Order Status:  Canceled Specimen:  Nasopharyngeal from Nasal washing     CULTURE, BLOOD, PAIRED [[id number]] Collected:  02/15/18 1132    Order Status:  Completed Specimen:  Blood Updated:  02/20/18 0803     Special Requests: NO SPECIAL REQUESTS        Culture result: NO GROWTH 5 DAYS       CULTURE, RESPIRATORY/SPUTUM/BRONCH Ancel Fore STAIN [[id number]] Collected:  02/18/18 1415    Order Status:  Canceled Specimen:  Sputum from Sputum CULTURE, MRSA [[id number]]     Order Status:  Canceled Specimen:  Jamila Deleon MD
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
## 2018-02-27 NOTE — PROGRESS NOTES
Chart reviewed, pt discussed during interdisciplinary rounds. Pt not appropriate for participation in PT intervention as she is currently requiring BiPAP with HR elevated to 170-80s with pt at supine rest. Cardiology has been paged, awaiting orders. Will hold PT intervention at this time.     Emil Astudillo, PT, DPT

## 2018-02-27 NOTE — PROGRESS NOTES
Pt with HR of 200, appears to be SVT, Has bp of 135/82. Will give dose of diltiazem and ask Cards to assist who have been following pt. No distress. Alert and communicative. When assessed. VS: P: 200, /80 Pox of 93%.

## 2018-02-27 NOTE — PROGRESS NOTES
GI Progress Note Karina Mary)  NAME:Arin Hartley :1945 TOA:[id number]   PCP: Leonor Gallagher MD  Date/Time:  2018 8:39 AM   Assessment:   · ? GI bleed (hemopytsis vs hematemesis)  · No further bleeding since  w/ stable Hgb  · S/p bronchoscopy on  w/ blood in RUL but unclear source of bleeding  · Pt confused and unable to provide informed consent for procedures     Plan:   · Given pt's very poor respiratory status and no e/o overt GI bleeding risks outweigh benefits of endoscopic evaluation at this time  · Continue BID PPI    GI will sign off. Feel free to page w/ any questions or contact us if patient develops overt GI bleeding (i.e. Hematmesis, melena). Note that consent for endoscopy would need to be deemed an emergent procedure as pt does not appear to have decision making capacity at this time (she has no family that can provide consent). Clearly given above, an EGD is not an urgent/emergent procedure at this time. Subjective:   Discussed with RN events overnight. Pt w/o any e/o overt GI bleeding. Remains confused. Appears SOB/tachypneic. Complaint Y/N Description   Abdominal Pain     Hematemesis     Hematochezia     Melena     Constipation     Diarrhea     Dyspepsia     Dysphagia     Jaundiced     Nausea/vomiting       Review of Systems:  Symptom Y/N Comments  Symptom Y/N Comments   Fever/Chills    Chest Pain     Cough    Headaches     Sputum    Joint Pain     SOB/HANDLEY    Pruritis/Rash     Tolerating Diet    Other       Could NOT obtain due to:      Objective:   VITALS:   Last 24hrs VS reviewed since prior progress note.  Most recent are:  Visit Vitals    /58    Pulse (!) 126    Temp 98.6 °F (37 °C)    Resp (!) 31    Ht 5' 2\" (1.575 m)    Wt 44.4 kg (97 lb 12.8 oz)    SpO2 99%    BMI 17.89 kg/m2       Intake/Output Summary (Last 24 hours) at 18 0839  Last data filed at 18 5494   Gross per 24 hour   Intake          1924.16 ml   Output                0 ml Net          1924.16 ml     PHYSICAL EXAM:  General: WD, WN. Alert but confused, cooperative, +tachypneic  HEENT: NC, Atraumatic. PERRLA, EOMI. Anicteric sclerae. Lungs:  +tachypneic, CTA Bilaterally. No Wheezing/Rhonchi/Rales. Heart:  Regular  rhythm,  No murmur (), No Rubs, No Gallops  Abdomen: Soft, Non distended, Non tender.  +Bowel sounds, no HSM  Extremities: No c/c/e    Lab and Radiology Data Reviewed: (see below)    Medications Reviewed: (see below)  PMH/SH reviewed - no change compared to H&P  ________________________________________________________________________  Care Plan discussed with:  Patient x   Family     RN x              Consultant:       Odalys Romero MD     Procedures: see electronic medical records for all procedures/Xrays and details which were not copied into this note but were reviewed prior to creation of Plan. LABS:  Recent Labs      02/27/18   0349  02/26/18   0837   02/26/18   0135   02/25/18   0114   WBC   --    --    --   33.5*   --   44.4*   HGB  9.9*  10.1*   < >  8.3*   < >  9.2*   HCT  30.5*  30.6*   < >  25.7*   < >  29.7*   PLT   --    --    --   205   --   306    < > = values in this interval not displayed. Recent Labs      02/26/18 0242 02/25/18   0114   NA  147*  141   K  3.5  3.2*   CL  119*  110*   CO2  16*  21   BUN  36*  27*   CREA  1.17*  1.07*   GLU  118*  117*   CA  7.3*  7.8*   MG   --   1.9     Recent Labs      02/26/18   0242  02/25/18   0711  02/25/18   0114   SGOT  20   --   20   AP  91   --   126*   TP  5.0*   --   6.2*   ALB  1.2*   --   1.5*   GLOB  3.8   --   4.7*   LPSE   --   265   --      Recent Labs      02/26/18   0135   INR  1.8*   PTP  18.6*   APTT  41.0*      No results for input(s): FE, TIBC, PSAT, FERR in the last 72 hours.    Lab Results   Component Value Date/Time    Folate 10.0 02/21/2018 12:37 AM     Recent Labs      02/25/18   1415   PH  7.49*   PCO2  19*   PO2  61*     Recent Labs      02/25/18   0711   CPK  36     Lab Results   Component Value Date/Time    Color YELLOW/STRAW 02/15/2018 08:29 PM    Appearance CLEAR 02/15/2018 08:29 PM    Specific gravity 1.024 02/15/2018 08:29 PM    Specific gravity 1.025 06/17/2014 12:10 PM    pH (UA) 5.5 02/15/2018 08:29 PM    Protein 300 (A) 02/15/2018 08:29 PM    Glucose NEGATIVE  02/15/2018 08:29 PM    Ketone NEGATIVE  02/15/2018 08:29 PM    Bilirubin NEGATIVE  02/15/2018 08:29 PM    Urobilinogen 0.2 02/15/2018 08:29 PM    Nitrites NEGATIVE  02/15/2018 08:29 PM    Leukocyte Esterase NEGATIVE  02/15/2018 08:29 PM    Epithelial cells FEW 02/15/2018 08:29 PM    Bacteria NEGATIVE  02/15/2018 08:29 PM    WBC 0-4 02/15/2018 08:29 PM    RBC 5-10 02/15/2018 08:29 PM       MEDICATIONS:  Current Facility-Administered Medications   Medication Dose Route Frequency    potassium chloride 10 mEq in 50 ml IVPB  10 mEq IntraVENous Q2H    albuterol-ipratropium (DUO-NEB) 2.5 MG-0.5 MG/3 ML  3 mL Nebulization Q4H RT    methylPREDNISolone (PF) (SOLU-MEDROL) injection 40 mg  40 mg IntraVENous Q8H    azithromycin (ZITHROMAX) 500 mg in 0.9% sodium chloride 250 mL IVPB  500 mg IntraVENous Q24H    clotrimazole (MYCELEX) 1 % cream 1 Applicator  1 Applicator Vaginal QHS    0.9% sodium chloride infusion 250 mL  250 mL IntraVENous PRN    mupirocin (BACTROBAN) 2 % ointment   Both Nostrils BID    naloxone (NARCAN) injection 0.4 mg  0.4 mg IntraVENous Multiple    flumazenil (ROMAZICON) 0.1 mg/mL injection 0.2 mg  0.2 mg IntraVENous Multiple    midazolam (VERSED) injection 0.25-5 mg  0.25-5 mg IntraVENous Multiple    pantoprazole (PROTONIX) 40 mg in sodium chloride 10 mL injection  40 mg IntraVENous Q12H    fluticasone-vilanterol (BREO ELLIPTA) 100mcg-25mcg/puff  1 Puff Inhalation DAILY    lactobac ac& pc-s.therm-b.anim (FLORENTINO Q/RISAQUAD)  1 Cap Oral DAILY    metroNIDAZOLE (FLAGYL) IVPB premix 500 mg  500 mg IntraVENous Q8H    vancomycin (VANCOCIN) 750 mg in 0.9% sodium chloride 250 mL IVPB  750 mg IntraVENous Q18H    cefepime (MAXIPIME) 2 g in 0.9 %  mL IVPB  2 g IntraVENous Q12H    chlorhexidine (PERIDEX) 0.12 % mouthwash 15 mL  15 mL Oral Q12H    nitroglycerin (NITROSTAT) tablet 0.4 mg  0.4 mg SubLINGual PRN    aluminum-magnesium hydroxide (MAALOX) oral suspension 15 mL  15 mL Oral DAILY PRN    haloperidol lactate (HALDOL) injection 2 mg  2 mg IntraVENous Q6H PRN    nicotine (NICODERM CQ) 14 mg/24 hr patch 1 Patch  1 Patch TransDERmal DAILY    HYDROcodone-acetaminophen (NORCO) 5-325 mg per tablet 1 Tab  1 Tab Oral Q4H PRN    pravastatin (PRAVACHOL) tablet 80 mg  80 mg Oral DAILY    sodium chloride (NS) flush 5-10 mL  5-10 mL IntraVENous Q8H    sodium chloride (NS) flush 5-10 mL  5-10 mL IntraVENous PRN    acetaminophen (TYLENOL) tablet 650 mg  650 mg Oral Q6H PRN    ondansetron (ZOFRAN) injection 4 mg  4 mg IntraVENous Q6H PRN    docusate sodium (COLACE) capsule 100 mg  100 mg Oral DAILY PRN    guaiFENesin ER (MUCINEX) tablet 600 mg  600 mg Oral Q12H    guaiFENesin (ROBITUSSIN) 100 mg/5 mL oral liquid 100 mg  100 mg Oral TID PRN

## 2018-02-27 NOTE — PROGRESS NOTES
Care Management:    Patient on BIPAP now and unable to communicate with care manager. Anticipate discharge needs and patient has chosen SNF [facility] [place], [facility]. or [address] [place] [address],[address] . We will cont to follow and see how she does with PT and OT and see what their recommendations are. Patient not medically stable to work with PT and OT today. Will also talk with patient about MPOA once she is off bipap and alert and oriented enough to communicate with us.     Abhi Meyer [facility] ac [phone]

## 2018-02-27 NOTE — PROGRESS NOTES
Family meeting with patient son Elsa Hess tomorrow at 7:30 am, per son patient has 4 children , he is oldest, rest of three children are estranged . We plan to get more information , tomorrow from his son .

## 2018-02-27 NOTE — CONSULTS
Palliative Medicine Consult  Daniel: [phone])    Patient Name: Ryan Danielson  YOB: 1945    Date of Initial Consult: 2/27/18  Reason for Consult: end stage disease  Requesting Provider: Bee Ortega MD  Primary Care Physician: Cecelia Card MD     SUMMARY:   Ryan Danielson is a 74 y. o. with a past history of copd, NOT ON HOME OXYGEN , HTN AND CAD, chronic smoker,  who was admitted on 2/15/2018 from home with a diagnosis of sob and hypoxia. Hospital course is notable for intermittent confusion ,      1. Hematemesis  2. Hemoptysis s/p bronch with bleeding in RUL but no clear source of bleeding, SVT,  respiratory distress on and off on bipap. Cxr:2/26/18 :        Current medical issues leading to Palliative Medicine involvement include: end stage disease, severe copd with multilobar pneumonia and aspiration, patient refusing some treatment . lives independantly , has 3 childern , oldest son Chandrika Patiño is MPOA, chronic smoker , smokes actively. PALLIATIVE DIAGNOSES:   1. Sob     2. Intermittent confusion     3. Debility    4. Copd , multilobar pneumonia    5. Goals of care. PLAN:   Patient is alert, oriented to person , place,  understand some of  of medical condition , pneumonia \" heart racing at 150\", intermittent confusion, answer some question and then then becomes unclear    - I started the dialogue of appointing MPOA, she tells me she has one son Jett Vargas who she has not seen in 2 years , she is not sure, if he would be her \" voice \", she shared her friend Ivana Morales can make decisions on her behalf but then she is confused thinks favio is her sister, it is noted in chart that Ivana Morales is her land lord, at this point she is not capable of appointing MPOA, We will follow up tommorow to see if she is mentally clear to complete Advance directives and have conversation about goals of care.       - I spoke to her son Chandrika Patiño , who is the contact point on chart, he shared patient has 4 children , he is the eldest, three of her children are estranged , we plan to meet tomorrow at 9: 30 am .    Initial consult note routed to primary continuity provider  Communicated plan of care with: Palliative IDT and be saba Kumar and pulmonologist Dr Suzanne Machado. GOALS OF CARE / TREATMENT PREFERENCES:     GOALS OF CARE:  Patient/Health Care Proxy Stated Goals:  (treatment of acute medical issues.)      TREATMENT PREFERENCES:   Code Status: DNR    Advance Care Planning:  Advance Care Planning 2/16/2018   Patient's Healthcare Decision Maker is: Legal Next of Dale 67   Primary Decision Maker Name Tate Parham   Primary Decision Maker Phone Number [phone]   Primary Decision Maker Relationship to Patient Adult child   Confirm Advance Directive None   Patient Would Like to Complete Advance Directive No   Does the patient have other document types Do Not Resuscitate       Medical Interventions: Limited additional interventions (bipap if in respiratory distress.)   Other Instructions: Other:    As far as possible, the palliative care team has discussed with patient / health care proxy about goals of care / treatment preferences for patient. HISTORY:     History obtained from:chart, bed side RN    CHIEF COMPLAINT: Admitted for above. HPI/SUBJECTIVE:    The patient is:   [] Verbal and participatory limited, because of some intermittent confusion. She tells me \" she is feeling better\"    She denies any chest pain , nausea or vomiting . She denies any pain .       Clinical Pain Assessment (nonverbal scale for severity on nonverbal patients):   Clinical Pain Assessment  Severity: 0     Activity (Movement): Lying quietly, normal position    Duration: for how long has pt been experiencing pain (e.g., 2 days, 1 month, years)  Frequency: how often pain is an issue (e.g., several times per day, once every few days, constant)     FUNCTIONAL ASSESSMENT:     Palliative Performance Scale (PPS):  PPS: 40       PSYCHOSOCIAL/SPIRITUAL SCREENING:     Palliative IDT has assessed this patient for cultural preferences / practices and a referral made as appropriate to needs (Cultural Services, Patient Advocacy, Ethics, etc.)    Advance Care Planning:  Advance Care Planning 2/16/2018   Patient's Healthcare Decision Maker is: Legal Next of Dale Jonas   Primary Decision Maker Name Sidney Cruz   Primary Decision Maker Phone Number [phone]   Primary Decision Maker Relationship to Patient Adult child   Confirm Advance Directive None   Patient Would Like to Complete Advance Directive No   Does the patient have other document types Do Not Resuscitate       Any spiritual / Orthodox concerns:  [] Yes /  [x] No    Caregiver Burnout:  [] Yes /  [x] No /  [] No Caregiver Present      Anticipatory grief assessment:   [x] Normal  / [] Maladaptive       ESAS Anxiety: Anxiety: 0    ESAS Depression:          REVIEW OF SYSTEMS:     Positive and pertinent negative findings in ROS are noted above in HPI. The following systems were [x] reviewed limited because of patient factors [] unable to be reviewed as noted in HPI  Other findings are noted below. Systems: constitutional, ears/nose/mouth/throat, respiratory, gastrointestinal, genitourinary, musculoskeletal, integumentary, neurologic, psychiatric, endocrine. Positive findings noted below. Modified ESAS Completed by: provider   Fatigue: 6       Pain: 0   Anxiety: 0       Dyspnea: 3           Stool Occurrence(s): 1        PHYSICAL EXAM:     From RN flowsheet:  Wt Readings from Last 3 Encounters:   02/21/18 97 lb 12.8 oz (44.4 kg)   08/02/17 105 lb (47.6 kg)   05/10/17 104 lb 8 oz (47.4 kg)     Blood pressure 102/50, pulse 78, temperature 97.6 °F (36.4 °C), resp. rate 18, height 5' 2\" (1.575 m), weight 97 lb 12.8 oz (44.4 kg), SpO2 98 %.     Pain Scale 1: Numeric (0 - 10)  Pain Intensity 1: 0  Pain Onset 1: acute  Pain Location 1: Neck  Pain Orientation 1: Posterior  Pain Description 1: Aching  Pain Intervention(s) 1: Position  Last bowel movement, if known:     Constitutional: alert, oriented x2, place and person , intermittently confused. Eyes: pupils equal, anicteric  ENMT: no nasal discharge, moist mucous membranes  Cardiovascular: regular rhythm, distal pulses intact  Respiratory: breathing not labored, symmetric  Gastrointestinal: soft non-tender, +bowel sounds  Musculoskeletal: no deformity, no tenderness to palpation  Skin: warm, dry  Neurologic: following commands, intermittent confusion . Other:       HISTORY:     Active Problems:    Acute exacerbation of chronic obstructive pulmonary disease (COPD) ([facility] [address] (2/15/2018)      Benzodiazepine abuse (2/17/2018)      Acute MI ()      Past Medical History:   Diagnosis Date    Acute MI     Benzodiazepine abuse     CAD (coronary artery disease)     COPD     Diabetes ([facility] [address]     Emphysema     Essential hypertension     Gastrointestinal disorder     ulcer    Hyperkalemia     Hyperlipemia     Hypertension       Past Surgical History:   Procedure Laterality Date    CORONARY STENT SINGLE W/PTCA      HX CORONARY STENT PLACEMENT      HX HEART CATHETERIZATION        No family history on file. History reviewed, no pertinent family history.   Social History   Substance Use Topics    Smoking status: Current Every Day Smoker     Packs/day: 0.50     Years: 0.00     Types: Cigarettes    Smokeless tobacco: Never Used    Alcohol use No     Allergies   Allergen Reactions    Iodinated Contrast- Oral And Iv Dye Anaphylaxis    Penicillin G Hives    Ciprofloxacin Unable to Obtain    Lyrica [Pregabalin] Other (comments)     lossing balance      Current Facility-Administered Medications   Medication Dose Route Frequency    albuterol-ipratropium (DUO-NEB) 2.5 MG-0.5 MG/3 ML  3 mL Nebulization Q4H RT    methylPREDNISolone (PF) (SOLU-MEDROL) injection 40 mg  40 mg IntraVENous Q8H    methylPREDNISolone (PF) (SOLU-MEDROL) 40 mg/mL injection        dilTIAZem (CARDIZEM) 5 mg/mL injection        dilTIAZem (CARDIZEM) 125 mg in 0.9% sodium chloride 125 mL infusion  0-15 mg/hr IntraVENous TITRATE    VANCOMYCIN TROUGH  1 Each Other ONCE    dexmedeTOMidine (PRECEDEX) 400 mcg in 0.9% sodium chloride 100 mL infusion  0.2-1.4 mcg/kg/hr IntraVENous TITRATE    miconazole (SECURA) 2 % extra thick cream   Topical BID    azithromycin (ZITHROMAX) 500 mg in 0.9% sodium chloride 250 mL IVPB  500 mg IntraVENous Q24H    clotrimazole (MYCELEX) 1 % cream 1 Applicator  1 Applicator Vaginal QHS    mupirocin (BACTROBAN) 2 % ointment   Both Nostrils BID    pantoprazole (PROTONIX) 40 mg in sodium chloride 10 mL injection  40 mg IntraVENous Q12H    fluticasone-vilanterol (BREO ELLIPTA) 100mcg-25mcg/puff  1 Puff Inhalation DAILY    lactobac ac& pc-s.therm-b.anim (FLORENTINO Q/RISAQUAD)  1 Cap Oral DAILY    metroNIDAZOLE (FLAGYL) IVPB premix 500 mg  500 mg IntraVENous Q8H    vancomycin (VANCOCIN) 750 mg in 0.9% sodium chloride 250 mL IVPB  750 mg IntraVENous Q18H    cefepime (MAXIPIME) 2 g in 0.9 %  mL IVPB  2 g IntraVENous Q12H    chlorhexidine (PERIDEX) 0.12 % mouthwash 15 mL  15 mL Oral Q12H    nitroglycerin (NITROSTAT) tablet 0.4 mg  0.4 mg SubLINGual PRN    aluminum-magnesium hydroxide (MAALOX) oral suspension 15 mL  15 mL Oral DAILY PRN    haloperidol lactate (HALDOL) injection 2 mg  2 mg IntraVENous Q6H PRN    nicotine (NICODERM CQ) 14 mg/24 hr patch 1 Patch  1 Patch TransDERmal DAILY    pravastatin (PRAVACHOL) tablet 80 mg  80 mg Oral DAILY    sodium chloride (NS) flush 5-10 mL  5-10 mL IntraVENous Q8H    sodium chloride (NS) flush 5-10 mL  5-10 mL IntraVENous PRN    acetaminophen (TYLENOL) tablet 650 mg  650 mg Oral Q6H PRN    ondansetron (ZOFRAN) injection 4 mg  4 mg IntraVENous Q6H PRN    docusate sodium (COLACE) capsule 100 mg  100 mg Oral DAILY PRN    guaiFENesin ER (MUCINEX) tablet 600 mg  600 mg Oral Q12H    guaiFENesin (ROBITUSSIN) 100 mg/5 mL oral liquid 100 mg  100 mg Oral TID PRN          LAB AND IMAGING FINDINGS:     Lab Results   Component Value Date/Time    WBC 33.5 (H) 02/26/2018 01:35 AM    HGB 9.9 (L) 02/27/2018 03:49 AM    PLATELET 580 40/10/3151 01:35 AM     Lab Results   Component Value Date/Time    Sodium 147 (H) 02/26/2018 02:42 AM    Potassium 3.5 02/26/2018 02:42 AM    Chloride 119 (H) 02/26/2018 02:42 AM    CO2 16 (L) 02/26/2018 02:42 AM    BUN 36 (H) 02/26/2018 02:42 AM    Creatinine 1.17 (H) 02/26/2018 02:42 AM    Calcium 7.3 (L) 02/26/2018 02:42 AM    Magnesium 1.9 02/25/2018 01:14 AM      Lab Results   Component Value Date/Time    AST (SGOT) 20 02/26/2018 02:42 AM    Alk. phosphatase 91 02/26/2018 02:42 AM    Protein, total 5.0 (L) 02/26/2018 02:42 AM    Albumin 1.2 (L) 02/26/2018 02:42 AM    Globulin 3.8 02/26/2018 02:42 AM     Lab Results   Component Value Date/Time    INR 1.8 (H) 02/26/2018 01:35 AM    Prothrombin time 18.6 (H) 02/26/2018 01:35 AM    aPTT 41.0 (H) 02/26/2018 01:35 AM      No results found for: IRON, FE, TIBC, IBCT, PSAT, FERR   Lab Results   Component Value Date/Time    pH 7.49 (H) 02/25/2018 02:15 PM    PCO2 19 (L) 02/25/2018 02:15 PM    PO2 61 (L) 02/25/2018 02:15 PM     No components found for: [place]   Lab Results   Component Value Date/Time    CK 36 02/25/2018 07:11 AM    CK - MB 2.3 02/20/2018 06:38 PM                Total time:   Counseling / coordination time, spent as noted above:   > 50% counseling / coordination?:     Prolonged service was provided for  []30 min   []75 min in face to face time in the presence of the patient, spent as noted above. Time Start:   Time End:   Note: this can only be billed with 80155 (initial) or 59867 (follow up). If multiple start / stop times, list each separately.   ly.

## 2018-02-27 NOTE — PROGRESS NOTES
Palliative Medicine  [facility]: [phone])  [facility]: [phone])      Resuscitation Status: DNR   Durable DNR addressed? [] Yes   [] No    [] Not Applicable    Advance Care Planning 2/16/2018   Patient's Healthcare Decision Maker is: Legal Next of Kin   Primary Decision Maker Name Terry Brower   Primary Decision Maker Phone Number [phone]   Primary Decision Maker Relationship to Patient Adult child   Confirm Advance Directive None   Patient Would Like to Complete Advance Directive No   Does the patient have other document types Do Not Resuscitate     Dr. Kaiden Corey and this LCSW met with patient to assess if she was able to make decisions about a decision maker for her health care. Patient asleep initially, intermittently confused. Alert at times and able to answer some questions without confusion but then unable to clearly answer other questions. Patient has a son Deborah Tucker, who would be her NOK but she hasn't seen him in about 2 years. She is not sure if he would be able to be her \"voice\" if she is unable to make decisions for herself. Patient then noted that her \"friend\" Tristan Boyce would be her mPOA. She intermittently noted that Tristan Boyec was her sister and then her friend. [place] later that Tristan Boyce is her landlord but does assist her. Mimi's contact number was obtained by patient, from her cell phone # [phone]. Patient was clear today in clarifying that if her breathing were to get compromised and she needed to have a bi-PAP (without it she may die), she would want to have the bi-PAP. She is not ready to die at this time, per her wishes. Due to patient's intermittent confusion regarding her AMD, will need to reassess patient tomorrow to see if she is clearer in terms of decision making.

## 2018-02-28 NOTE — PROGRESS NOTES
0235: Placed pt on BiPAP d/t SpO2 80s on NRBM.      3520: Pt placed on HFNC since pt refusing BiPAP mask and SpO2 in upper 70s.    0700: Pt refusing nebulizer treatments t/o night

## 2018-02-28 NOTE — CONSULTS
Palliative Medicine Consult  Sanchez: [phone] ([phone])    Patient Name: Issa Paris  YOB: 1945    Date of Initial Consult: 2/27/18  Reason for Consult: end stage disease  Requesting Provider: Maria Ngo MD  Primary Care Physician: Leonor Gallagher MD     SUMMARY:   Issa Paris is a 74 y. o. with a past history of copd, NOT ON HOME OXYGEN , HTN AND CAD, chronic smoker,  who was admitted on 2/15/2018 from home with a diagnosis of sob and hypoxia. Hospital course is notable:     1. Hematemesis? 2. Hemoptysis s/p bronch with bleeding in RUL but no clear source of bleeding, SVT,  respiratory distress on and off on bipap, confusion, hypotension. Current medical issues leading to Palliative Medicine involvement include: end stage disease, severe copd with multilobar pneumonia and aspiration, patient refusing some treatment . lives independantly , has 4 childern , estranged from her, oldest son Mercedes Alvarez last seen her 2 years ago, chronic smoker , smokes actively. Nikki Jimenez is patient friend who is closest to her, per chart review , patient has h/o taking clonazepam , Nyquil and sleeping pill frequently . PALLIATIVE DIAGNOSES:   1. Sob / respiratory distress    2. Encephalopathy    3. Hypotension     4. Tachycardia(SVT with rapid atrial fib on amiodrone drip )     5. Debility    6. Copd , multilobar pneumonia    7. Benzodiazepine abuse ? 6.. Goals of care. PLAN:   Patient got worse during the course of last night , hypoxic, on high flow, rapid atrial fibrillation , confused . Patient has 4 children 2 sons and 2 daughters /next of kin , who are estranged x 27 years, Matt Rain is the oldest son , who has last  seen his mom ,two years ago, he is the contact point. Family meeting  Done at 7:30 am with 3 out of 4 children /next of kin.    - met with Mattnneka Rain and [employer] personally and Aleksey Carbajal over the phone.    - explained Palliative care service and reason for visit.    - nam shared , patient has been  multiple times, she left her 3 children in orphanage at very young age , 1 - 3 years, all her 3 chidren lives in [facility] , and they are very close and supportive for each other, Guera Martel moved her mom from [place], she was home less then . - family is concerned patient had mental health issues , believe never treated. - explained medical issues , respiratory distress, pneumonia, severe eliot disease, and heart issues, she is not going to make it out of hospital and is decompensated very quickly despite all the medical treatment, shared her code status is DNR if her heart stops. Vicenta Moran wanted to make closure with patient Tone Carvalho was reluctant , however he decided to visit patient at bed side.    -Family is in agreement not to prolong her suffering and let her pass away \" peacefully \"    - we discussed comfort care medication , stopping treatment of medical issues lab work and slowly weaning her high flow oxygen. - we discussed hospice services. Paloma Aleman and Raissa are ok for hospice to contact either of them  Mimi\"s contact number is #[phone]. Psychosocial support: we will continue to support patient and family through this difficult course. Requested  to visit for support. Initial consult note routed to primary continuity provider  Communicated plan of care with: Palliative IDT and be saba Shah , case alan Sinha .        GOALS OF CARE / TREATMENT PREFERENCES:     GOALS OF CARE:  Patient/Health Care Proxy Stated Goals: Comfort      TREATMENT PREFERENCES:   Code Status: DNR    Advance Care Planning:  Advance Care Planning 2/16/2018   Patient's Healthcare Decision Maker is: Legal Next of Kin   Primary Decision Maker Name Chastity Gonzales   Primary Decision Maker Phone Number [phone]   Primary Decision Maker Relationship to Patient Adult child   Confirm Advance Directive None   Patient Would Like to Complete Advance Directive No   Does the patient have other document types Do Not Resuscitate       Medical Interventions: Limited additional interventions (bipap if in respiratory distress.)   Other Instructions: Other:    As far as possible, the palliative care team has discussed with patient / health care proxy about goals of care / treatment preferences for patient. HISTORY:     History obtained from:chart, bed side RN    CHIEF COMPLAINT: Admitted for above. HPI/SUBJECTIVE:    The patient is:   [] Verbal and participatory limited, because of some intermittent confusion. She tells me \" she is feeling better\"    She denies any chest pain , nausea or vomiting . She denies any pain .     2/28/18: over night events noted , Atrail fib with RVR on amidorone drip, hypoxia , respiratory distress, on high     Clinical Pain Assessment (nonverbal scale for severity on nonverbal patients):   Clinical Pain Assessment  Severity: 0     Activity (Movement): Lying quietly, normal position    Duration: for how long has pt been experiencing pain (e.g., 2 days, 1 month, years)  Frequency: how often pain is an issue (e.g., several times per day, once every few days, constant)     FUNCTIONAL ASSESSMENT:     Palliative Performance Scale (PPS):  PPS: 20       PSYCHOSOCIAL/SPIRITUAL SCREENING:     Palliative IDT has assessed this patient for cultural preferences / practices and a referral made as appropriate to needs (Cultural Services, Patient Advocacy, Ethics, etc.)    Advance Care Planning:  Advance Care Planning 2/16/2018   Patient's Healthcare Decision Maker is: Legal Next of Kin   Primary Decision Maker Name Alok Hemphill   Primary Decision Maker Phone Number [phone]   Primary Decision Maker Relationship to Patient Adult child   Confirm Advance Directive None   Patient Would Like to Complete Advance Directive No   Does the patient have other document types Do Not Resuscitate       Any spiritual / Mu-ism concerns:  [] Yes /  [] No    Caregiver Burnout:  [] Yes /  [] No /  [] No Caregiver Present      Anticipatory grief assessment:   [] Normal  / [] Maladaptive       ESAS Anxiety:     ESAS Depression:     Unable to evaluate above because of patient factors. REVIEW OF SYSTEMS:     Positive and pertinent negative findings in ROS are noted above in HPI. The following systems were [] reviewed limited because of patient factors [x] unable to be reviewed as noted in HPI  Other findings are noted below. Systems: constitutional, ears/nose/mouth/throat, respiratory, gastrointestinal, genitourinary, musculoskeletal, integumentary, neurologic, psychiatric, endocrine. Positive findings noted below. Modified ESAS Completed by: provider   Fatigue: 6       Pain: 0   Anxiety: 0     Anorexia: 4 Dyspnea: 3           Stool Occurrence(s): 1        PHYSICAL EXAM:     From RN flowsheet:  Wt Readings from Last 3 Encounters:   02/21/18 97 lb 12.8 oz (44.4 kg)   08/02/17 105 lb (47.6 kg)   05/10/17 104 lb 8 oz (47.4 kg)     Blood pressure (!) 75/25, pulse 84, temperature 97.7 °F (36.5 °C), resp. rate 19, height 5' 2\" (1.575 m), weight 97 lb 12.8 oz (44.4 kg), SpO2 96 %. Pain Scale 1: Numeric (0 - 10)  Pain Intensity 1: 0  Pain Onset 1: acute  Pain Location 1: Neck  Pain Orientation 1: Posterior  Pain Description 1: Aching  Pain Intervention(s) 1: Position  Last bowel movement, if known:     Constitutional: confused on high flow oxygen \" intermittently confused. Eyes: pupils equal, anicteric  Respiratory: breathing  labored, symmetric  Gastrointestinal: soft non-tender, +bowel sounds  Skin: warm, dry  Neurologic: confused , briefly arousable.      HISTORY:     Active Problems:    Acute exacerbation of chronic obstructive pulmonary disease (COPD) ([facility] [address] (2/15/2018)      Benzodiazepine abuse (2/17/2018)      Acute MI ()      Past Medical History:   Diagnosis Date    Acute MI     Benzodiazepine abuse     CAD (coronary artery disease)  COPD     Diabetes ([facility] [address]     Emphysema     Essential hypertension     Gastrointestinal disorder     ulcer    Hyperkalemia     Hyperlipemia     Hypertension       Past Surgical History:   Procedure Laterality Date    CORONARY STENT SINGLE W/PTCA      HX CORONARY STENT PLACEMENT      HX HEART CATHETERIZATION        No family history on file. History reviewed, no pertinent family history.   Social History   Substance Use Topics    Smoking status: Current Every Day Smoker     Packs/day: 0.50     Years: 0.00     Types: Cigarettes    Smokeless tobacco: Never Used    Alcohol use No     Allergies   Allergen Reactions    Iodinated Contrast- Oral And Iv Dye Anaphylaxis    Penicillin G Hives    Ciprofloxacin Unable to Obtain    Lyrica [Pregabalin] Other (comments)     lossing balance      Current Facility-Administered Medications   Medication Dose Route Frequency    amiodarone (CORDARONE) 375 mg/250 mL D5W infusion  0.5-1 mg/min IntraVENous CONTINUOUS    sodium bicarbonate 8.4 % (1 mEq/mL) injection        albuterol-ipratropium (DUO-NEB) 2.5 MG-0.5 MG/3 ML  3 mL Nebulization Q4H RT    methylPREDNISolone (PF) (SOLU-MEDROL) injection 40 mg  40 mg IntraVENous Q8H    dexmedeTOMidine (PRECEDEX) 400 mcg in 0.9% sodium chloride 100 mL infusion  0.2-1.4 mcg/kg/hr IntraVENous TITRATE    miconazole (SECURA) 2 % extra thick cream   Topical BID    azithromycin (ZITHROMAX) 500 mg in 0.9% sodium chloride (MBP/ADV) 250 mL  500 mg IntraVENous Q24H    clotrimazole (MYCELEX) 1 % cream 1 Applicator  1 Applicator Vaginal QHS    mupirocin (BACTROBAN) 2 % ointment   Both Nostrils BID    pantoprazole (PROTONIX) 40 mg in sodium chloride 10 mL injection  40 mg IntraVENous Q12H    fluticasone-vilanterol (BREO ELLIPTA) 100mcg-25mcg/puff  1 Puff Inhalation DAILY    lactobac ac& pc-s.therm-b.anim (FLORENTINO Q/RISAQUAD)  1 Cap Oral DAILY    metroNIDAZOLE (FLAGYL) IVPB premix 500 mg  500 mg IntraVENous Q8H    vancomycin (VANCOCIN) 750 mg in 0.9% sodium chloride 250 mL IVPB  750 mg IntraVENous Q18H    cefepime (MAXIPIME) 2 g in 0.9 %  mL IVPB  2 g IntraVENous Q12H    chlorhexidine (PERIDEX) 0.12 % mouthwash 15 mL  15 mL Oral Q12H    nitroglycerin (NITROSTAT) tablet 0.4 mg  0.4 mg SubLINGual PRN    aluminum-magnesium hydroxide (MAALOX) oral suspension 15 mL  15 mL Oral DAILY PRN    nicotine (NICODERM CQ) 14 mg/24 hr patch 1 Patch  1 Patch TransDERmal DAILY    pravastatin (PRAVACHOL) tablet 80 mg  80 mg Oral DAILY    sodium chloride (NS) flush 5-10 mL  5-10 mL IntraVENous Q8H    sodium chloride (NS) flush 5-10 mL  5-10 mL IntraVENous PRN    acetaminophen (TYLENOL) tablet 650 mg  650 mg Oral Q6H PRN    ondansetron (ZOFRAN) injection 4 mg  4 mg IntraVENous Q6H PRN    docusate sodium (COLACE) capsule 100 mg  100 mg Oral DAILY PRN    guaiFENesin ER (MUCINEX) tablet 600 mg  600 mg Oral Q12H    guaiFENesin (ROBITUSSIN) 100 mg/5 mL oral liquid 100 mg  100 mg Oral TID PRN          LAB AND IMAGING FINDINGS:     Lab Results   Component Value Date/Time    WBC 46.7 (H) 02/28/2018 03:11 AM    HGB 9.8 (L) 02/28/2018 03:11 AM    PLATELET 200 29/84/0933 03:11 AM     Lab Results   Component Value Date/Time    Sodium 142 02/28/2018 03:11 AM    Potassium 3.9 02/28/2018 03:11 AM    Chloride 117 (H) 02/28/2018 03:11 AM    CO2 10 (LL) 02/28/2018 03:11 AM    BUN 50 (H) 02/28/2018 03:11 AM    Creatinine 1.71 (H) 02/28/2018 03:11 AM    Calcium 7.4 (L) 02/28/2018 03:11 AM    Magnesium 2.1 02/28/2018 03:11 AM      Lab Results   Component Value Date/Time    AST (SGOT) 20 02/26/2018 02:42 AM    Alk.  phosphatase 91 02/26/2018 02:42 AM    Protein, total 5.0 (L) 02/26/2018 02:42 AM    Albumin 1.2 (L) 02/26/2018 02:42 AM    Globulin 3.8 02/26/2018 02:42 AM     Lab Results   Component Value Date/Time    INR 1.8 (H) 02/26/2018 01:35 AM    Prothrombin time 18.6 (H) 02/26/2018 01:35 AM    aPTT 41.0 (H) 02/26/2018 01:35 AM      No results found for: IRON, FE, TIBC, IBCT, PSAT, FERR   Lab Results   Component Value Date/Time    pH 7.49 (H) 02/25/2018 02:15 PM    PCO2 19 (L) 02/25/2018 02:15 PM    PO2 61 (L) 02/25/2018 02:15 PM     No components found for: [place]   Lab Results   Component Value Date/Time    CK 36 02/25/2018 07:11 AM    CK - MB 2.3 02/20/2018 06:38 PM                Total time: 65 mins  Counseling / coordination time, spent as noted above:   > 50% counseling / coordination?:     Prolonged service was provided for  []30 min   []75 min in face to face time in the presence of the patient, spent as noted above. Time Start: 7:30 am  Time End: 8:36 am  Note: this can only be billed with 12702 (initial) or 84168 (follow up). If multiple start / stop times, list each separately.   ly.

## 2018-02-28 NOTE — PROGRESS NOTES
Hospitalist Progress Note    NAME: Patricia Arora   :  1945   MRN:  [id number]       Assessment / Plan:  Hemoptysis  -s/p bronchoscopy, following BAL washings  -no further episodes    SVTs/tachy  -likely related to pulm process  -received cardizem push, pressures dropped  -cardio on board  -on bipap to decr work or breathing, tachy improved will likely transition off bipap    Acute normocytic anemia  -FOBT + but no gross blood per stool  -Hb responded to transfusion  -GI on board, no urgent need for EGD, watch and wait and eval pending course    Acute COPD exacerbation due to pneumonia  Sepsis   c/w IVF, ABX    Persistant leukocytosis  -monitor    Aspiration PNA:   -will c/w Cefepime/Flagyl/Vancomycin/Zithromax, bronchodilators. Hx of Tobacco use  -no current use    Elevated troponin upon admission  Paroxysmal atrial fibrillation  - cardiology following; no further chest pain. Last stent placement in .   - continue with ASA and metoprolol  - remain in junctional rhythm with rate of 80 (PA interval 0.1)  - normal Echo. Stress test revealed no evidence of MI    CAD, HTN, HLD  -continue asprin, BB, statin     Code Status: dnr  Surrogate Decision [facility]  DVT Prophylaxis: SCD  Recommended Disposition: rehab     Subjective:     Chief Complaint / Reason for Physician Visit  Admitted for diarrhea/COPD exacerbation, subsuequently treated for PNA. Pt is on bipap during rounds this AM. Discussed with RN/respiratory therapist. Pt was having SVTs with rate > 200 early in AM, received cardizem and pressures dropped. Was put on bipap to decrease work of breathing. Pt somewhat anxious during visit but otherwise no acute complaints. Discussed with RN events overnight.      Review of Systems:  Symptom Y/N Comments  Symptom Y/N Comments   Fever/Chills n   Chest Pain n    Poor Appetite n   Edema     Cough n   Abdominal Pain n    Sputum n   Joint Pain     SOB/HANDLEY Pruritis/Rash     Nausea/vomit n   Tolerating PT/OT     Diarrhea    Tolerating Diet     Constipation    Other       Could NOT obtain due to:      Objective:     VITALS:   Last 24hrs VS reviewed since prior progress note.  Most recent are:  Patient Vitals for the past 24 hrs:   Temp Pulse Resp BP SpO2   02/27/18 1800 - 89 24 125/58 93 %   02/27/18 1730 - 75 16 98/60 97 %   02/27/18 1700 - 77 17 101/50 94 %   02/27/18 1630 - 93 23 104/52 92 %   02/27/18 1615 - 78 18 102/50 -   02/27/18 1600 - 94 26 108/58 (!) 88 %   02/27/18 1530 - (!) 104 29 116/59 90 %   02/27/18 1502 - - - - 98 %   02/27/18 1500 - (!) 107 27 110/58 98 %   02/27/18 1430 97.6 °F (36.4 °C) (!) 107 26 106/53 99 %   02/27/18 1400 - (!) 121 30 103/45 98 %   02/27/18 1330 - (!) 112 22 106/57 98 %   02/27/18 1300 - (!) 112 28 (!) 85/44 -   02/27/18 1230 - (!) 129 (!) 34 (!) 75/61 95 %   02/27/18 1200 97 °F (36.1 °C) (!) 127 23 91/52 91 %   02/27/18 1142 - - - - 94 %   02/27/18 1130 - (!) 131 29 109/51 92 %   02/27/18 1125 - (!) 128 30 104/62 92 %   02/27/18 1120 - (!) 130 29 107/46 (!) 87 %   02/27/18 1115 - (!) 134 28 105/50 92 %   02/27/18 1110 - (!) 126 29 103/56 90 %   02/27/18 1105 - (!) 130 (!) 32 93/55 92 %   02/27/18 1100 - (!) 129 (!) 31 91/56 91 %   02/27/18 1055 - (!) 131 26 97/49 91 %   02/27/18 1050 - (!) 130 (!) 35 103/51 91 %   02/27/18 1045 - (!) 133 29 95/47 91 %   02/27/18 1040 - (!) 133 30 93/58 91 %   02/27/18 1035 - (!) 131 29 99/48 (!) 86 %   02/27/18 1030 - (!) 134 (!) 41 95/51 91 %   02/27/18 1025 - (!) 133 (!) 32 105/62 (!) 89 %   02/27/18 1020 - (!) 173 29 107/51 90 %   02/27/18 1015 - (!) 174 27 100/63 90 %   02/27/18 1010 - (!) 172 27 110/68 (!) 83 %   02/27/18 1005 - (!) 176 (!) 34 104/69 90 %   02/27/18 1000 - (!) 173 26 108/60 91 %   02/27/18 0955 - (!) 180 26 95/58 (!) 89 %   02/27/18 0952 - - - - 90 %   02/27/18 0950 - (!) 182 (!) 42 97/55 (!) 88 %   02/27/18 0945 - (!) 178 29 124/53 (!) 89 %   02/27/18 0943 - (!) 186 27 118/63 (!) 89 %   02/27/18 0940 - (!) 178 (!) 42 126/72 (!) 89 %   02/27/18 0935 - (!) 180 (!) 37 114/66 90 %   02/27/18 0930 - (!) 167 25 124/71 92 %   02/27/18 0925 - (!) 123 27 128/43 (!) 87 %   02/27/18 0920 - (!) 190 30 131/85 93 %   02/27/18 0915 - (!) 196 30 135/82 93 %   02/27/18 0914 - (!) 191 26 (!) 130/91 93 %   02/27/18 0912 - (!) 202 27 152/66 94 %   02/27/18 0900 - (!) 120 30 141/76 94 %   02/27/18 0831 98.6 °F (37 °C) (!) 126 (!) 31 131/58 99 %   02/27/18 0735 - - - - 99 %   02/27/18 0700 - (!) 113 26 131/58 (!) 82 %   02/27/18 0500 - (!) 104 22 115/61 -   02/27/18 0300 98.5 °F (36.9 °C) (!) 127 26 142/76 98 %   02/27/18 0131 - - - - 95 %   02/27/18 0100 - (!) 112 23 140/70 95 %   02/26/18 2300 98.6 °F (37 °C) (!) 108 23 121/64 -   02/26/18 2200 - (!) 122 27 - 97 %   02/26/18 2100 - (!) 128 30 143/64 92 %   02/26/18 2026 - - - - 93 %   02/26/18 2000 98.4 °F (36.9 °C) (!) 110 (!) 32 139/74 98 %       Intake/Output Summary (Last 24 hours) at 02/27/18 1902  Last data filed at 02/27/18 1800   Gross per 24 hour   Intake          2325.51 ml   Output              300 ml   Net          2025.51 ml        PHYSICAL EXAM:  General: WD, WN. Alert, cooperative, no acute distress    EENT:  EOMI. Anicteric sclerae. MMM  Resp:  CTA bilaterally, no wheezing or rales. No accessory muscle use  CV:  Regular  rhythm,  No edema  GI:  Soft, Non distended, Non tender.  +Bowel sounds  Neurologic:  Alert and oriented X 3, normal speech,   Psych:   Mildly anxious  D/t bipap mask  Skin:  No rashes.   No jaundice    Reviewed most current lab test results and cultures  YES  Reviewed most current radiology test results   YES  Review and summation of old records today    NO  Reviewed patient's current orders and MAR    YES  PMH/SH reviewed - no change compared to H&P  ________________________________________________________________________  Care Plan discussed with:    Comments   Patient y    Family      RN y    Care Manager Consultant                        Multidiciplinary team rounds were held today with , nursing, pharmacist and clinical coordinator. Patient's plan of care was discussed; medications were reviewed and discharge planning was addressed. ________________________________________________________________________  Total NON critical care TIME: 35  Minutes        Comments   >50% of visit spent in counseling and coordination of care     ________________________________________________________________________  Katelyn Dorado DO     Procedures: see electronic medical records for all procedures/Xrays and details which were not copied into this note but were reviewed prior to creation of Plan. LABS:  I reviewed today's most current labs and imaging studies. Pertinent labs include:  Recent Labs      02/27/18   0349  02/26/18   0837  02/26/18   0749  02/26/18 0135 02/25/18 0114   WBC   --    --    --   33.5*   --   44.4*   HGB  9.9*  10.1*  6.8*  8.3*   < >  9.2*   HCT  30.5*  30.6*  20.5*  25.7*   < >  29.7*   PLT   --    --    --   205   --   306    < > = values in this interval not displayed.      Recent Labs      02/26/18   0242  02/26/18 0135 02/25/18 0114   NA  147*   --   141   K  3.5   --   3.2*   CL  119*   --   110*   CO2  16*   --   21   GLU  118*   --   117*   BUN  36*   --   27*   CREA  1.17*   --   1.07*   CA  7.3*   --   7.8*   MG   --    --   1.9   ALB  1.2*   --   1.5*   TBILI  0.3   --   0.3   SGOT  20   --   20   ALT  12   --   17   INR   --   1.8*   --        Signed: Katelyn Dorado DO

## 2018-02-28 NOTE — PROGRESS NOTES
Responded to staff request to provide support to patient's family who were tearful. Met with patient's son outside patient's room. He shared that he and his sister were meeting with hospice sometime today. He expressed concern about what else he and his sister should be doing. Assured him their presence to their mother at this time is enough and that the hospice team would guide them in the process. Offered listening presence and assurance of ongoing support by chaplains as requested/desired.   BREE Merchant, [address] Paging Service  [phone])

## 2018-02-28 NOTE — PROGRESS NOTES
Care Management:    Children Raissa and Elsa Jimena at bedside. Palliative Care working with family. Family has decided on comfort care. I met with children and answered questions and offered support. Chart reviewed and we will cont to follow for discharge needs as appropriate. Hospice not yet ordered , we will cont to follow. Eugenio Granados crm ac [id number]    Addendum: [facility] consulted for assess for inpt hospice. Family in agreement. FOC placed on chart and referral sent via [employer].

## 2018-02-28 NOTE — PROGRESS NOTES
Patient has 4 chidren/surrogates who are estranged from her. [place] is oldest son who saw her mom 2 years ago. Family meeting with patient two [address] [facility] in person and Ivonne Licea over the phone , explained medical issues , they do not want to prolong her suffering , decided for comfort care. We discussed she may decompensate quickly , if she stabilize we will get hospice on board. we will place comfort care orders, leave the high flow now, and  slowly wean her off hi flow oxygen . Full progress note will follow.

## 2018-02-28 NOTE — PROGRESS NOTES
0730: Report received from Michelle Junior, [address] [facility]. Dr. Bagley Points at the bedside. 0800: Family meeting in progress. Pt. Is very confused. Pt. Responds to voice but is very drowsy and lethargic. Breath sounds are very wet, and coarse. Bowel sounds hypoactive. 0830: Pt. Is now comfort care per Patient's and family's wishes. Arabella Macdonald, patient's care giver called per patient request. Pt. Stated \" Please just  Let me go, it's my time\". 7765: Zofran, Morphine, and Scopolamine administered for comfort care. 2979: PRN Robinul given for Secretions. 1130: Dr. Bagley Points at the bedside. Pt.'s VS are stabilizing. Consulting in patient hospice per case management. 1206: PRN Ativan given for Restlessness. 1430: TRANSFER - OUT REPORT:    Verbal report given to Winter Zaman RN (name) on Patty Turk  being transferred to Oncology (unit) for routine progression of care       Report consisted of patients Situation, Background, Assessment and   Recommendations(SBAR). Information from the following report(s) SBAR, Kardex, Intake/Output, MAR and Recent Results was reviewed with the receiving nurse. Lines:   Peripheral IV 08/02/17 Right Arm (Active)       Peripheral IV 02/28/18 Right Antecubital (Active)   Site Assessment Clean, dry, & intact 2/28/2018 12:54 PM   Phlebitis Assessment 0 2/28/2018 12:54 PM   Infiltration Assessment 0 2/28/2018 12:54 PM   Dressing Status Clean, dry, & intact 2/28/2018 12:54 PM   Dressing Type Transparent;Tape 2/28/2018 12:54 PM   Hub Color/Line Status Patent; Flushed;Blue 2/28/2018 12:54 PM        Opportunity for questions and clarification was provided. Patient transported with:   Monitor  O2 @ 6 liters  Registered Nurse  Tech        [id number]: PRN morphine given for Respiratory distress and Agitation.

## 2018-02-28 NOTE — PROGRESS NOTES
Interdisciplinary team rounds were held  2/28/18 with the following team members: Care Management, Diabetes Treatment Specialist, Nursing, Nutrition, Pharmacy, Physician and Respiratory Therapy. Plan of care discussed. Goal: See MD orders and progress notes for further  interventions and desired outcomes.

## 2018-02-28 NOTE — PROGRESS NOTES
1900: Bedside and Verbal shift change report given to [id number] [facility] (oncoming nurse) by Daren Betancur RN (offgoing nurse). Report included the following information SBAR, Kardex, Procedure Summary, Intake/Output, MAR, Recent Results, Med Rec Status and Cardiac Rhythm NSR.  2000:  Assessment completed, NSR, 4Ls nasal canula, afebrile. Patient A/O to name, place and month, follows commands. Lungs with expiratory wheezing, tachypneic. Bowel sounds active. Patient voiding via purwick. PIVx3 infusing precedex gtt at 0.4 mcg/kg/hr. Bed alarm on, call bell with in reach. Will continue to monitor. 0000:  Reassessment completed, VSS, will continue to monitor. 0208: Patient HR in A fib RVR as high at 180s, cardizem gtt restarted at 5 mcg/hr due to BP, quickly titrated up to 10 mg/hr. HR holding >150. Dr. Becca Mary paged, orders received for diltiazem bolus of 20 mg IV push slowly. 0221: Precedex gtt increased to 0.6 mcg/kg/hr. 5610: Diltiazem bolus given, diltiazem gtt at 15 mg/hr, HR >150, patient with increased work of breathing and O2 sats 85-88%, lungs sound wet. Dr. Becca Mary paged, orders received for Lasix 20 mg IV, Potassium 10 mEq IV, stop diltiazem gtt, give 150 mg amio bolus and start amio gtt. 0240: Attempts to place patient on ventimask, NRB mask and BiPap mask unsuccessful.   0342: Precedex gtt increased to 0.8 mcg/kg/hr. 9629: Dr. Malinda Varela paged regarding O2 sats remaining in the low 80s, orders received for high flow oxygen> 50Ls. RT notified. 0400:  Reassessment completed, changes noted. Will continue to monitor. 4622: BP 63/44, Dr. Michael Martinez paged. Orders received for 2 amps HCO3, 1L normal saline bolus, and draw lactic acid.

## 2018-02-28 NOTE — PROGRESS NOTES
Pulmonary / Critical Care Consultation    Assessment / Plan:    End stage lung disease due to severe COPD-worsening over last 24 hrs. Acute Patchy multilobar pneumonia - in patient with known aspiration risk and underlying copd - feels better clinically but WBC elevated. Some suggestion of hematogenous spread and cavitation (may be secondary to underlying severe appearing emphysema rather than necrotizing process but agree with addition of vancomycin. Blood cultures have been negative    Acute Hypoxic Respiratory Failure, ON NC oxygen. Worsened over last 24 hrs  Possible GI or Pulmonary Source of bleeding-no overt evidence of bleeding. Leukocytosis    COPD / emphysema worse over last 2 days. Did not tolerate the bipap. Was on high flow oxygen when seen this am.     Tobacco abuse    CAD    DM    DNR and now family desire to pursue comfort care. Rec:   --Will focus on comfort meds  --Placed order to transfer to oncology. --Hospice to evaluate.   -Oxygen for comfort. --Change meds to  Comforting meds. -- Appreciate   The consultants help, palliative care, and IM. History / Subjective, last 24 hrs:     2-28-18: Pt was very ill yesterday and remains critically ill, has increased work of breathing, yesterday was having increased heart rates into the 200s. Had increased work of breathing, Was on and off the bipap. Moderate to high level of agitation. When seen this am at 830 she was on high flow oxygen. Still dyspnea at rest with moderate wheezing. She denied any chest pain, back pain, or leg pain. NO GERD. Her family agree with pursuing comfort care. Discussed with nurse this am.     Candelario Sherman is a 74 y.o.  female who  has a past medical history of Acute MI; Benzodiazepine abuse; CAD (coronary artery disease); COPD; Diabetes ([facility] [address] Emphysema; Essential hypertension; Gastrointestinal disorder; Hyperkalemia; Hyperlipemia; and Hypertension. admitted 2/15/2018 with pneumonia.   Pt found to have aspiration risk and has been on abx. She clinically says she feels much better and is afebrile. However, her WBC is rising and her CXR has shown increase in primarily upper lobe asdz. Denies change in sputum. No fevers or chills. No chest pain. No hemoptysis    Allergies   Allergen Reactions    Iodinated Contrast- Oral And Iv Dye Anaphylaxis    Penicillin G Hives    Ciprofloxacin Unable to Obtain    Lyrica [Pregabalin] Other (comments)     lossing balance     Past Medical History:   Diagnosis Date    Acute MI     Benzodiazepine abuse     CAD (coronary artery disease)     COPD     Diabetes ([facility] [address]     Emphysema     Essential hypertension     Gastrointestinal disorder     ulcer    Hyperkalemia     Hyperlipemia     Hypertension       Past Surgical History:   Procedure Laterality Date    CORONARY STENT SINGLE W/PTCA      HX CORONARY STENT PLACEMENT      HX HEART CATHETERIZATION        Prior to Admission medications    Medication Sig Start Date End Date Taking? Authorizing Provider   metoprolol tartrate (LOPRESSOR) 25 mg tablet Take 1 Tab by mouth two (2) times a day. 2/17/18  Yes Justin Ratliff MD   aspirin 81 mg chewable tablet Take 1 Tab by mouth daily. 2/18/18  Yes Justin Ratliff MD   predniSONE (DELTASONE) 10 mg tablet Take 4 tabs daily for 3 days, then take 3 tabs daily for 3 days, then take 2 tabs daily for 3 days, then take 1 tab daily for 3 days, then stop 2/17/18  Yes Justin Ratliff MD   albuterol (PROVENTIL HFA, VENTOLIN HFA, PROAIR HFA) 90 mcg/actuation inhaler Take 2 Puffs by inhalation every four (4) hours as needed for Shortness of Breath. 2/17/18  Yes Justin Ratliff MD   oxyCODONE IR (ROXICODONE) 10 mg tab immediate release tablet Take 10 mg by mouth every four (4) hours as needed for Pain.    Yes Historical Provider   pravastatin (PRAVACHOL) 80 mg tablet TAKE 1 TABLET BY MOUTH ONCE DAILY 7/6/15  Yes RAMON Hicks   ASPIRIN/ACETAMINOPHEN/CAFFEINE (EXCEDRIN EXTRA STRENGTH PO) Take 1 Tab by mouth daily as needed for Pain. Yes Historical Provider   clonazepam (KLONOPIN) 0.5 mg tablet Take 0.5 mg by mouth four (4) times daily. 11   Sedrick Hayes MD      No family history on file. Social History   Substance Use Topics    Smoking status: Current Every Day Smoker     Packs/day: 0.50     Years: 0.00     Types: Cigarettes    Smokeless tobacco: Never Used    Alcohol use No      ROS:  A comprehensive review of systems was negative except for that written in the HPI. Objective:  Patient Vitals for the past 4 hrs:   BP Temp Pulse Resp SpO2   18 1300 (!) 88/54 - 89 17 90 %   18 1254 - - 93 22 -   18 1200 120/59 97.8 °F (36.6 °C) 95 25 96 %   18 1152 - - - - 93 %   18 1130 124/58 - 96 25 91 %   18 1100 99/51 - 76 13 -     Temp (24hrs), Av.7 °F (36.5 °C), Min:97.4 °F (36.3 °C), Max:97.8 °F (36.6 °C)    CVP:          Intake/Output Summary (Last 24 hours) at 18 1409  Last data filed at 18 1130   Gross per 24 hour   Intake          3746.16 ml   Output              400 ml   Net          3346.16 ml     Blood Sugar:  Glucose   Date Value Ref Range Status   2018 125 (H) 65 - 100 mg/dL Final   2018 118 (H) 65 - 100 mg/dL Final   2018 117 (H) 65 - 100 mg/dL Final   2018 89 65 - 100 mg/dL Final   2018 86 65 - 100 mg/dL Final     Exam:  Thin wf in NAD, on high flow oxygen. Alert  MM dry  Anicteric  Trachea midline  Accessory use  Ongoing Increased work of breathing this am. Has bilateral wheezing, prolonged expiratory phase. .   Kyphosis  Distant bs bilaterally  RRR, tachycardic nl s1,2. Soft / NT / + BS  Warm and dry  Trace edema  No cyanosis or clubbing  Neuro: nonfocal, moving all extremities  Psych: Seems to have some anxiety. Lab data was reviewed. Radiology images were independently viewed and available reports were reviewed.     CXR:  Flat diaphragms, bilateral asdz with some suggestion of cavitation    CT - emphysema with patchy bilateral asdz    Lab:  Recent Labs      02/28/18   0621  02/28/18   0311  02/27/18   1944  02/27/18   0349   02/26/18   0242  02/26/18   0135   WBC   --   46.7*   --    --    --    --   33.5*   HGB   --   9.8*  8.9*  9.9*   < >   --   8.3*   PLT   --   286   --    --    --    --   205   NA   --   142   --    --    --   147*   --    K   --   3.9   --    --    --   3.5   --    CL   --   117*   --    --    --   119*   --    CO2   --   10*   --    --    --   16*   --    BUN   --   50*   --    --    --   36*   --    CREA   --   1.71*   --    --    --   1.17*   --    GLU   --   125*   --    --    --   118*   --    CA   --   7.4*   --    --    --   7.3*   --    MG   --   2.1   --    --    --    --    --    INR   --    --    --    --    --    --   1.8*   TBILI   --    --    --    --    --   0.3   --    SGOT   --    --    --    --    --   20   --    LAC  1.4   --    --    --    --    --   1.6    < > = values in this interval not displayed.      All Micro Results     Procedure Component Value Units Date/Time    CULTURE, SPUTUM/BRONCH/OTH [[id number]]  (Abnormal) Collected:  02/25/18 1830    Order Status:  Completed Specimen:  Sputum from Bronchial Washing Updated:  02/28/18 1154     Special Requests: NO SPECIAL REQUESTS        GRAM STAIN 2+ WBCS SEEN                 RARE EPITHELIAL CELLS SEEN      FEW BUDDING YEAST        Culture result:         MODERATE YEAST ISOLATED (A)            FEW  FILAMENTOUS FUNGUS  SENDING TO THE STATE LAB FOR ID   (A)              LIGHT NORMAL RESPIRATORY FLORENTINO    CULTURE, BLOOD, PAIRED [[id number]] Collected:  02/24/18 0808    Order Status:  Completed Specimen:  Blood Updated:  02/28/18 0750     Special Requests: NO SPECIAL REQUESTS        Culture result: NO GROWTH 4 DAYS       AFB CULTURE + SMEAR W/RFLX ID FROM CULTURE [[id number]] Collected:  02/25/18 1830    Order Status:  Completed Specimen:  Miscellaneous sample Updated:  02/27/18 0005     Source BRONCHIAL WASHING        AFB Specimen processing Concentration     Acid Fast Smear NEGATIVE          (NOTE)  Performed At: [address] [id number]  Gogo Boyce MD MP:[id number]          Acid Fast Culture PENDING    [place] Jes [[id number]] Collected:  02/25/18 1830    Order Status:  Completed Specimen:  Bronchial Washing Updated:  02/25/18 2233    CULTURE, MRSA [[id number]] Collected:  02/24/18 1239    Order Status:  Completed Specimen:  Nares Updated:  02/25/18 2011     Special Requests: NO SPECIAL REQUESTS        Culture result: MRSA NOT PRESENT                     Screening of patient nares for MRSA is for surveillance purposes and, if positive, to facilitate isolation considerations in high risk settings. It is not intended for automatic decolonization interventions per se as regimens are not sufficiently effective to warrant routine use.     CULTURE, STOOL [[id number]] Collected:  02/23/18 1156    Order Status:  Completed Specimen:  Stool Updated:  02/25/18 1054     Special Requests: NO SPECIAL REQUESTS        Campylobacter antigen NEGATIVE        Shiga toxin-producing E. coli Ag NO GROWTH        Culture result:         NO ROUTINE ENTERIC PATHOGENS ISOLATED INCLUDING SALMONELLA, SHIGELLA, YERSINIA, VIBRIO OR E. COLI 0157:H7      NO COLIFORMS ISOLATED                 HEAVY MIXED GRAM POSITIVE FLORENTINO ISOLATED , ONLY    OVA & PARASITES, STOOL [[id number]] Collected:  02/25/18 1000    Order Status:  Completed Specimen:  Stool from Stool Updated:  02/25/18 1008    CULTURE, RESPIRATORY/SPUTUM/BRONCH [place] STAIN [[id number]] Collected:  02/24/18 1530    Order Status:  Canceled Specimen:  Sputum from Sputum     OVA & PARASITES, STOOL [[id number]]     Order Status:  Canceled Specimen:  Stool from Stool     C. DIFFICILE (DNA) [[id number]]     Order Status:  Canceled     CULTURE, RESPIRATORY/SPUTUM/BRONCH Nancy Luis [[id number]] Collected:  02/21/18 1500    Order Status: Canceled Specimen:  Sputum from Sputum     INFLUENZA A & B AG (RAPID TEST) [[id number]]     Order Status:  Canceled Specimen:  Nasopharyngeal from Nasal washing     CULTURE, BLOOD, PAIRED [[id number]] Collected:  02/15/18 1132    Order Status:  Completed Specimen:  Blood Updated:  02/20/18 0803     Special Requests: NO SPECIAL REQUESTS        Culture result: NO GROWTH 5 DAYS       CULTURE, RESPIRATORY/SPUTUM/BRONCH [place] [[id number]] Collected:  02/18/18 1415    Order Status:  Canceled Specimen:  Sputum from Sputum     CULTURE, MRSA [[id number]]     Order Status:  Canceled Specimen:  Mk Patiño MD

## 2018-02-28 NOTE — PROGRESS NOTES
CM noted new patient received to the unit. Chart reviewed. Consult for hospice information and evaluation - referral has been sent to [facility].     Kurtis Chase RN, BSN, AC   - Medical Oncology  [phone]

## 2018-02-28 NOTE — HOSPICE
[facility] RN note:  Consult noted. Reviewing chart. No family present at bedside. Pt appears comfortable, developing upper airway secretions, did not respond to voice or touch. Comfort medications available per palliative. Son Matti Gonsales able to meet tomorrow between 11:00 and 11:30 if pt does not die before. Son expressing tremendous gratitude to all involved in pt care. Thank you for the opportunity to care for this pt and family. Please contact hospice at [phone] with any questions or concerns.

## 2018-02-28 NOTE — PROGRESS NOTES
Primary Nurse Andrei Bowling RN and DOUG metz performed a dual skin assessment on this patient No impairment noted  Sal score is 20. Buttock red and  blanchable. Rash noted on buttock. .Bruising noted on arms and legs.

## 2018-03-01 PROBLEM — J44.9 COPD (CHRONIC OBSTRUCTIVE PULMONARY DISEASE) (HCC): Status: ACTIVE | Noted: 2018-01-01

## 2018-03-01 NOTE — H&P
[facility]  [phone]    Patient Name: Juany Meza  YOB: 1945    Date of Provider Hospice Visit: 03/01/18    Level of Care:   [x] General Inpatient (GIP)    [] Routine   [] Respite    Location of Care:  [] [facility] [] [place] [facility] [x] [facility] [] [facility] [] Kianna Hubbard [address] [place] [address]    Date of [address] [address] Admission: 3-1-18  Hospice Medical Director at time of admission: Dr. Fang Sharma Diagnosis: End stage COPD  Diagnoses RELATED to the terminal prognosis: sepsis due to aspiration pneumonia, hypoxic respiratory failure, Acute kidney failure  Other Diagnoses: PAF,CAD     HOSPICE SUMMARY   Do not cut and paste chart information other than imaging findings    Juany Meza is a 74y.o. year old who was admitted to [facility]. Pt was admitted to hospital 2-15-18 for copd exacerbation. PT became septic from multilobar pneumonia probable aspiration. S/p bronch--yeast.  Had PAF and SVT. Managed with iv abx, 02 and bipap. No improvement and pt has become nonresponsive. Pt still very hypotensive and hypoxic , WBC 46K. Requiring frequent iv moprhine and ativan to manage dyspnea and restlessness . Family made decision for comfort care only and pt is admitted to inpatient hospice today  The patient's principle diagnosis has resulted in respiratory failure, severe hypotension and AMS. Refer to LCD     Functionally, the patient's Karnofsky and/or Palliative Performance Scale has declined over a period of weeks and is estimated at 10. The patient is dependent on the following ADLs:all    Objective information that support this patients limited prognosis includes:   Chest CT 2-24-18    IMPRESSION  IMPRESSION:    1. Multilobar pneumonia. Recommend follow-up to ensure resolution. Wbc 46k on 2-28-18  Bun/cr 60/1.7  bp 73/44 on 3-1-18 , 02 sat 87% on NC and nonresponsive    The patient/family chose comfort measures with the support of Hospice. HOSPICE DIAGNOSES   Active Symptoms:  1. Labored breathing  2. Excess secretions     PLAN   1. Admit to hospice at [facility] . Pt is imminently dying and unstable to transfer out of hospital  2. Continue nasal 02 for comfort  3. Iv morphine and aiv ativan q4hrs and prn as ordered to manage dyspnea and restlessness  4. Iv robinul q4 hrs for secretions    5.  and SW to support family needs  6. Disposition: to home with hospice but unlikely as appears imminently dying    Prognosis estimated based on 03/01/18 clinical assessment is:   [x] Hours to Days    [] Days to Weeks    [] Other:    Communicated plan of care with: Hospice Case Manager;  Hospice IDT; Care Team     GOALS OF CARE     Resuscitation Status: DNR  Durable DNR: [] Yes [] No    Advance Care Planning 2/16/2018   Patient's Healthcare Decision Maker is: Legal Next of Dale 67   Primary Decision Maker Name Terry Brower   Primary Decision Maker Phone Number [phone]   Primary Decision Maker Relationship to Patient Adult child   Confirm Advance Directive None   Patient Would Like to Complete Advance Directive No   Does the patient have other document types Do Not Resuscitate        HISTORY     History obtained from: chart and hospice RN    CHIEF COMPLAINT:   The patient is:   [] Verbal  [x] Nonverbal  [x] Unresponsive    HPI/SUBJECTIVE:   nonresponsive and hypotensive today  bipap removed       REVIEW OF SYSTEMS     The following systems were: [] reviewed  [x] unable to be reviewed    Positive ROS include:  Constitutional: fatigue, weakness, in pain, short of breath  Ears/nose/mouth/throat: increased airway secretions  Respiratory:shortness of breath, wheezing  Gastrointestinal:poor appetite, nausea, vomiting, abdominal pain, constipation, diarrhea  Musculoskeletal:pain, deformities, swelling legs  Neurologic:confusion, hallucinations, weakness  Psychiatric:anxiety, feeling depressed, poor sleep  Endocrine:     Adult Non-Verbal Pain Assessment Score:3    Face  [x] 0   No particular expression or smile  [] 1   Occasional grimace, tearing, frowning, wrinkled forehead  [] 2   Frequent grimace, tearing, frowning, wrinkled forehead    Activity (movement)  [x] 0   Lying quietly, normal position  [] 1   Seeking attention through movement or slow, cautious movement  [] 2   Restless, excessive activity and/or withdrawal reflexes    Guarding  [x] 0   Lying quietly, no positioning of hands over areas of body  [] 1   Splinting areas of the body, tense  [] 2   Rigid, stiff    Physiology (vital signs)  [] 0   Stable vital signs  [] 1   Change in any of the following: SBP > 20mm Hg; HR > 20/minute  [x] 2   Change in any of the following: SBP > 30mm Hg; HR > 25/minute    Respiratory  [] 0   Baseline RR/SpO2, compliant with ventilator  [x] 1   RR > 10 above baseline, or 5% drop SpO2, mild asynchrony with ventilator  [] 2   RR > 20 above baseline, or 10% drop SpO2, asynchrony with ventilator     FUNCTIONAL ASSESSMENT     Palliative Performance Scale (PPS):10     PSYCHOSOCIAL/SPIRITUAL ASSESSMENT     Active Problems:    COPD (chronic obstructive pulmonary disease) ([facility]) (3/1/2018)      Past Medical History:   Diagnosis Date    Acute MI     Benzodiazepine abuse     CAD (coronary artery disease)     COPD     Diabetes ([facility] [address]     Emphysema     Essential hypertension     Gastrointestinal disorder     ulcer    Hyperkalemia     Hyperlipemia     Hypertension       Past Surgical History:   Procedure Laterality Date    CORONARY STENT SINGLE W/PTCA      HX CORONARY STENT PLACEMENT      HX HEART CATHETERIZATION        Social History   Substance Use Topics    Smoking status: Current Every Day Smoker     Packs/day: 0.50     Years: 0.00     Types: Cigarettes    Smokeless tobacco: Never Used    Alcohol use No     No family history on file.    Allergies   Allergen Reactions    Iodinated Contrast- Oral And Iv Dye Anaphylaxis    Penicillin G Hives    Ciprofloxacin Unable to Obtain    Lyrica [Pregabalin] Other (comments)     lossing balance      Current Facility-Administered Medications   Medication Dose Route Frequency    LORazepam (ATIVAN) injection 1 mg  1 mg IntraVENous Q15MIN PRN    acetaminophen (TYLENOL) suppository 650 mg  650 mg Rectal Q4H PRN    glycopyrrolate (ROBINUL) injection 0.2 mg  0.2 mg IntraVENous Q4H    scopolamine (TRANSDERM-SCOP) 1 mg over 3 days 1 Patch  1 Patch TransDERmal Q72H    bisacodyl (DULCOLAX) suppository 10 mg  10 mg Rectal DAILY PRN    morphine 2 mg  2 mg IntraVENous Q15MIN PRN    morphine 2 mg  2 mg IntraVENous Q4H    LORazepam (ATIVAN) injection 1 mg  1 mg IntraVENous Q4H    saline peripheral flush soln 5 mL  5 mL InterCATHeter PRN        PHYSICAL EXAM     Wt Readings from Last 3 Encounters:   02/21/18 97 lb 12.8 oz (44.4 kg)   08/02/17 105 lb (47.6 kg)   05/10/17 104 lb 8 oz (47.4 kg)       Visit Vitals    BP (!) 73/44    Pulse (!) 52    Temp 97.4 °F (36.3 °C)    Resp 16    SpO2 (!) 87%       Supplemental O2  [x] Yes  [] NO  Last bowel movement:     Currently this patient has:  [x] Peripheral IV [] PICC  [] PORT [] ICD    [] Maldonado Catheter [] NG Tube   [] PEG Tube    [] Rectal Tube [] Drain  [] Other:     Constitutional:nonresponsive to noxious stimuli, eyes remain closed  Eyes: perrl  ENMT: clear  Cardiovascular: rrr  Respiratory: labroed with prolonged expiratory phase  Gastrointestinal: soft  Musculoskeletal:1 plus ankle pedal edema  Skin:intact  Neurologic:noresponsive  Psychiatric:   Other:       Pertinent Lab and or Imaging Tests:  Lab Results   Component Value Date/Time    Sodium 142 02/28/2018 03:11 AM    Potassium 3.9 02/28/2018 03:11 AM    Chloride 117 (H) 02/28/2018 03:11 AM    CO2 10 (LL) 02/28/2018 03:11 AM    Anion gap 15 02/28/2018 03:11 AM    Glucose 125 (H) 02/28/2018 03:11 AM    BUN 50 (H) 02/28/2018 03:11 AM    Creatinine 1.71 (H) 02/28/2018 03:11 AM    BUN/Creatinine ratio 29 (H) 02/28/2018 03:11 AM    GFR est AA 35 (L) 02/28/2018 03:11 AM    GFR est non-AA 29 (L) 02/28/2018 03:11 AM    Calcium 7.4 (L) 02/28/2018 03:11 AM     Lab Results   Component Value Date/Time    Protein, total 5.0 (L) 02/26/2018 02:42 AM    Albumin 1.2 (L) 02/26/2018 02:42 AM           Total time: 50 min  Counseling / coordination time: d/w hospice RN, family were not present  > 50% counseling / coordination?: n

## 2018-03-01 NOTE — HOSPICE
[address] [facility] to Those in Need  [phone]    Inpatient Nursing Admission   Patient Name: Candelario Sherman  YOB: 1945  Age: 74 y.o. Date of Hospice Admission: 3/1/2018  Hospice Attending Elected by Patient: Nathaly Andino MD  Primary Care Physician: Juan Lopez MD  Admitting RN: ***  : ***     Level of Care (GIP/Routine/Respite): ***  Facility of Care: ***  Patient Room: [address] [place] [address] Dr   ER Visits/ Hospitalizations in past year: ***  Hospice Diagnosis: copd  COPD (chronic obstructive pulmonary disease) ([facility] [address]  Onset Date of Hospice Diagnosis: ***  Summary of Disease Progression Leading to Hospice Diagnosis: ***    Co-Morbidities:   Patient Active Problem List   Diagnosis Code    Anxiety and depression F41.8    Postsurgical percutaneous transluminal coronary angioplasty status Z98.61    Tobacco use disorder F17.200    Old myocardial infarction I25.2    Essential hypertension, benign I10    Coronary atherosclerosis of native coronary artery I25.10    Mixed hyperlipidemia E78.2    Acute myocardial infarction of other inferior wall, subsequent episode of care I21.19    Multiple bruises T07. Toni Arts    Chronic ischemic heart disease I25.9    COPD with emphysema/chronic bronchitis with intermittent exacerbations J43.9    S/P angioplasty with stent--ISR RCA PCI Z95.9    Iatrogenic hypotension I95.89    Acute exacerbation of chronic obstructive pulmonary disease (COPD) ([facility]) J44.1    Benzodiazepine abuse F13.10    Acute MI I21.9    COPD (chronic obstructive pulmonary disease) ([facility]) J44.9     Diagnoses RELATED to the terminal prognosis: ***  Other Diagnoses: ***    Rationale for a prognosis of life expectancy of 6 months or less if the disease follows its normal course (Disease Specific History):     Candelario Sherman is a 74 y. o. who was admitted to [facility]. The patient's principle diagnosis of *** has resulted in ***. Functionally, the patient's Palliative Performance Scale has declined over a period of *** and is estimated at ***. Objective information that support this patients limited prognosis includes: ***. The patient/family chose comfort measures with the support of Hospice. Patient meets for *** LOC as evidenced by ***    Prognosis estimated based on 03/01/18 clinical assessment is:   [] Few to Many Hours  [] Hours to Days   [] Few to Many Days   [] Days to Weeks   [] Few to Many Weeks   [] Weeks to Months   [] Few to Many Months    ASSESSMENT    Patient self-reports:  []  Yes    [] No    SYMPTOMS: ***    SIGNS/PHYSICAL FINDINGS: ***    KARNOFSKY: ***    FAST for all dementia:      Learning Assessment:  Patient  Is patient willing/able to learn? What is the highest level of education completed? Learning preference (written material, demonstration, visual)? Learning barriers (ESOL, [employer] [place] [employer] [place], poor vision)? Caregiver  Is caregiver willing to learn care for patient? What is the highest level of education completed? Learning preference (written material, demonstration, visual)? Learning barriers (ESOL, [employer] [place] [employer] [place], poor vision)? CLINICAL INFORMATION     Wt Readings from Last 3 Encounters:   02/21/18 44.4 kg (97 lb 12.8 oz)   08/02/17 47.6 kg (105 lb)   05/10/17 47.4 kg (104 lb 8 oz)     Ht Readings from Last 3 Encounters:   02/15/18 5' 2\" (1.575 m)   08/02/17 5' (1.524 m)   05/10/17 5' (1.524 m)     There is no height or weight on file to calculate BMI. There were no vitals taken for this visit. LAB VALUES  No results found for this visit on 03/01/18 (from the past 12 hour(s)). No results found for this visit on 03/01/18 (from the past 6 hour(s)).   Lab Results   Component Value Date/Time    Protein, total 5.0 (L) 02/26/2018 02:42 AM    Albumin 1.2 (L) 02/26/2018 02:42 AM       Currently this patient has:  [] Supplemental O2 [] Peripheral IV  [] PICC    [] PORT   [] Maldonado Catheter [] NG Tube   [] PEG Tube [] Ostomy [] AICD: Has ICD been deactivated? [] Yes [] No:______    PLAN     1. ***    Hospice Team Frequency Orders:  Skilled Nurse -  *** Daily x 7 days /every other day x 7 days *** with 5 PRN visits for symptom control. MSW  1 visit for initial assessment/evaluation for family support and need for volunteer services. [employer]  1 visit for initial assessment/evaluation for spiritual support. ADVANCE CARE PLANNING (Complete in ACP Flow Sheet)   Code Status: ***  Durable DNR: []  Yes  []  No  Code Status Discussed/Confirmed:  Preference for Other Life Sustaining Treatment Discussed/Confirmed:  Hospitalization Preference:  (ex. Patient would like to receive end of life care in the hospital, in a facility, at home etc.)  Advance Care Planning 2018   Patient's Healthcare Decision Maker is: Legal Next of Kin   Primary Decision Maker Name Sahil Kaur   Primary Decision Maker Phone Number [phone]   Primary Decision Maker Relationship to Patient Adult child   Confirm Advance Directive None   Patient Would Like to Complete Advance Directive No   Does the patient have other document types Do Not Resuscitate        Service: [] Yes  []  No      [] Unknown  Appropriate for Pinning Ceremony:  [] Yes     [] No  Buddhism: Druze   Home: ***    DISCHARGE PLANNING     1. Discharge Plan: ***  2. Patient/Family teaching: ***  3. Response to patient/family teaching: ***    SOCIAL/EMOTIONAL/SPIRITUAL NEEDS     Spiritual Issues Identified: ***    Psych/ Social/ Emotional Issues Identified: ***    Caregiver Support:  [] Provided information on End of Life Care   [] Material Provided: Gone From My Sight or Valinda Kocher Dr. Joanna Borer contacted, discharge to hospice order received  Dr. Laura Espinoza contacted, agrees to serve as attending provider for hospice and provided verbal certification of terminal illness with life expectancy of 6 months or less.  Orders for hospice admission, medications and plan of treatment received. Medication reconciliation completed. MEDS: See medication list below  DME: Per hospital  Supplies: Per hospital  IDT communication to include MD, SN, SW, CH and support team    ALLERGIES AND MEDICATIONS     Allergies:    Allergies   Allergen Reactions    Iodinated Contrast- Oral And Iv Dye Anaphylaxis    Penicillin G Hives    Ciprofloxacin Unable to Obtain    Lyrica [Pregabalin] Other (comments)     lossing balance     Current Facility-Administered Medications   Medication Dose Route Frequency    LORazepam (ATIVAN) injection 1 mg  1 mg IntraVENous Q15MIN PRN    acetaminophen (TYLENOL) suppository 650 mg  650 mg Rectal Q4H PRN    glycopyrrolate (ROBINUL) injection 0.2 mg  0.2 mg IntraVENous Q4H    scopolamine (TRANSDERM-SCOP) 1 mg over 3 days 1 Patch  1 Patch TransDERmal Q72H    bisacodyl (DULCOLAX) suppository 10 mg  10 mg Rectal DAILY PRN    morphine injection 2 mg  2 mg IntraVENous Q15MIN PRN    morphine injection 2 mg  2 mg IntraVENous Q4H    LORazepam (ATIVAN) injection 1 mg  1 mg IntraVENous Q4H    saline peripheral flush soln 5 mL  5 mL InterCATHeter PRN

## 2018-03-01 NOTE — DISCHARGE SUMMARY
Hospitalist Discharge Summary     Patient ID:  Rita Ibarra  [id number]  50 y.o.  1945    PCP on record: Rina Bruce MD    Admit date: 3/1/2018  Discharge date and time: 3/1/2018      DISCHARGE DIAGNOSIS:  1. COPD exacerbation  2. Acute normocytic anemia  3. Sepsis 2/2 aspiration PNA  4. Hx of tobacco use  5. Poxy AF  6. CAD  7. HTN  8. HLD      CONSULTATIONS:  None    Excerpted HPI from H&P of Umm Schultz MD  This is a 40-year-old female with past medical history of coronary artery disease, hypertension, COPD comes to the hospital with chief complaints of shortness of breath since the last 3 weeks.  She reports shortness of breath is mostly exertional even with minimal activity and without any orthopnea or paroxysmal nocturnal dyspnea.  She reports cough with initially clear phlegm but now it is brownish in color.  She does not report any chest pain, palpitations or syncopal episodes.  She also reports recent rhinorrhea along with some headache.  She reports that she had extensive history of smoking but has not smoked the last 2 months.  She does not report any fever or chills.  She denies exposure to sick contacts. Ama Hernandez denies long-distance travel. Ama Hernandez denies nausea, vomiting, diarrhea or abdominal pain. On arrival to the hospital, she was noted to have significant wheezing and was also tachycardic and was using accessory muscles.  She did desaturate to 89% upon ambulation on room air.  She was given Solu-Medrol, magnesium sulfate and albuterol breathing treatments.     We were asked to admit for work up and evaluation of the above problems. ______________________________________________________________________  DISCHARGE SUMMARY/HOSPITAL COURSE:  for full details see H&P, daily progress notes, labs, consult notes. Pt admitted with 3 weeks of worsening SOB and increased work of breathing with known end-stage COPD treated for exacerbation of the same.  During the course of admission pt became septic with presumed aspiration and was treated for aspiration PNA requiring NIPPV. Patient also experienced diarrhea, with negative stool studies. Pt also underwent bronchoscopy with moderate yeast pending speciation which were sent to the state lab. During a course in the ICU, patient experienced SVTs with rate in the 200s and labile pressures after being treated with cardizem. A family discussion with oldest son who is next of kin and other children. All parties in agreement that they would not want to prolong the patient's suffering and decision was made to change goals of care to comfort measures only. Patient has been accepted for admission to inpatient hospice and is being discharged to the same. _______________________________________________________________________  Patient seen and examined by me on discharge day. Pertinent Findings:  Gen:    Not in distress  Chest: Diminished air movement globally   CVS:   Regular rhythm. No edema  Abd:  Soft, not distended, not tender  Neuro:  Patient somnolent, difficult to arouse  _______________________________________________________________________  DISCHARGE MEDICATIONS:   Current Discharge Medication List          My Recommended Diet, Activity, Wound Care, and follow-up labs are listed in the patient's Discharge Insturctions which I have personally completed and reviewed.     ______________________________________________________________________    Risk of deterioration: High    Condition at Discharge:  Stable  ______________________________________________________________________    Disposition  IP Hospice  ______________________________________________________________________    Care Plan discussed with:   Patient, Family, RN, Care Manager, Consultant    ______________________________________________________________________    Code Status: DNR/DNI  ______________________________________________________________________      Follow up with:   PCP : Leonor Gallagher MD  Follow-up Information     None              Total time in minutes spent coordinating this discharge (includes going over instructions, follow-up, prescriptions, and preparing report for sign off to her PCP) : 30 minutes    Signed:  Mian Fuentes DO

## 2018-03-01 NOTE — PROGRESS NOTES
Brief Nutrition Note    Chart reviewed. Family choosing comfort care. No nutrition intervention at this time. Please consult if nutrition needs arise.     Shayan Waller RDN  Pager: [phone]

## 2018-03-01 NOTE — PROGRESS NOTES
Hospitalist Progress Note    NAME: Armida Romero   :  1945   MRN:  [id number]       Assessment / Plan:  Hemoptysis  SVTs/tachycardia  Acute normocytic anemia  Acute COPD exacerbation due to pneumonia  Sepsis   Persistant leukocytosis  Aspiration PNA:   Hx of Tobacco use  Elevated troponin upon admission  Paroxysmal atrial fibrillationCAD, HTN, HLD      Family meeting today with palliative care with next of kin and multiple family members present. After the discussion with medical issues explained by palliative team, decision was made to change goals of care to comfort care only with plan to gradually wean patient from high flow NC O2    Medications for pain/agitation ordered    Patient transferred to medical floor     Subjective:     Chief Complaint / Reason for Physician Visit  COPD exacerbation, end-stage. Comfort care as goal of care as above, patient was resting peacefully in bed with no signs of agitation or pain    Review of Systems:  Symptom Y/N Comments  Symptom Y/N Comments   Fever/Chills    Chest Pain     Poor Appetite    Edema     Cough    Abdominal Pain     Sputum    Joint Pain     SOB/HANDLEY    Pruritis/Rash     Nausea/vomit    Tolerating PT/OT     Diarrhea    Tolerating Diet     Constipation    Other       Could NOT obtain due to: Patient  Resting pleacefully     Objective:     VITALS:   Last 24hrs VS reviewed since prior progress note.  Most recent are:  Patient Vitals for the past 24 hrs:   Temp Pulse Resp BP SpO2   18 1520 95.9 °F (35.5 °C) 99 20 106/61 94 %   18 1513 - - - - 94 %   18 1400 - 85 20 97/56 90 %   18 1300 - 89 17 (!) 88/54 90 %   18 1254 - 93 22 - -   18 1200 97.8 °F (36.6 °C) 95 25 120/59 96 %   18 1152 - - - - 93 %   18 1130 - 96 25 124/58 91 %   18 1100 - 76 13 99/51 -   18 1000 - 93 (!) 33 (!) 88/45 -   18 0900 - 86 22 112/64 96 %   18 0818 - - - - 96 %   18 0800 97.8 °F (36.6 °C) 87 (!) 37 (!) 76/52 96 %   02/28/18 0700 - 84 19 (!) 75/25 100 %   02/28/18 0630 - 88 18 (!) 77/27 100 %   02/28/18 0600 - 81 20 (!) 62/45 100 %   02/28/18 0500 - (!) 119 25 91/49 96 %   02/28/18 0400 97.7 °F (36.5 °C) (!) 122 26 95/55 (!) 79 %   02/28/18 0345 - (!) 120 24 91/65 (!) 81 %   02/28/18 0330 - (!) 135 23 (!) 86/47 (!) 80 %   02/28/18 0315 - (!) 148 23 90/53 (!) 83 %   02/28/18 0300 - (!) 155 (!) 37 95/45 (!) 85 %   02/28/18 0200 - (!) 156 25 136/86 90 %   02/28/18 0100 - 100 20 156/79 90 %   02/28/18 0000 97.6 °F (36.4 °C) 88 24 145/62 93 %   02/27/18 2300 - 69 26 103/55 98 %   02/27/18 2200 - 69 18 90/50 99 %   02/27/18 2100 - 69 16 (!) 87/48 96 %   02/27/18 2000 97.4 °F (36.3 °C) 74 (!) 32 101/49 96 %   02/27/18 1930 - 73 (!) 31 94/54 95 %       Intake/Output Summary (Last 24 hours) at 02/28/18 1919  Last data filed at 02/28/18 1130   Gross per 24 hour   Intake          3046.98 ml   Output              300 ml   Net          2746.98 ml        PHYSICAL EXAM:  General: Resting peacefully,no apparent distress  Resp:  CTA bilaterally, no wheezing or rales. No accessory muscle use  CV:  Regular  rhythm,  No edema  Skin:  No rashes. No jaundice    Reviewed most current lab test results and cultures  YES  Reviewed most current radiology test results   YES  Review and summation of old records today    NO  Reviewed patient's current orders and MAR    YES  PMH/SH reviewed - no change compared to H&P  ________________________________________________________________________  Care Plan discussed with:    Comments   Patient     Family      RN y    Care Manager     Consultant                        Multidiciplinary team rounds were held today with , nursing, pharmacist and clinical coordinator. Patient's plan of care was discussed; medications were reviewed and discharge planning was addressed.      ________________________________________________________________________  Total NON critical care TIME: 30 Minutes    Total CRITICAL CARE TIME Spent:   Minutes non procedure based      Comments   >50% of visit spent in counseling and coordination of care     ________________________________________________________________________  Anni Grimaldo DO     Procedures: see electronic medical records for all procedures/Xrays and details which were not copied into this note but were reviewed prior to creation of Plan. LABS:  I reviewed today's most current labs and imaging studies. Pertinent labs include:  Recent Labs      02/28/18 0311 02/27/18 1944 02/27/18 0349 02/26/18 0135   WBC  46.7*   --    --    --   33.5*   HGB  9.8*  8.9*  9.9*   < >  8.3*   HCT  30.7*  27.2*  30.5*   < >  25.7*   PLT  286   --    --    --   205    < > = values in this interval not displayed.      Recent Labs      02/28/18 0311 02/26/18   0242 02/26/18 0135   NA  142  147*   --    K  3.9  3.5   --    CL  117*  119*   --    CO2  10*  16*   --    GLU  125*  118*   --    BUN  50*  36*   --    CREA  1.71*  1.17*   --    CA  7.4*  7.3*   --    MG  2.1   --    --    ALB   --   1.2*   --    TBILI   --   0.3   --    SGOT   --   20   --    ALT   --   12   --    INR   --    --   1.8*       Signed: Anni Grimaldo DO

## 2018-03-01 NOTE — HOSPICE
[address] [facility] Help to Those in Need  [phone]    Inpatient Nursing Admission   Patient Name: Raymond Lehman  YOB: 1945  Age: 74 y.o. Date of Hospice Admission: 3/1/2018  Hospice Attending Elected by Patient: Minesh Matthew MD  Primary Care Physician: Arlene Walls MD  Admitting RN:  Kareen Monk  :     Level of Care (GIP/Routine/Respite): GIP  Facility of Care: ED [facility]  Patient Room: [address] [place] [address]   ER Visits/ Hospitalizations in past year: 2 trips to the ED, 1 current admission on 2/15/18  Hospice Diagnosis: copd  COPD (chronic obstructive pulmonary disease) ([facility] [address]  Onset Date of Hospice Diagnosis: 3/1/18  Summary of Disease Progression Leading to Hospice Diagnosis: Multiple ED/hosp admissions for COPD, family are honoring patients wishes imminent death, admitted with acute copd, requiring intubation, pt extubated, requiring 6L02    Co-Morbidities:   Patient Active Problem List   Diagnosis Code    Anxiety and depression F41.8    Postsurgical percutaneous transluminal coronary angioplasty status Z98.61    Tobacco use disorder F17.200    Old myocardial infarction I25.2    Essential hypertension, benign I10    Coronary atherosclerosis of native coronary artery I25.10    Mixed hyperlipidemia E78.2    Acute myocardial infarction of other inferior wall, subsequent episode of care I21.19    Multiple bruises T07. 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 o. who was admitted to [address] [place] [address]. The patient's principle diagnosis of COPD has resulted in respiratory distress. Functionally, the patient's Palliative Performance Scale has declined over a period of days and is estimated at 10%. Objective information that support this patients limited prognosis includes: Resp distress requiring 6L 02 via NC. The patient/family chose comfort measures with the support of Hospice. Patient meets for GIP LOC as evidenced by Respiratory distress and agitation, requiring scheduled robinul, ativan and morphine    Prognosis estimated based on 03/01/18 clinical assessment is:   [x] Few to Many Hours  [] Hours to Days   [] Few to Many Days   [] Days to Weeks   [] Few to Many Weeks   [] Weeks to Months   [] Few to Many Months    ASSESSMENT    Patient self-reports:  []  Yes    [x] No    SYMPTOMS: SOB, requring O2  SIGNS/PHYSICAL FINdINGS: Pt appeared resting in bed, utilizing 6 L 02   KARNOFSKY: 10%      Learning Assessment:  Patient  Is patient willing/able to learn? What is the highest level of education completed? Learning preference (written material, demonstration, visual)? Learning barriers (ESOL, [place], poor vision)? Caregiver  Is caregiver willing to learn care for patient? What is the highest level of education completed? Learning preference (written material, demonstration, visual)? Learning barriers (ESOL, [place], poor vision)? CLINICAL INFORMATION     Wt Readings from Last 3 Encounters:   02/21/18 44.4 kg (97 lb 12.8 oz)   08/02/17 47.6 kg (105 lb)   05/10/17 47.4 kg (104 lb 8 oz)     Ht Readings from Last 3 Encounters:   02/15/18 5' 2\" (1.575 m)   08/02/17 5' (1.524 m)   05/10/17 5' (1.524 m)     There is no height or weight on file to calculate BMI.     Visit Vitals    BP (!) 73/44    Pulse (!) 52    Temp 97.4 °F (36.3 °C)    Resp 16    SpO2 (!) 87%       LAB VALUES  No results found for this visit on 18 (from the past 12 hour(s)). No results found for this visit on 18 (from the past 6 hour(s)). Lab Results   Component Value Date/Time    Protein, total 5.0 (L) 2018 02:42 AM    Albumin 1.2 (L) 2018 02:42 AM       Currently this patient has:  [x] Supplemental O2 [x] Peripheral IV  [] PICC    [] PORT   [] Maldonado Catheter [] NG Tube   [] PEG Tube [] Ostomy    [] AICD: Has ICD been deactivated? [] Yes [] No:______    PLAN     1. GIP patient: will cont monitor for resp distress and agitation  2. Comfort care with scheduled morphine and lorazepam    Hospice Team Frequency Orders:  Skilled Nurse -  1 Daily x 14 days  with 5 PRN visits for symptom control. MSW  1 visit for initial assessment/evaluation for family support and need for volunteer services. Antony Haney  1 visit for initial assessment/evaluation for spiritual support. ADVANCE CARE PLANNING (Complete in ACP Flow Sheet)   Code Status: DNR    Durable DNR: [x]  Yes  []  No  Code Status Discussed/Confirmed:yes  Preference for Other Life Sustaining Treatment Discussed/Confirmed:  Hospitalization Preference:  (ex. Patient would like to receive end of life care in the hospital, in a facility, at home etc.)  Advance Care Planning 2018   Patient's Healthcare Decision Maker is: Legal Next of Kin   Primary Decision Maker Name Joseph Kincaid   Primary Decision Maker Phone Number [phone]   Primary Decision Maker Relationship to Patient Adult child   Confirm Advance Directive None   Patient Would Like to Complete Advance Directive No   Does the patient have other document types Do Not Resuscitate        Service: [] Yes  [x]  No      [] Unknown  Appropriate for Pinning Ceremony:  [] Yes     [x] No  Yazidi: Congregational   Home: Family working on [address] [facility] body to [address] [facility]     1. Discharge Plan: GIP  2.  Patient/Family teaching: Fanta Jewell from sight pamphlet given to 2 of the children  3. Response to patient/family teaching: Thankful    SOCIAL/EMOTIONAL/SPIRITUAL NEEDS       Psych/ Social/ Emotional Issues Identified: 4 living children, all have a strained relationship from mother-Jm monreal and Clay Pete came to hospital to help with decision making  Nikki Jimenez (friend) very involved with patient but patients children unsure of relationship. They stated they are ok with her visiting but not to inform Nikki Jimenez of any medical decisions or history. Caregiver Support:  [x] Provided information on End of Life Care   [x] Material Provided: Gone From My Sight or Rashmi Islas contacted, discharge to hospice order received  Dr. Elias Lerma contacted, agrees to serve as attending provider for hospice and provided verbal certification of terminal illness with life expectancy of 6 months or less. Orders for hospice admission, medications and plan of treatment received. Medication reconciliation completed. MEDS: See medication list below  DME: Per hospital  Supplies: Per hospital  IDT communication to include MD, SN, SW, CH and support team    ALLERGIES AND MEDICATIONS     Allergies:    Allergies   Allergen Reactions    Iodinated Contrast- Oral And Iv Dye Anaphylaxis    Penicillin G Hives    Ciprofloxacin Unable to Obtain    Lyrica [Pregabalin] Other (comments)     lossing balance     Current Facility-Administered Medications   Medication Dose Route Frequency    LORazepam (ATIVAN) injection 1 mg  1 mg IntraVENous Q15MIN PRN    acetaminophen (TYLENOL) suppository 650 mg  650 mg Rectal Q4H PRN    glycopyrrolate (ROBINUL) injection 0.2 mg  0.2 mg IntraVENous Q4H    scopolamine (TRANSDERM-SCOP) 1 mg over 3 days 1 Patch  1 Patch TransDERmal Q72H    bisacodyl (DULCOLAX) suppository 10 mg  10 mg Rectal DAILY PRN    morphine 2 mg  2 mg IntraVENous Q15MIN PRN    morphine 2 mg  2 mg IntraVENous Q4H    LORazepam (ATIVAN) injection 1 mg  1 mg IntraVENous Q4H    saline peripheral flush soln 5 mL  5 mL InterCATHeter PRN

## 2018-03-01 NOTE — PROGRESS NOTES
ADULT PROTOCOL: JET AEROSOL  REASSESSMENT    Patient  Disha Kirkland     74 y.o.   female     3/1/2018  1:05 AM    Breath Sounds Pre Procedure: Right Breath Sounds: Coarse                               Left Breath Sounds: Coarse    Breath Sounds Post Procedure: Right Breath Sounds: Coarse                                 Left Breath Sounds: Coarse    Breathing pattern: Pre procedure Breathing Pattern: Regular          Post procedure Breathing Pattern: Dyspnea at rest    Heart Rate: Pre procedure Pulse: 64           Post procedure Pulse: 82    Resp Rate: Pre procedure Respirations: 20           Post procedure Respirations: 24      Cough: Pre procedure Cough: Congested               Post procedure Cough: Congested    Oxygen: O2 Device: Nasal cannula   Flow rate (L/min) 6     Changed: NO    SpO2: Pre procedure SpO2: 90 %   with oxygen              Post procedure SpO2: 90 %  with oxygen    Nebulizer Therapy: Current medications Aerosolized Medications: DuoNeb      Changed: NO    Smoking History:    Smoking status: Current Every Day Smoker         Packs/day: 0.50         Years: 0.00         Types: Cigarettes         Problem List:   Patient Active Problem List   Diagnosis Code    Anxiety and depression F41.8    Postsurgical percutaneous transluminal coronary angioplasty status Z98.61    Tobacco use disorder F17.200    Old myocardial infarction I25.2    Essential hypertension, benign I10    Coronary atherosclerosis of native coronary artery I25.10    Mixed hyperlipidemia E78.2    Acute myocardial infarction of other inferior wall, subsequent episode of care I21.19    Multiple bruises T07. 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

## 2018-03-01 NOTE — PROGRESS NOTES
Oncology Nursing Communication Tool  7:43 PM  2/28/2018     Bedside shift change report given to Yosi Sandy RN (incoming nurse) by Ashtyn Horne RN (outgoing nurse) on [employer] Furnace. Report included the following information SBAR. Shift Summary:       Issues for physician to address: Oncology Shift Note   Admission Date 2/15/2018   Admission Diagnosis Acute exacerbation of chronic obstructive pulmonary disease (COPD) ([facility] [address]   Code Status DNR   Consults IP CONSULT TO HOSPITALIST  IP CONSULT TO CARDIOLOGY  IP CONSULT TO NEUROLOGY  IP CONSULT TO INTERVENTIONAL RADIOLOGY  IP CONSULT TO GASTROENTEROLOGY  IP CONSULT TO PALLIATIVE CARE - PROVIDER  IP CONSULT TO PULMONOLOGY      Cardiac Monitoring [] Yes [x] No      Purposeful Hourly Rounding [x] Yes    Sreedhar Score Total Score: 5   Sreedhar score 3 or > [] Bed Alarm [] Avasys [] 1:1 sitter [] Patient refused (Place signed refusal form in chart)      Pain Managed [] Yes [] No    Key Pain Meds             oxyCODONE IR (ROXICODONE) 10 mg tab immediate release tablet  (Taking) Take 10 mg by mouth every four (4) hours as needed for Pain. ASPIRIN/ACETAMINOPHEN/CAFFEINE (EXCEDRIN EXTRA STRENGTH PO)  (Taking) Take 1 Tab by mouth daily as needed for Pain. Influenza Vaccine Received Flu Vaccine for Current Season (usually Sept-March): Yes           Oxygen needs?  [] Room air Oxygen @  []1L    []2L    []3L   []4L    []5L   [x]6L     Use home O2? [] Yes [] No  Perform O2 challenge test using  smartphrase (.oxygenchallenge)      Last bowel movement Last Bowel Movement Date: 02/27/18  bowel movement      Urinary Catheter       External Female Catheter 02/27/18-Urine Output (mL): 300 ml     LDAs               Peripheral IV 08/02/17 Right Arm (Active)       Peripheral IV 02/28/18 Right Antecubital (Active)   Site Assessment Clean, dry, & intact 2/28/2018 12:54 PM   Phlebitis Assessment 0 2/28/2018 12:54 PM   Infiltration Assessment 0 2/28/2018 12:54 PM Dressing Status Clean, dry, & intact 2/28/2018 12:54 PM   Dressing Type Transparent;Tape 2/28/2018 12:54 PM   Hub Color/Line Status Patent; Flushed;Blue 2/28/2018 12:54 PM          External Female Catheter 02/27/18 (Active)   Site Assessment Clean, dry, & intact 2/28/2018  8:00 AM   Repositioned Yes 2/28/2018  8:00 AM   Perineal Care Yes 2/28/2018  8:00 AM   Wick Changed No 2/28/2018  8:00 AM   Suction Canister/Tubing Changed No 2/28/2018  8:00 AM   Urine Output (mL) 300 ml 2/28/2018 11:30 AM                   Readmission Risk Assessment Tool Score High Risk            23       Total Score        3 Has Seen PCP in Last 6 Months (Yes=3, No=0)    3 Patient Length of Stay (>5 days = 3)    9 Pt. Coverage (Medicare=5 , Medicaid, or Self-Pay=4)    8 Charlson Comorbidity Score (Age + Comorbid Conditions)        Criteria that do not apply:    . Living with Significant Other. Assisted Living. LTAC. SNF.  or   Rehab    IP Visits Last 12 Months (1-3=4, 4=9, >4=11)       Expected Length of Stay 3d 19h   Actual Length of Stay [address]

## 2018-03-01 NOTE — PROGRESS NOTES
Oncology Nursing Communication Tool  6:44 AM  3/1/2018     Bedside shift change report given to Juanito Garduno RN (incoming nurse) by Alejandrina Ha (outgoing nurse) on Elieser Shawn. Report included the following information SBAR, Kardex, Intake/Output, MAR and Recent Results. Shift Summary: no urine output all shift. Gave ativan morphine and robinul multiple times. Unresponsive. Issues for physician to address: inpt hospice? Oncology Shift Note   Admission Date 2/15/2018   Admission Diagnosis Acute exacerbation of chronic obstructive pulmonary disease (COPD) ([facility] [address]   Code Status DNR   Consults IP CONSULT TO HOSPITALIST  IP CONSULT TO CARDIOLOGY  IP CONSULT TO NEUROLOGY  IP CONSULT TO INTERVENTIONAL RADIOLOGY  IP CONSULT TO GASTROENTEROLOGY  IP CONSULT TO PALLIATIVE CARE - PROVIDER  IP CONSULT TO PULMONOLOGY      Cardiac Monitoring [] Yes [x] No      Purposeful Hourly Rounding [x] Yes    Sreedhar Score Total Score: 3   Sreedhar score 3 or > [] Bed Alarm [] Avasys [] 1:1 sitter [] Patient refused (Place signed refusal form in chart)      Pain Managed [x] Yes [] No    Key Pain Meds             oxyCODONE IR (ROXICODONE) 10 mg tab immediate release tablet  (Taking) Take 10 mg by mouth every four (4) hours as needed for Pain. ASPIRIN/ACETAMINOPHEN/CAFFEINE (EXCEDRIN EXTRA STRENGTH PO)  (Taking) Take 1 Tab by mouth daily as needed for Pain. Influenza Vaccine Received Flu Vaccine for Current Season (usually Sept-March): Yes           Oxygen needs?  [] Room air Oxygen @  []1L    []2L    []3L   []4L    []5L   [x]6L     Use home O2? [] Yes [] No  Perform O2 challenge test using  smartphrase (.oxygenchallenge)      Last bowel movement Last Bowel Movement Date: 02/27/18  bowel movement      Urinary Catheter       External Female Catheter 02/27/18-Urine Output (mL): 300 ml     LDAs               Peripheral IV 08/02/17 Right Arm (Active)       Peripheral IV 02/28/18 Right Antecubital (Active) Site Assessment Clean, dry, & intact 3/1/2018  2:39 AM   Phlebitis Assessment 0 3/1/2018  2:39 AM   Infiltration Assessment 0 3/1/2018  2:39 AM   Dressing Status Clean, dry, & intact 3/1/2018  2:39 AM   Dressing Type Tape;Transparent 3/1/2018  2:39 AM   Hub Color/Line Status Blue;Capped 3/1/2018  2:39 AM   Alcohol Cap Used Yes 2/28/2018  7:58 PM          External Female Catheter 02/27/18 (Active)   Site Assessment Clean, dry, & intact 2/28/2018  8:00 AM   Repositioned Yes 2/28/2018  8:00 AM   Perineal Care Yes 2/28/2018  8:00 AM   Wick Changed No 2/28/2018  8:00 AM   Suction Canister/Tubing Changed No 2/28/2018  8:00 AM   Urine Output (mL) 300 ml 2/28/2018 11:30 AM                   Readmission Risk Assessment Tool Score High Risk            23       Total Score        3 Has Seen PCP in Last 6 Months (Yes=3, No=0)    3 Patient Length of Stay (>5 days = 3)    9 Pt. Coverage (Medicare=5 , Medicaid, or Self-Pay=4)    8 Charlson Comorbidity Score (Age + Comorbid Conditions)        Criteria that do not apply:    . Living with Significant Other. Assisted Living. LTAC. SNF.  or   Rehab    IP Visits Last 12 Months (1-3=4, 4=9, >4=11)       Expected Length of Stay 3d 19h   Actual Length of Stay [address]

## 2018-03-01 NOTE — PROGRESS NOTES
Oncology Nursing Communication Tool  6:52 PM  3/1/2018     Bedside shift change report given to May Lake RN (incoming nurse) by Abel Schultz (outgoing nurse) on [facility]. Report included the following information SBAR, Kardex, Intake/Output, MAR and Recent Results. Shift Summary:       Issues for physician to address: Oncology Shift Note   Admission Date 3/1/2018   Admission Diagnosis copd  COPD (chronic obstructive pulmonary disease) ([facility] [address]   Code Status Prior   Consults None      Cardiac Monitoring [] Yes [] No      Purposeful Hourly Rounding [] Yes    Sreedhar Score Total Score: 3   Sreedhar score 3 or > [] Bed Alarm [] Avasys [] 1:1 sitter [] Patient refused (Place signed refusal form in chart)      Pain Managed [] Yes [] No    Key Pain Meds     The patient is on no pain meds. Influenza Vaccine Received Flu Vaccine for Current Season (usually Sept-March): Yes           Oxygen needs? [] Room air Oxygen @  []1L    []2L    []3L   []4L    []5L   []6L     Use home O2? [] Yes [] No  Perform O2 challenge test using  smartphrase (.oxygenchallenge)      Last bowel movement Last Bowel Movement Date: 02/27/18  bowel movement      Urinary Catheter             LDAs               Peripheral IV 02/28/18 Right Antecubital (Active)   Site Assessment Clean, dry, & intact 3/1/2018  3:00 PM   Phlebitis Assessment 0 3/1/2018  3:00 PM   Infiltration Assessment 0 3/1/2018  3:00 PM   Dressing Status Clean, dry, & intact 3/1/2018  3:00 PM   Dressing Type Tape;Transparent 3/1/2018  3:00 PM   Hub Color/Line Status Blue;Patent 3/1/2018  3:00 PM   Alcohol Cap Used Yes 2/28/2018  7:58 PM                         Readmission Risk Assessment Tool Score Low Risk            11       Total Score        3 Has Seen PCP in Last 6 Months (Yes=3, No=0)    8 Charlson Comorbidity Score (Age + Comorbid Conditions)        Criteria that do not apply:    . Living with Significant Other. Assisted Living. LTAC. SNF. or   Rehab    Patient Length of Stay (>5 days = 3)    IP Visits Last 12 Months (1-3=4, 4=9, >4=11)    Pt.  Coverage (Medicare=5 , Medicaid, or Self-Pay=4)       Expected Length of Stay - - -   Actual Length of Stay 0          Rupa Friedman

## 2018-03-01 NOTE — HOSPICE
[facility] RN note:  Pt appears dyspneic requiring 3 PRN doses of morphine and 5 PRN doses of 1mg IV ativan. Upper airway secretions pronounced despite 4 PRN doses of IV robinul. Approval for inpt hospice at [facility] level of care per Dr Bayron Calzada. Met with daughter Luiz Jain and son Tone Carvalho who are 2 of 4 living children. Discussed hospice philosophy and services. Reportedly all are in agreement with admission to hospice after converting to comfort measures yesterday with palliative care. Consent forms signed by son with this RN. Thank you for the opportunity to care for this pt and family. Please contact hospice at [phone] with any questions or concerns.

## 2018-03-01 NOTE — HOSPICE
Initial spiritual visit completed. Patient did not respond to the . Family had requested the  offer a blessing for the patient. No family present during visit. The  talked to the patient about end-of-life and offered a blessing. Called the patient's daughter and offered support. Daughter shared strained relationships and her struggles with how present to be. The  validated concerns and spoke to the daughter about what was needed for her to be at peace with her mother's death. Family appreciative of 's presence with the patient. Will call if additional support is needed.

## 2018-03-02 NOTE — PROGRESS NOTES
Primary RN entered room at 1640 to check patient. No signs of life were assessed, no chest rise and fall, no apical pulse or breath sounds auscultated, no carotid or radial pulse palpated. Second verification was completed by Julia Belcher RN, no signs of life assessed.  paged by Mariana. nursing supervisor, hospice, pronouncing MD called by primary RN. Hospice RN to inform Ann Tee MD and patient family. Cell phone  and wedding band sent to [employer]. Post mortem care completed by Primary RN and PCA. Personal belongings including dentures, clothes, and shoes packed in personal belonging bag and put with patient to [place] [facility]

## 2018-03-02 NOTE — PROGRESS NOTES
Problem: Falls - Risk of  Goal: *Absence of Falls  Document Sreedhar Fall Risk and appropriate interventions in the flowsheet.    Outcome: Progressing Towards Goal  Fall Risk Interventions:  Mobility Interventions: Communicate number of staff needed for ambulation/transfer    Mentation Interventions: Door open when patient unattended    Medication Interventions: Evaluate medications/consider consulting pharmacy    Elimination Interventions: Patient to call for help with toileting needs

## 2018-03-02 NOTE — ROUTINE PROCESS
Bedside and Verbal shift change report given to Caroline Mojica RN (oncoming nurse) by Yaron nolasco. Report given with SBAR, Kardex, MAR and Recent Results. 0630  Patient had a restful night without signs of distress noted. Patient turned every 2-3 hours. Patient did not void this shift. Patient continues to rest with a peaceful expression on her face. 0745 Bedside and Verbal shift change report given to oncoming nurse by Caroline Mojica RN (offgoing nurse). Report given with SBAR, Kardex, MAR and Recent Results.

## 2018-03-02 NOTE — HOSPICE
Jimmy Javier RN note    Son Malcom Jimenez returned my call and I advised him of his   mother's passing. Support given. Lenis Frankel reproted that everyone has been so kind to them and their mother and that they really appreciate it  Gerald Gaines it helped them get through this. Lenis Frankel and patient's daughter will not be coming up to [facility] this melissa and asked that the ring be taken off patient and kept for them. Said they would pick it up tomorrow. Gerald Gaines their mother  wanted it given to her grandson. Lenis Frankel said they had called several  homes to get information about cremation and I advised him of the info for [employer] and [employer] and [employer]     Please call [phone] if questions or concerns    Mike Massey RN [facility]      TC to unit nurse Denia Duran to advise.

## 2018-03-02 NOTE — HOSPICE
[facility]  [phone]    Social Work Admission Note  Patient Name: Gianni Munson  YOB: 1945  Age: 74 y.o. Date of Visit: 18  Facility of Care: [facility]  Patient Room: 1136/01     Hospice Attending: Allen Zayas MD  Hospice Diagnosis: copd  COPD (chronic obstructive pulmonary disease) ([address]    Level of Care:    [x]  GIP    []  Respite[]  Routine    NARRATIVE     Patient is a 74year old female who was admitted to hospice services on 3-1-18 with dx of ES COPD. Patient initially admitted to [facility] on 2-15-18 with exacerbation of COPD then became septic. Despite treatment patient continued to decline - family chose comfort measures and hospice care. Upon initial visit to patient's room she was in bed in an unresponsive state. No family present. She appeared well-palliated. Spoke with son Angelica Lerma to obtain person hx information. He reported a tragic family life - patient had 5 children - her young son [place] turned a pot of boiling water over on himself and was badly injured. She reportedly felt very guilty and left her other children and took [place] with her. They lived in various places on the [place] [address].  According to son Navin Marks they reconnected when she and Arsh clinton were living on the streets in [place]. Bret  and Navin Marks went to identify the body and arranged for cremation. He made arrangements for mother to return to [place] where she was set up with an apartment and all necessities. After a few years she moved out and made some poor choices regarding living arrangements. Navin Marks relayed belief she had been  4 - 5 times. Her last residence was in the home of a woman in [facility]. She became ill and finally went to [facility] where she is presently. Navin Marks stated the other children don't even know their mother as they grew up without her. Msw attempted to contact daughter Austen Loyd regarding family's wish to donate body through [facility]. According to Mercedes Alvarez she is working on application. This was reportedly patient's wish. ADVANCE CARE PLANNING    Code Status: DVR  Durable DNR: _ Yes  _ No  Advance Care Planning 2018   Patient's Healthcare Decision Maker is: Legal Next of Dale Jonas   Primary Decision Maker Name Delene Sizer   Primary Decision Maker Phone Number [phone]   Primary Decision Maker Relationship to Patient Adult child   Confirm Advance Directive None   Patient Would Like to Complete Advance Directive No   Does the patient have other document types Do Not Resuscitate       Relationship Status:  []  Single     []        []      []  Domestic Partner     []  /  []  Common Law  []    [x]  Unknown    If in a relationship, name of partner/spouse:  Duration of relationship:    Samaritan: Buddhist     Home: Body donation through [employer] Provided: [facility]     Social Work Initial Assessment     Gender:  female    Race/Ethnicity: (kaykay all that apply)  []  [place] [facility] or [place] Native  []  [address]  []  Black or [place] American  []   or   []  [facility] or [facility]  [x]  Juni Ponds  []  Unknown      Service:    []  Yes   []  No       [x]  Unknown  Appropriate for Pinning Ceremony:   []  Yes      []  No  Is patient using VA benefits?    []  Yes      []  No     Primary Language: English  []   Needed  []   utilized during visit    Ability to express thoughts/needs/feelings  []  Expressed thoughts/feelings/needs without difficulty  []  Requires extra time and cuing  []  Speech limited single words  []  Uses only gestures (eye, blinking eye or head movement/pointing)  []  Unable to express thoughts/feelings/needs (speech unintelligible or inappropriate)  [x]  Unresponsive  Notes:      Mental Status:  []  Alert-oriented to:     []  Person     []  Place     []  Time  []  Comatose-responds to:    []   Verbal stimuli    []  Tactile stimuli    []  Painful stimuli  []  Forgetful  []  Disoriented/Confused  []  Lethargic  []  Agitated  []  Other (specify):    Notes:      Patients description of Illness/Current Health Status:    [x]  Patient unable to discuss  []  Patient unwilling to discuss  []  (Specify)        Knowledge/Understanding of Disease Process  Patient:    []  Demonstrates knowledge/understanding of disease process   []  Demonstrates knowledge/understanding of treatment plan   []  Demonstrates knowledge/understanding of prognosis   []  Demonstrates acceptance of prognosis   []  Demonstrates knowledge/understanding of resuscitation status   []  Other (specify)  Caregiver:   []  Demonstrates knowledge/understanding of disease process   []  Demonstrates knowledge/understanding of treatment plan   []  Demonstrates knowledge/understanding of prognosis   []  Demonstrates acceptance of prognosis   []  Demonstrates knowledge/understanding of resuscitation status   []  Other (specify)  Notes:      Patients living arrangement/care setting:  Use the PRIOR COLUMN when the PATIENTS current health status necessitated a change in his/her primary residence.     Prior Current Response              []             []    Patients own home/residence              [x]             []    Home of family member/friend              []             []    Boarding home              []             []    Assisted living facility/[facility] center              []             []    Hospital/Acute care facility              []             []    Skilled nursing facility              []             []    Long term care facility/Nursing home              []             [x]    Hospice in Patient      Primary Caregiver:  []  No Primary Caregiver  Name of Primary Caregiver: Cathy Wen  Relationship or Primary Caregiver:    []  Spouse/Significant other       [x]  Natural Child        []  Step child       []  Sibling   []  Parent   []  Friend/Neighbor   []  Community/Alevism Volunteer   []  Paid help   []  Other (specify):___________  Notes:       Family members/Significant others:  Name: Raissa  Relationship:daughter  Phone Number:[phone]  Actively involved in care? [x]  Yes  []  No    Name:  Relationship:  Phone Number:  Actively involved in care? []  Yes  []  No    Name:  Relationship:  Phone Number:  Actively involved in care?   []  Yes  []  No    Social support systems: (select ONE best description)  []  Excellent social support system which includes three or more family members or friends  [x]  Good social support system which includes two or less members or friends  []  [address] support which includes one family member or friend  []  Poor social support; no family members or friends; basically ALONE  Notes:      Emotional Status: (kaykay all that apply)    Patient Caregiver Response                 [x]                [x]    Mood/Affect stable and appropriate                   []                []    Angry                 []                []    Anxious                 []                []    Apprehensive                 []                []    Avoidant                 []                []    Clinging                 []                []    Depressed                 []                []    Distraught                 []                []    Elated                 []                []    Euphoric                 []                []    Fearful                 []                []    Flat Affect                 []                []    Helpless                 []                []    Hostile                 []                []    Impulsive                 []                []    Irritable                 []                []    Labile                 []                []    Manic                 []                []    Restlessness                 []                []    Sad                 []                []    Suspicious                 []                []    Tearful []                []    Withdrawn     Notes:     Coping Skills (strengths/weakness):    Patient: Coping Skills (strength/weakness):    Family/caregiver (strength/weakness):     [place] of care (kaykay all that apply):     [x]  No burden evident   []  Family must administer medications   []  Illness causing financial strain   []  Family/Support feels overwhelmed   []  Family/Support sleep disturbed with patients care   []  Patients care causes extra physical stress  of death  []  Illness causes changes in family lifestyle  []  Illness impacting family/support employment  []  Family experiencing increased time demands  []  Patients behavior endangers family  []  Denial of patients illness  []  Concern over outcome of illness/fear  []  Patients behavior embarrassing to family   Notes:      Risk Factors: (kaykay all that apply):    [x]  No burden evident   []  Alcohol abuse  []  Financial resources inadequate to meet basic needs (food/house/etc)  []  Financial resources inadequate to meet health care needs (supplies/equipment/medications)  []  Food/nutrition resources inadequate  []  Home environment unsafe/inadequate for home care  []  Homicidal risk  []  Lives alone or without concerned relatives  []  Multiple medications/complex schedule  []  Physical limitations increase likelihood of falls  []  Plan of care/treatments complicated  []  Substance use/abuse  []  Suicidal risk  []  Visual impairment threatens safety/ability to perform self-care  []  Other (specify):     Abuse/Neglect (actual/potential risks):  [x]  No signs of abuse/neglect  []  History of abuse/neglect                 []  [id number] []  Sexual  []  History of domestic violence  []  Lacks adequate physical care  []  Lacks emotional nurturing/support  []  Lacks appropriate stimulation/cognitive experiences  []  Left alone inappropriately  []  Lacks necessary supervision  []  Inadequate or delayed medical care  []  Unsafe environment (i.e guns/drug use/history of violence in the home/etc.)  []  Bruising or other physical signs of injury present  []  Other (specify):  Notes:   []  Refer to child/adult protective services      Current Sources of Stress (in Addition to Current Illness):   []  None reported  []  Bills/Debt    []  Career/Job change    []   (short term)    []   (long term)    []  Death of a child (recent)    []  Death of a parent (recent)   []  Death of a spouse (recent)   []  Employment status changed   []  Family discord    []  Financial loss/Inadequate inther (specify):come  []  Job loss  []  Legal issues unresolved  []  Lifestyle change  []  Marital discord  []  Marriage within the last year  []  Paperwork (insurance/legal/etc) overwhelming  []  Separation/Divorce  [x]  Other (specify):  Notes: family reportedly estranged for years but now children involved     Current Community Resources Being Utilized     1. Body donation through [facility]        Interventions/Plan of Care     1. Assess social and emotional factors related to coping with end of life issuesxxx  2. Community resource planning/referral xxx  3. Relocation to different care setting if/when symptoms stabilizexxx    Discharge Planning     1.  Patient will most likely pass in hospital    MSW Assessment Completed by: Leigh Ann Waldrop  03/02/18    Time In:1pm       Time Out:215pm

## 2018-03-02 NOTE — HOSPICE
[facility]  [phone]    GIP Daily Nursing Note   Patient Name: Romina Benito  YOB: 1945  Age: 74 y.o. Date of Visit: 03/02/18  Facility of Care: [facility]  Patient Room: [facility]/     Hospice Attending: David Jose MD  Hospice Diagnosis: copd  COPD (chronic obstructive pulmonary disease) ([facility]    Level of Care: GIP    Current GIP Symptoms    1. Secretions/respiratory distress assessed to be better palliated today with ronchi throughout lung fields  Glycopyrrolate 0.2 mg IV q 4 hours   Scopolamine patch as well as Morphine 2 mg IV q 4 hours   Unit nurse giving Morphine 2 mg IV now for assessed accessory muscles used with breaths     2. No seizure activity and no anxiety observed with Lorazepam 1 mg IV q 4 hours        ASSESSMENT & PLAN   Must update Plan of Care including visit frequencies for IDT members  SNV DAILY  1. Secretions/respiratory distress assessed to be better palliated today with ronchi throughout lung fields  Glycopyrrolate 0.2 mg IV q 4 hours   Scopolamine patch as well as Morphine 2 mg IV q 4 hours   Unit nurse giving Morphine 2 mg IV now for assessed accessory muscles used with breaths     2. No seizure activity and no anxiety observed with Lorazepam 1 mg IV q 4 h    Spiritual Interventions:     Psych/ Social/ Emotional Interventions:     Care Coordination Needs: discussed patient with unir nurse Georges Johnson   Will send report to [facility] hospice interdisciplinary team.      Care plan and New Orders discussed / approved with David Jose MD.    Description History and Chart Review   If this is initial GIP note must document RN assessment/MD communication in previous setting.  Specifically document nursing/medication needs in last 24 hours to support GIP care  Narrative History of last 24 hours that demonstrates care cannot be provided in another setting  Patient just admitted yesterday with marked respiratory distress   Assessing daily to see if medication regimen needs to be adjusted and to support patient family and staff   Patient with peripheral IV site for medications and is unresponsive so SL meds would be difficult at best.    What has been done to control the patient's symptoms in the last 24 hours? Does the patient currently require IV medications? yes  Does the patient currently require scheduled medications? Yes  Does the patient currently require a PCA? no    List number of doses of PRN medications in last 24 hours:  Medication 1: Morphine 2 mg IV   Number of doses: 1    Medication 2:   Number of doses:    Medication 3:   Number of doses:    Supporting documentation for GIP need for pain control:  [x] Frequent evaluation by a doctor, nurse practitioner, nurse   [x] Frequent medication adjustment    [x] IVs that cannot be administered at home   [x] Aggressive pain management   [x] Complicated technical delivery of medications              Supporting documentation for GIP need for symptom control:  [x]  Sudden decline necessitating intensive nursing intervention  []  Uncontrolled / intractable nausea or vomiting   []  Pathological fractures  []  Advanced open wounds requiring frequent skilled care  [x] Unmanageable respiratory distress  [] New or worsening delirium   [] Delirium with behavior issues: Is 24 hour caregiver present due to safety concerns with agitation? (yes/no)  [x] Imminent death  with skilled nursing needs documented above    DISCHARGE PLANNING   Daily discharge planning required for GIP  1. Discharge Plan: It is expected that patient will pass while inpatient yet [facility] is an option  2. Patient/Family teaching: not present  3. Response to patient/family teaching:     ASSESSMENT    KARNOFSKY: 10    Quality Measure: Patient self-reports:  [] Yes    [x] No    ESAS: 0Time of Assessment: Pain (14151-10): 2  Fatigue (1-10): 0  Shortness of breath (1-10):4  Nausea (1-10): Appetite (1-10):    Anxiety: (1-10):   Depression: (1-10): Well-being: (1-10):   Constipation: _ Yes  _ No  LAST BM:     CLINICAL INFORMATION   Patient Vitals for the past 12 hrs:   Temp Pulse Resp BP SpO2   03/02/18 0807 98.1 °F (36.7 °C) (!) 102 14 (!) 68/40 (!) 76 %       Currently this patient has:  [x] Supplemental O2   [x] IV    [] PICC      [] PORT   [] NG Tube    [] PEG Tube   [] Ostomy     [] Maldonado draining _______ urine  [] Other:     SIGNS/PHYSICAL FINDINGS     Skin (including wound):  [] Warm, dry, supple, intact and color normal for race  [x] Warm   [x] Dry   [] Cool     [] Clammy       [] Diaphoretic    Turgor   [] Normal   [x] Decreased  Color:   [] Pink   [x] Pale   [] Cyanotic   [] Erythema   [] Jaundice   [] Normal for Race  []  Wounds:    Neuro:  [] Lethargy  [] Restlessness / agitation  [] Confusion / delirium  [] Hallucinations  [] Responds to maximal stimulation  [x] Unresponsive  [] Seizures     Visit Vitals    BP (!) 68/40    Pulse (!) 102    Temp 98.1 °F (36.7 °C)    Resp 14    SpO2 (!) 76%       Cardiac:  [] Dyspnea on Exertion  [] JVD  [] Murmur  [] Palpitations  [x] Hypotension  [] Hypertension  [x] Tachycardia  [] Bradycardia  [] Irregular HR  [] Pulses Decreased  [] Pulses Absent  [] Edema:       (Location, Grade and Pitting)  [] Mottling:      (Location)    Respiratory:  Breath sounds:    [] Wheeze   [x] Rhonchi   [] Rales   [] Even and unlabored  [x] Labored: se of accessory muscles           [] Cough   [] Non Productive   [] Productive    [] Description:           [] Deep suctioned   [x] O2 at _2__ LPM  [] High flow oxygen greater than 10 LPM  [] Bi-Pap    GI  [x] Abdomen sunken soft without bowel sounds  [] Ascites  [] Nausea  [] Vomiting  [] Incontinent of bowels  [] Bowel sounds (yes/no)  [] Diarrhea  [] Constipation (see above including last bowel movement)  [] Checked for impaction  [] Last BM     Nutrition  Diet:__________  Appetite:   [] Good   [] Fair   [] Poor   [] Tube Feeding       [] Voiding  [] Incontinent   [] Erica    Musculoskeletal  [] Balance/Peck Unsteady   [] Weak   Strength:    [] Normal    [] Limited    [] Decreasing   Activities:    [] Up as tolerated   [] Bedridden    [] Specify:    SAFETY  [] 24 hr. Caregiver   [] Side rails ? [] Hospital bed   [] Reviewed Falls & Safety       ALLERGIES AND MEDICATIONS     Allergies:    Allergies   Allergen Reactions    Iodinated Contrast- Oral And Iv Dye Anaphylaxis    Penicillin G Hives    Ciprofloxacin Unable to Obtain    Lyrica [Pregabalin] Other (comments)     lossing balance       Current Facility-Administered Medications   Medication Dose Route Frequency    LORazepam (ATIVAN) injection 1 mg  1 mg IntraVENous Q15MIN PRN    acetaminophen (TYLENOL) suppository 650 mg  650 mg Rectal Q4H PRN    glycopyrrolate (ROBINUL) injection 0.2 mg  0.2 mg IntraVENous Q4H    scopolamine (TRANSDERM-SCOP) 1 mg over 3 days 1 Patch  1 Patch TransDERmal Q72H    bisacodyl (DULCOLAX) suppository 10 mg  10 mg Rectal DAILY PRN    morphine 2 mg  2 mg IntraVENous Q15MIN PRN    morphine 2 mg  2 mg IntraVENous Q4H    LORazepam (ATIVAN) injection 1 mg  1 mg IntraVENous Q4H    saline peripheral flush soln 5 mL  5 mL InterCATHeter PRN          Visit Time In: 1400  Visit Time Out: 1430

## 2018-03-02 NOTE — HOSPICE
Dee Dee Santillan RN note. Advised by unit nurse Richi Knox of patient's passing   Dr. Marylou Burks advised. TC to son Moo Mares and left message to please return my call. TC to Richi Knox and advised of the above. I will continue to reach son.     Please call [phone] if questions or concerns    Harini Ashraf, RN [facility]

## 2018-03-02 NOTE — PROGRESS NOTES
Responded to page when patient . No family present. Offered silent prayer at bedside.  may be paged if family arrives.   BREE Cotter, [facility], [facility] [address]    [address] Paging Service  [phone])

## 2018-03-02 NOTE — PROGRESS NOTES
Patient pronounced just before 5pm. Time of death 16:40. Pupils fixed and not reactive to light. Heart and breath sounds absent.

## 2018-03-03 NOTE — HOSPICE
[facility]  [phone]    Discharge/Death Nursing Note   Patient Name: Mirian Medrano  YOB: 1945  Age: 74 y.o. Date of Death: 18  Admitted Date: 3/1/2018  Time of Death: [address] of Care: [facility]  Level of Care: [facility]  Patient Room: [address] [place] [address]     Hospice Attending: Daniela Montelongo MD  Hospice Diagnosis: copd  COPD (chronic obstructive pulmonary disease) [address] [address]    Death Pronouncement   Pronouncement of death completed by: Dr. Malia Randall staff was not  present at the time of death    At the time of death the patient was documented as   Time of death 16:40. Pupils fixed and not reactive to light. Heart and breath sounds absent. The pt  within [address]    The following were notified of the patient's death: Son Loreta Saeed advised. He and patient's daughter were not going to [address]. Son expressed how very supportive hospice and the hospital staff had been to his mother and to them and that they really appreciated it.       Medications were disposed of per facility protocol     Discharge Summary   Discharge Reason: Death    Summary of Care Provided:    [x] Post mortem care provided by staff  [x] Notification of  home by staff    [x] Goals completed    Disciplines involved: [x] RN [x] SW [x]   [x] [facility]    [x] IDT communication/notification    Attending Physician, Dr. Daniela Montelongo  notified of death    Bereaved        Advance Care Planning 2018   Patient's Healthcare Decision Maker is: Legal Next of Dale [address]   Primary Decision Maker Name Loreta Saeed   Primary Decision Maker Phone Number [phone]   Primary Decision Maker Relationship to Patient Adult child   Confirm Advance Directive None   Patient Would Like to Complete Advance Directive No   Does the patient have other document types Do Not Resuscitate

## 2018-03-09 LAB
Lab: NORMAL
REFERENCE LAB,REFLB: NORMAL
TEST DESCRIPTION:,ATST: NORMAL

## 2018-03-14 LAB
BACTERIA SPEC CULT: ABNORMAL
BACTERIA SPEC CULT: ABNORMAL
SERVICE CMNT-IMP: ABNORMAL

## 2018-04-10 LAB
ACID FAST STN SPEC: NEGATIVE
MYCOBACTERIUM SPEC QL CULT: NEGATIVE
SPECIMEN PREPARATION: NORMAL
SPECIMEN SOURCE: NORMAL
